# Patient Record
Sex: MALE | Race: WHITE | NOT HISPANIC OR LATINO | Employment: OTHER | URBAN - METROPOLITAN AREA
[De-identification: names, ages, dates, MRNs, and addresses within clinical notes are randomized per-mention and may not be internally consistent; named-entity substitution may affect disease eponyms.]

---

## 2017-02-07 ENCOUNTER — HOSPITAL ENCOUNTER (OUTPATIENT)
Dept: PET IMAGING | Facility: HOSPITAL | Age: 62
Discharge: HOME | End: 2017-02-07

## 2017-02-07 ENCOUNTER — OFFICE VISIT (OUTPATIENT)
Dept: ONCOLOGY | Facility: CLINIC | Age: 62
End: 2017-02-07

## 2017-02-07 ENCOUNTER — HOSPITAL ENCOUNTER (OUTPATIENT)
Dept: PET IMAGING | Facility: HOSPITAL | Age: 62
Discharge: HOME | End: 2017-02-07
Admitting: NURSE PRACTITIONER

## 2017-02-07 ENCOUNTER — LAB (OUTPATIENT)
Dept: LAB | Facility: HOSPITAL | Age: 62
End: 2017-02-07

## 2017-02-07 VITALS
HEIGHT: 66 IN | DIASTOLIC BLOOD PRESSURE: 66 MMHG | WEIGHT: 175 LBS | BODY MASS INDEX: 28.12 KG/M2 | SYSTOLIC BLOOD PRESSURE: 146 MMHG | RESPIRATION RATE: 18 BRPM | TEMPERATURE: 98.3 F | HEART RATE: 73 BPM

## 2017-02-07 DIAGNOSIS — C34.32 MALIGNANT NEOPLASM OF LOWER LOBE OF LEFT LUNG (HCC): ICD-10-CM

## 2017-02-07 DIAGNOSIS — C34.32 MALIGNANT NEOPLASM OF LOWER LOBE OF LEFT LUNG (HCC): Primary | ICD-10-CM

## 2017-02-07 DIAGNOSIS — D86.9 SARCOIDOSIS: ICD-10-CM

## 2017-02-07 LAB
ALBUMIN SERPL-MCNC: 4.2 G/DL (ref 3.2–4.8)
ALBUMIN/GLOB SERPL: 1.4 G/DL (ref 1.5–2.5)
ALP SERPL-CCNC: 73 U/L (ref 25–100)
ALT SERPL W P-5'-P-CCNC: 17 U/L (ref 7–40)
ANION GAP SERPL CALCULATED.3IONS-SCNC: 3 MMOL/L (ref 3–11)
AST SERPL-CCNC: 23 U/L (ref 0–33)
BASOPHILS # BLD AUTO: 0.04 10*3/MM3 (ref 0–0.2)
BASOPHILS NFR BLD AUTO: 0.7 % (ref 0–1)
BILIRUB SERPL-MCNC: 0.4 MG/DL (ref 0.3–1.2)
BUN BLD-MCNC: 11 MG/DL (ref 9–23)
BUN/CREAT SERPL: 12.2 (ref 7–25)
CALCIUM SPEC-SCNC: 9.4 MG/DL (ref 8.7–10.4)
CHLORIDE SERPL-SCNC: 103 MMOL/L (ref 99–109)
CO2 SERPL-SCNC: 32 MMOL/L (ref 20–31)
CREAT BLD-MCNC: 0.9 MG/DL (ref 0.6–1.3)
DEPRECATED RDW RBC AUTO: 45.5 FL (ref 37–54)
EOSINOPHIL # BLD AUTO: 0.25 10*3/MM3 (ref 0.1–0.3)
EOSINOPHIL NFR BLD AUTO: 4.4 % (ref 0–3)
ERYTHROCYTE [DISTWIDTH] IN BLOOD BY AUTOMATED COUNT: 14 % (ref 11.3–14.5)
GFR SERPL CREATININE-BSD FRML MDRD: 86 ML/MIN/1.73
GLOBULIN UR ELPH-MCNC: 2.9 GM/DL
GLUCOSE BLD-MCNC: 96 MG/DL (ref 70–100)
GLUCOSE BLDC GLUCOMTR-MCNC: 91 MG/DL (ref 70–130)
HCT VFR BLD AUTO: 44.6 % (ref 38.9–50.9)
HGB BLD-MCNC: 15.5 G/DL (ref 13.1–17.5)
IMM GRANULOCYTES # BLD: 0.01 10*3/MM3 (ref 0–0.03)
IMM GRANULOCYTES NFR BLD: 0.2 % (ref 0–0.6)
LYMPHOCYTES # BLD AUTO: 0.87 10*3/MM3 (ref 0.6–4.8)
LYMPHOCYTES NFR BLD AUTO: 15.3 % (ref 24–44)
MCH RBC QN AUTO: 31 PG (ref 27–31)
MCHC RBC AUTO-ENTMCNC: 34.8 G/DL (ref 32–36)
MCV RBC AUTO: 89.2 FL (ref 80–99)
MONOCYTES # BLD AUTO: 0.78 10*3/MM3 (ref 0–1)
MONOCYTES NFR BLD AUTO: 13.7 % (ref 0–12)
NEUTROPHILS # BLD AUTO: 3.74 10*3/MM3 (ref 1.5–8.3)
NEUTROPHILS NFR BLD AUTO: 65.7 % (ref 41–71)
PLATELET # BLD AUTO: 186 10*3/MM3 (ref 150–450)
PMV BLD AUTO: 10.5 FL (ref 6–12)
POTASSIUM BLD-SCNC: 4.2 MMOL/L (ref 3.5–5.5)
PROT SERPL-MCNC: 7.1 G/DL (ref 5.7–8.2)
RBC # BLD AUTO: 5 10*6/MM3 (ref 4.2–5.76)
SODIUM BLD-SCNC: 138 MMOL/L (ref 132–146)
WBC NRBC COR # BLD: 5.69 10*3/MM3 (ref 3.5–10.8)

## 2017-02-07 PROCEDURE — A9552 F18 FDG: HCPCS | Performed by: NURSE PRACTITIONER

## 2017-02-07 PROCEDURE — 36415 COLL VENOUS BLD VENIPUNCTURE: CPT

## 2017-02-07 PROCEDURE — 85025 COMPLETE CBC W/AUTO DIFF WBC: CPT | Performed by: NURSE PRACTITIONER

## 2017-02-07 PROCEDURE — 80053 COMPREHEN METABOLIC PANEL: CPT | Performed by: NURSE PRACTITIONER

## 2017-02-07 PROCEDURE — 78815 PET IMAGE W/CT SKULL-THIGH: CPT

## 2017-02-07 PROCEDURE — 99214 OFFICE O/P EST MOD 30 MIN: CPT | Performed by: INTERNAL MEDICINE

## 2017-02-07 PROCEDURE — 82962 GLUCOSE BLOOD TEST: CPT

## 2017-02-07 PROCEDURE — 0 FLUDEOXYGLUCOSE F18 SOLUTION: Performed by: NURSE PRACTITIONER

## 2017-02-07 RX ORDER — LEVOTHYROXINE SODIUM 0.07 MG/1
75 TABLET ORAL DAILY
COMMUNITY
Start: 2017-01-26

## 2017-02-07 RX ORDER — BUDESONIDE AND FORMOTEROL FUMARATE DIHYDRATE 160; 4.5 UG/1; UG/1
AEROSOL RESPIRATORY (INHALATION)
Refills: 3 | COMMUNITY
Start: 2017-02-03

## 2017-02-07 RX ADMIN — FLUDEOXYGLUCOSE F18 1 DOSE: 300 INJECTION INTRAVENOUS at 09:18

## 2017-02-07 NOTE — PROGRESS NOTES
DATE OF VISIT: 2/7/2017    REASON FOR VISIT: Stage Ia adenocarcinoma of the lung [id number].      HISTORY OF PRESENT ILLNESS: The patient is a very pleasant 61 y.o. male  with past medical history significant for lung cancer diagnosed 6/22/2016 . 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 PET scan done 6/22/2016 showed findings consistent with recurrent neoplasm in the left upper lobe, with metastatic adenopathy to the left hilum and AP window as well as a metastatic nodule in the left lower lobe. CT-guided biopsy of the left lower lobe nodule was performed that showed adenocarcinoma of the lung. The patient underwent CyberKnife therapy by Dr Pinzon completed 9/29/2016.  The patient is here today for scheduled follow up visit.     SUBJECTIVE: The patient has been doing fairly well. He continues to have complaints of wheezing, worse at night, with some chronic chest congestion. He has seen his primary care physician for this and has previously used steroid dose pack with improvement in symptoms. He uses a Pro-Air inhaler as needed with some improvement. He also notes persistent shortness of breath with activity. It has not worsened He denies hemoptysis. He has no new bone or joint discomfort. He continues to be active, and has gained some weight since his last visit.     PAST MEDICAL HISTORY/SOCIAL HISTORY/FAMILY HISTORY: Unchanged from my prior documentation done on 08/02/2016.     Review of Systems   Constitutional: Negative for activity change, appetite change, chills, fatigue, fever and unexpected weight change.   HENT: Negative for hearing loss, mouth sores, nosebleeds, sore throat and trouble swallowing.    Eyes: Negative for visual disturbance.   Respiratory: Positive for cough, shortness of breath and wheezing. Negative for chest tightness.    Cardiovascular: Negative for chest pain, palpitations and leg swelling.  "  Gastrointestinal: Negative for abdominal distention, abdominal pain, blood in stool, constipation, diarrhea, nausea, rectal pain and vomiting.   Endocrine: Negative for cold intolerance and heat intolerance.   Genitourinary: Negative for difficulty urinating, dysuria, frequency and urgency.   Musculoskeletal: Negative for arthralgias, back pain, gait problem, joint swelling and myalgias.   Skin: Negative for rash.   Neurological: Negative for dizziness, tremors, syncope, weakness, light-headedness, numbness and headaches.   Hematological: Negative for adenopathy. Does not bruise/bleed easily.   Psychiatric/Behavioral: Negative for confusion, sleep disturbance and suicidal ideas. The patient is not nervous/anxious.          Current Outpatient Prescriptions:   •  albuterol (PROVENTIL HFA;VENTOLIN HFA) 108 (90 BASE) MCG/ACT inhaler, Inhale 2 puffs every 4 (four) hours as needed for wheezing or shortness of air., Disp: , Rfl:   •  guaiFENesin (MUCINEX) 600 MG 12 hr tablet, Take 1 tablet by mouth 2 (Two) Times a Day., Disp: 60 tablet, Rfl: 5  •  HYDROcodone-acetaminophen (NORCO) 7.5-325 MG per tablet, Take 1 tablet by mouth every 6 (six) hours as needed for moderate pain (4-6)., Disp: , Rfl:   •  levothyroxine (SYNTHROID, LEVOTHROID) 50 MCG tablet, Take 50 mcg by mouth daily., Disp: , Rfl:   •  levothyroxine (SYNTHROID, LEVOTHROID) 75 MCG tablet, , Disp: , Rfl:   •  SYMBICORT 160-4.5 MCG/ACT inhaler, INL 2 PFS PO BID, Disp: , Rfl: 3  No current facility-administered medications for this visit.     PHYSICAL EXAMINATION:   Visit Vitals   • /66   • Pulse 73   • Temp 98.3 °F (36.8 °C)   • Resp 18   • Ht 66\" (167.6 cm)   • Wt 175 lb (79.4 kg)   • BMI 28.25 kg/m2      ECOG Performance Status: 1 - Symptomatic but completely ambulatory  General Appearance:  alert, cooperative, no apparent distress and appears stated age   Neurologic/Psychiatric: A&O x 3, gait steady, appropriate affect, strength 5/5 in all muscle groups "   HEENT:  Normocephalic, without obvious abnormality, mucous membranes moist   Neck: Supple, symmetrical, trachea midline, no adenopathy;  No thyromegaly, masses, or tenderness   Lungs:   Clear to auscultation bilaterally; respirations regular, even, and unlabored bilaterally   Heart:  Regular rate and rhythm, no murmurs appreciated   Abdomen:   Soft, non-tender, non-distended and no organomegaly   Lymph nodes: No cervical, supraclavicular, inguinal or axillary adenopathy noted   Extremities: Normal, atraumatic; no clubbing, cyanosis, or edema    Skin: No rashes, ulcers, or suspicious lesions noted     Lab on 02/07/2017   Component Date Value Ref Range Status   • Glucose 02/07/2017 96  70 - 100 mg/dL Final   • BUN 02/07/2017 11  9 - 23 mg/dL Final   • Creatinine 02/07/2017 0.90  0.60 - 1.30 mg/dL Final   • Sodium 02/07/2017 138  132 - 146 mmol/L Final   • Potassium 02/07/2017 4.2  3.5 - 5.5 mmol/L Final   • Chloride 02/07/2017 103  99 - 109 mmol/L Final   • CO2 02/07/2017 32.0* 20.0 - 31.0 mmol/L Final   • Calcium 02/07/2017 9.4  8.7 - 10.4 mg/dL Final   • Total Protein 02/07/2017 7.1  5.7 - 8.2 g/dL Final   • Albumin 02/07/2017 4.20  3.20 - 4.80 g/dL Final   • ALT (SGPT) 02/07/2017 17  7 - 40 U/L Final   • AST (SGOT) 02/07/2017 23  0 - 33 U/L Final   • Alkaline Phosphatase 02/07/2017 73  25 - 100 U/L Final   • Total Bilirubin 02/07/2017 0.4  0.3 - 1.2 mg/dL Final   • eGFR Non African Amer 02/07/2017 86  >60 mL/min/1.73 Final   • Globulin 02/07/2017 2.9  gm/dL Final   • A/G Ratio 02/07/2017 1.4* 1.5 - 2.5 g/dL Final   • BUN/Creatinine Ratio 02/07/2017 12.2  7.0 - 25.0 Final   • Anion Gap 02/07/2017 3.0  3.0 - 11.0 mmol/L Final   • WBC 02/07/2017 5.69  3.50 - 10.80 10*3/mm3 Final   • RBC 02/07/2017 5.00  4.20 - 5.76 10*6/mm3 Final   • Hemoglobin 02/07/2017 15.5  13.1 - 17.5 g/dL Final   • Hematocrit 02/07/2017 44.6  38.9 - 50.9 % Final   • MCV 02/07/2017 89.2  80.0 - 99.0 fL Final   • MCH 02/07/2017 31.0  27.0 - 31.0  pg Final   • MCHC 02/07/2017 34.8  32.0 - 36.0 g/dL Final   • RDW 02/07/2017 14.0  11.3 - 14.5 % Final   • RDW-SD 02/07/2017 45.5  37.0 - 54.0 fl Final   • MPV 02/07/2017 10.5  6.0 - 12.0 fL Final   • Platelets 02/07/2017 186  150 - 450 10*3/mm3 Final   • Neutrophil % 02/07/2017 65.7  41.0 - 71.0 % Final   • Lymphocyte % 02/07/2017 15.3* 24.0 - 44.0 % Final   • Monocyte % 02/07/2017 13.7* 0.0 - 12.0 % Final   • Eosinophil % 02/07/2017 4.4* 0.0 - 3.0 % Final   • Basophil % 02/07/2017 0.7  0.0 - 1.0 % Final   • Immature Grans % 02/07/2017 0.2  0.0 - 0.6 % Final   • Neutrophils, Absolute 02/07/2017 3.74  1.50 - 8.30 10*3/mm3 Final   • Lymphocytes, Absolute 02/07/2017 0.87  0.60 - 4.80 10*3/mm3 Final   • Monocytes, Absolute 02/07/2017 0.78  0.00 - 1.00 10*3/mm3 Final   • Eosinophils, Absolute 02/07/2017 0.25  0.10 - 0.30 10*3/mm3 Final   • Basophils, Absolute 02/07/2017 0.04  0.00 - 0.20 10*3/mm3 Final   • Immature Grans, Absolute 02/07/2017 0.01  0.00 - 0.03 10*3/mm3 Final   Hospital Outpatient Visit on 02/07/2017   Component Date Value Ref Range Status   • Glucose 02/07/2017 91  70 - 130 mg/dL Final        PET Scan from 11/082016 shows 1. Hypermetabolic mass in the left apex consistent with primary lung  neoplasm.  2. Enlarged and hypermetabolic lymph nodes in the aorticopulmonary  window and left hilum consistent with metastatic disease.  3. Hypermetabolic soft tissue nodule in the left lower lobe concerning  for metastatic disease.  4. Multiple non-FDG avid soft tissue nodules in the right lung.    ASSESSMENT: The patient is a very pleasant 61 y.o. male with metastatic adenocarcinoma of the lung    PROBLEM LIST:  1. Non-small cell lung cancer originally diagnosed in 2009 status post right upper lobectomy done by Dr. Real in 2009.  2. Left upper lobe lung nodules found on surveillance CT in 2012, status post left upper lobe wedge resection with benign pathology.  3. New left lower lobe lung nodule found on chest  x-ray done 6/2016 after presentation with cough, shortness of air, and wheezing unresponsive to oral antibiotics.   4. CAT scan of the chest done 6/16/2016 showing interval enlargement of left lung nodules. PET CT showing hypermetabolic activity in the left lower lobe nodule, no pet avid right lower lobe nodule, with mild activity in left mediastinal AP window and left hilar lymphadenopathy.   5. CT guided biopsy of left lower lobe nodule positive for adenocarcinoma of the lung. [id number], stage Ia.  6. Status post CyberKnife therapy completed 9/29/2016 per Dr. Lesia Pinzon  7. History of sarcoidosis  8. Cough    PLAN:  1. I reviewed the results of the recent PET scan with the patient.  I went over the pictures with the patient and his wife.  There is some activity noted on PET scan, however this is consistent with previous activity seen without evidence of progressive disease. I reviewed the films myself.  2. The patient will come back to see us in 4 months with repeat PET scan prior to return.  3. We will continue to monitor the patient's labs with CBC and CMP.   4.  The patient can continue Mucinex 600 mg take by mouth twice per day as needed for congestion and cough.   5. We will refer him to a pulmonologist in [place] for evaluation and management of his COPD and sarcoidosis.       Scribed for Padmaja Denny MD by MUKESH Nance. 2/7/2017  11:17 AM  Padmaja Denny MD  2/7/2017   I have reviewed the notes, assessments, and/or procedures performed by MUKESH Nance, I concur with her/his documentation of [facility]

## 2017-06-06 ENCOUNTER — HOSPITAL ENCOUNTER (OUTPATIENT)
Dept: PET IMAGING | Facility: HOSPITAL | Age: 62
Discharge: HOME | End: 2017-06-06
Admitting: NURSE PRACTITIONER

## 2017-06-06 ENCOUNTER — HOSPITAL ENCOUNTER (OUTPATIENT)
Dept: PET IMAGING | Facility: HOSPITAL | Age: 62
Discharge: HOME | End: 2017-06-06

## 2017-06-06 ENCOUNTER — OFFICE VISIT (OUTPATIENT)
Dept: ONCOLOGY | Facility: CLINIC | Age: 62
End: 2017-06-06

## 2017-06-06 VITALS
HEART RATE: 59 BPM | WEIGHT: 172 LBS | RESPIRATION RATE: 16 BRPM | SYSTOLIC BLOOD PRESSURE: 162 MMHG | HEIGHT: 66 IN | TEMPERATURE: 97.6 F | DIASTOLIC BLOOD PRESSURE: 68 MMHG | BODY MASS INDEX: 27.64 KG/M2

## 2017-06-06 DIAGNOSIS — C34.32 MALIGNANT NEOPLASM OF LOWER LOBE OF LEFT LUNG (HCC): ICD-10-CM

## 2017-06-06 DIAGNOSIS — C34.32 MALIGNANT NEOPLASM OF LOWER LOBE OF LEFT LUNG (HCC): Primary | ICD-10-CM

## 2017-06-06 LAB — GLUCOSE BLDC GLUCOMTR-MCNC: 97 MG/DL (ref 70–130)

## 2017-06-06 PROCEDURE — 82962 GLUCOSE BLOOD TEST: CPT

## 2017-06-06 PROCEDURE — A9552 F18 FDG: HCPCS | Performed by: NURSE PRACTITIONER

## 2017-06-06 PROCEDURE — 99214 OFFICE O/P EST MOD 30 MIN: CPT | Performed by: NURSE PRACTITIONER

## 2017-06-06 PROCEDURE — 0 FLUDEOXYGLUCOSE F18 SOLUTION: Performed by: NURSE PRACTITIONER

## 2017-06-06 PROCEDURE — 78815 PET IMAGE W/CT SKULL-THIGH: CPT

## 2017-06-06 RX ADMIN — FLUDEOXYGLUCOSE F18 1 DOSE: 300 INJECTION INTRAVENOUS at 09:23

## 2017-06-06 NOTE — PROGRESS NOTES
DATE OF VISIT: 6/6/2017    REASON FOR VISIT: Stage Ia adenocarcinoma of the lung [id number].      HISTORY OF PRESENT ILLNESS: The patient is a very pleasant 61 y.o. male  with past medical history significant for lung cancer diagnosed 6/22/2016 . 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 PET scan done 6/22/2016 showed findings consistent with recurrent neoplasm in the left upper lobe, with metastatic adenopathy to the left hilum and AP window as well as a metastatic nodule in the left lower lobe. CT-guided biopsy of the left lower lobe nodule was performed that showed adenocarcinoma of the lung. The patient underwent CyberKnife therapy by Dr Pinzon completed 9/29/2016.  The patient is here today for scheduled follow up visit.     SUBJECTIVE: The patient has been doing fairly well. His breathing has improved some since starting Symbicort for COPD. He is being seen by pulmonary in [place]. He has no progressive cough or shortness of breath. He denies hemoptysis. He is eating and drinking well and has had no unexplained weight loss. He has chronic arthritis pain, but no new bone or joint discomfort. Overall he feels well and is trying to remain active.     PAST MEDICAL HISTORY/SOCIAL HISTORY/FAMILY HISTORY: Unchanged from my prior documentation done on 08/02/2016.     Review of Systems   Constitutional: Negative for activity change, appetite change, chills, fatigue, fever and unexpected weight change.   HENT: Negative for hearing loss, mouth sores, nosebleeds, sore throat and trouble swallowing.    Eyes: Negative for visual disturbance.   Respiratory: Positive for cough, shortness of breath and wheezing. Negative for chest tightness.    Cardiovascular: Negative for chest pain, palpitations and leg swelling.   Gastrointestinal: Negative for abdominal distention, abdominal pain, blood in stool, constipation, diarrhea, nausea, rectal  pain and vomiting.   Endocrine: Negative for cold intolerance and heat intolerance.   Genitourinary: Negative for difficulty urinating, dysuria, frequency and urgency.   Musculoskeletal: Negative for arthralgias, back pain, gait problem, joint swelling and myalgias.   Skin: Negative for rash.   Neurological: Negative for dizziness, tremors, syncope, weakness, light-headedness, numbness and headaches.   Hematological: Negative for adenopathy. Does not bruise/bleed easily.   Psychiatric/Behavioral: Negative for confusion, sleep disturbance and suicidal ideas. The patient is not nervous/anxious.          Current Outpatient Prescriptions:   •  albuterol (PROVENTIL HFA;VENTOLIN HFA) 108 (90 BASE) MCG/ACT inhaler, Inhale 2 puffs every 4 (four) hours as needed for wheezing or shortness of air., Disp: , Rfl:   •  guaiFENesin (MUCINEX) 600 MG 12 hr tablet, Take 1 tablet by mouth 2 (Two) Times a Day., Disp: 60 tablet, Rfl: 5  •  HYDROcodone-acetaminophen (NORCO) 7.5-325 MG per tablet, Take 1 tablet by mouth every 6 (six) hours as needed for moderate pain (4-6)., Disp: , Rfl:   •  levothyroxine (SYNTHROID, LEVOTHROID) 50 MCG tablet, Take 50 mcg by mouth daily., Disp: , Rfl:   •  levothyroxine (SYNTHROID, LEVOTHROID) 75 MCG tablet, , Disp: , Rfl:   •  SYMBICORT 160-4.5 MCG/ACT inhaler, INL 2 PFS PO BID, Disp: , Rfl: 3  No current facility-administered medications for this visit.     PHYSICAL EXAMINATION:   There were no vitals taken for this visit.   ECOG Performance Status: 1 - Symptomatic but completely ambulatory  General Appearance:  alert, cooperative, no apparent distress and appears stated age   Neurologic/Psychiatric: A&O x 3, gait steady, appropriate affect, strength 5/5 in all muscle groups   HEENT:  Normocephalic, without obvious abnormality, mucous membranes moist   Neck: Supple, symmetrical, trachea midline, no adenopathy;  No thyromegaly, masses, or tenderness   Lungs:   Clear to auscultation bilaterally;  respirations regular, even, and unlabored bilaterally   Heart:  Regular rate and rhythm, no murmurs appreciated   Abdomen:   Soft, non-tender, non-distended and no organomegaly   Lymph nodes: No cervical, supraclavicular, inguinal or axillary adenopathy noted   Extremities: Normal, atraumatic; no clubbing, cyanosis, or edema    Skin: No rashes, ulcers, or suspicious lesions noted     Hospital Outpatient Visit on 06/06/2017   Component Date Value Ref Range Status   • Glucose 06/06/2017 97  70 - 130 mg/dL Final        PET Scan from 11/082016 shows 1. Hypermetabolic mass in the left apex consistent with primary lung  neoplasm.  2. Enlarged and hypermetabolic lymph nodes in the aorticopulmonary  window and left hilum consistent with metastatic disease.  3. Hypermetabolic soft tissue nodule in the left lower lobe concerning  for metastatic disease.  4. Multiple non-FDG avid soft tissue nodules in the right lung.    ASSESSMENT: The patient is a very pleasant 61 y.o. male with metastatic adenocarcinoma of the lung    PROBLEM LIST:  1. Non-small cell lung cancer originally diagnosed in 2009 status post right upper lobectomy done by Dr. Real in 2009.  2. Left upper lobe lung nodules found on surveillance CT in 2012, status post left upper lobe wedge resection with benign pathology.  3. New left lower lobe lung nodule found on chest x-ray done 6/2016 after presentation with cough, shortness of air, and wheezing unresponsive to oral antibiotics.   4. CAT scan of the chest done 6/16/2016 showing interval enlargement of left lung nodules. PET CT showing hypermetabolic activity in the left lower lobe nodule, no pet avid right lower lobe nodule, with mild activity in left mediastinal AP window and left hilar lymphadenopathy.   5. CT guided biopsy of left lower lobe nodule positive for adenocarcinoma of the lung. [id number], stage Ia.  6. Status post CyberKnife therapy completed 9/29/2016 per Dr. Lesia Pinzon  7. History of  sarcoidosis  8. Cough    PLAN:  1. I reviewed the results of the recent PET scan with the patient and his wife.  I reviewed the images myself, and with Dr. Denny. There continue to be two areas of hypermetabolic change without the left upper lobe nodule and posterior right midlung nodule. There has been slight increased activity in the areas of previously noted activity, but no new areas of disease progression. The patient is asymptomatic. We will order follow up PET upon return to evaluate for significant change.      2. The patient will come back to see us in 6 months with repeat PET scan prior to return.  3. We will continue to monitor the patient's labs with CBC and CMP.   4.  The patient can continue Mucinex 600 mg take by mouth twice per day as needed for congestion and cough as well as Symbicort inhaler twice daily.   5. The patient will continue follow up with pulmonologist in [place] for management of COPD and sarcoidosis.        Snehal Stallworth, APRN  6/6/2017

## 2017-12-05 ENCOUNTER — OFFICE VISIT (OUTPATIENT)
Dept: ONCOLOGY | Facility: CLINIC | Age: 62
End: 2017-12-05

## 2017-12-05 ENCOUNTER — HOSPITAL ENCOUNTER (OUTPATIENT)
Dept: PET IMAGING | Facility: HOSPITAL | Age: 62
Discharge: HOME | End: 2017-12-05
Admitting: NURSE PRACTITIONER

## 2017-12-05 ENCOUNTER — LAB (OUTPATIENT)
Dept: LAB | Facility: HOSPITAL | Age: 62
End: 2017-12-05

## 2017-12-05 ENCOUNTER — HOSPITAL ENCOUNTER (OUTPATIENT)
Dept: PET IMAGING | Facility: HOSPITAL | Age: 62
Discharge: HOME | End: 2017-12-05

## 2017-12-05 VITALS
HEIGHT: 66 IN | WEIGHT: 177.3 LBS | OXYGEN SATURATION: 97 % | DIASTOLIC BLOOD PRESSURE: 71 MMHG | TEMPERATURE: 97.3 F | BODY MASS INDEX: 28.49 KG/M2 | HEART RATE: 70 BPM | RESPIRATION RATE: 16 BRPM | SYSTOLIC BLOOD PRESSURE: 170 MMHG

## 2017-12-05 DIAGNOSIS — C34.32 MALIGNANT NEOPLASM OF LOWER LOBE OF LEFT LUNG (HCC): Primary | ICD-10-CM

## 2017-12-05 DIAGNOSIS — C34.32 MALIGNANT NEOPLASM OF LOWER LOBE OF LEFT LUNG (HCC): ICD-10-CM

## 2017-12-05 LAB
ALBUMIN SERPL-MCNC: 4.3 G/DL (ref 3.2–4.8)
ALBUMIN/GLOB SERPL: 1.5 G/DL (ref 1.5–2.5)
ALP SERPL-CCNC: 86 U/L (ref 25–100)
ALT SERPL W P-5'-P-CCNC: 31 U/L (ref 7–40)
ANION GAP SERPL CALCULATED.3IONS-SCNC: 7 MMOL/L (ref 3–11)
AST SERPL-CCNC: 36 U/L (ref 0–33)
BILIRUB SERPL-MCNC: 0.6 MG/DL (ref 0.3–1.2)
BUN BLD-MCNC: 10 MG/DL (ref 9–23)
BUN/CREAT SERPL: 10 (ref 7–25)
CALCIUM SPEC-SCNC: 9.2 MG/DL (ref 8.7–10.4)
CHLORIDE SERPL-SCNC: 105 MMOL/L (ref 99–109)
CO2 SERPL-SCNC: 30 MMOL/L (ref 20–31)
CREAT BLD-MCNC: 1 MG/DL (ref 0.6–1.3)
ERYTHROCYTE [DISTWIDTH] IN BLOOD BY AUTOMATED COUNT: 13.4 % (ref 11.3–14.5)
GFR SERPL CREATININE-BSD FRML MDRD: 75 ML/MIN/1.73
GLOBULIN UR ELPH-MCNC: 2.9 GM/DL
GLUCOSE BLD-MCNC: 86 MG/DL (ref 70–100)
GLUCOSE BLDC GLUCOMTR-MCNC: 91 MG/DL (ref 70–130)
HCT VFR BLD AUTO: 45.8 % (ref 38.9–50.9)
HGB BLD-MCNC: 15 G/DL (ref 13.1–17.5)
LYMPHOCYTES # BLD AUTO: 1.2 10*3/MM3 (ref 0.6–4.8)
LYMPHOCYTES NFR BLD AUTO: 22.5 % (ref 24–44)
MCH RBC QN AUTO: 30.4 PG (ref 27–31)
MCHC RBC AUTO-ENTMCNC: 32.7 G/DL (ref 32–36)
MCV RBC AUTO: 92.7 FL (ref 80–99)
MONOCYTES # BLD AUTO: 0.3 10*3/MM3 (ref 0–1)
MONOCYTES NFR BLD AUTO: 6.1 % (ref 0–12)
NEUTROPHILS # BLD AUTO: 3.8 10*3/MM3 (ref 1.5–8.3)
NEUTROPHILS NFR BLD AUTO: 71.4 % (ref 41–71)
PLATELET # BLD AUTO: 192 10*3/MM3 (ref 150–450)
PMV BLD AUTO: 7.5 FL (ref 6–12)
POTASSIUM BLD-SCNC: 3.9 MMOL/L (ref 3.5–5.5)
PROT SERPL-MCNC: 7.2 G/DL (ref 5.7–8.2)
RBC # BLD AUTO: 4.94 10*6/MM3 (ref 4.2–5.76)
SODIUM BLD-SCNC: 142 MMOL/L (ref 132–146)
WBC NRBC COR # BLD: 5.3 10*3/MM3 (ref 3.5–10.8)

## 2017-12-05 PROCEDURE — 0 FLUDEOXYGLUCOSE F18 SOLUTION: Performed by: NURSE PRACTITIONER

## 2017-12-05 PROCEDURE — 82962 GLUCOSE BLOOD TEST: CPT

## 2017-12-05 PROCEDURE — 99214 OFFICE O/P EST MOD 30 MIN: CPT | Performed by: NURSE PRACTITIONER

## 2017-12-05 PROCEDURE — 36415 COLL VENOUS BLD VENIPUNCTURE: CPT

## 2017-12-05 PROCEDURE — 80053 COMPREHEN METABOLIC PANEL: CPT

## 2017-12-05 PROCEDURE — 78816 PET IMAGE W/CT FULL BODY: CPT

## 2017-12-05 PROCEDURE — 85025 COMPLETE CBC W/AUTO DIFF WBC: CPT

## 2017-12-05 PROCEDURE — A9552 F18 FDG: HCPCS | Performed by: NURSE PRACTITIONER

## 2017-12-05 RX ADMIN — FLUDEOXYGLUCOSE F18 1 DOSE: 300 INJECTION INTRAVENOUS at 08:49

## 2017-12-05 NOTE — PROGRESS NOTES
DATE OF VISIT: 12/5/2017    REASON FOR VISIT: Stage Ia adenocarcinoma of the lung [id number].      HISTORY OF PRESENT ILLNESS: The patient is a very pleasant 62 y.o. male  with past medical history significant for lung cancer diagnosed 6/22/2016 . 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 PET scan done 6/22/2016 showed findings consistent with recurrent neoplasm in the left upper lobe, with metastatic adenopathy to the left hilum and AP window as well as a metastatic nodule in the left lower lobe. CT-guided biopsy of the left lower lobe nodule was performed that showed adenocarcinoma of the lung. The patient underwent CyberKnife therapy by Dr Pinzon completed 9/29/2016.  The patient is here today for scheduled follow up visit.     SUBJECTIVE: The patient has been doing fairly well. He has had no changes in health history of recent illnesses. He continues to have some mild shortness of breath and cough, unchanged from previous visits. He has no new pain or discomfort. He denies headaches or blurry vision. His weight has been stable.     PAST MEDICAL HISTORY/SOCIAL HISTORY/FAMILY HISTORY: Unchanged from my prior documentation done on 08/02/2016.     Review of Systems   Constitutional: Negative for activity change, appetite change, chills, fatigue, fever and unexpected weight change.   HENT: Negative for hearing loss, mouth sores, nosebleeds, sore throat and trouble swallowing.    Eyes: Negative for visual disturbance.   Respiratory: Positive for cough, shortness of breath and wheezing. Negative for chest tightness.    Cardiovascular: Negative for chest pain, palpitations and leg swelling.   Gastrointestinal: Negative for abdominal distention, abdominal pain, blood in stool, constipation, diarrhea, nausea, rectal pain and vomiting.   Endocrine: Negative for cold intolerance and heat intolerance.   Genitourinary: Negative for difficulty  urinating, dysuria, frequency and urgency.   Musculoskeletal: Positive for arthralgias. Negative for back pain, gait problem, joint swelling and myalgias.   Skin: Negative for rash.   Neurological: Negative for dizziness, tremors, syncope, weakness, light-headedness, numbness and headaches.   Hematological: Negative for adenopathy. Does not bruise/bleed easily.   Psychiatric/Behavioral: Negative for confusion, sleep disturbance and suicidal ideas. The patient is not nervous/anxious.          Current Outpatient Prescriptions:   •  albuterol (PROVENTIL HFA;VENTOLIN HFA) 108 (90 BASE) MCG/ACT inhaler, Inhale 2 puffs every 4 (four) hours as needed for wheezing or shortness of air., Disp: , Rfl:   •  guaiFENesin (MUCINEX) 600 MG 12 hr tablet, Take 1 tablet by mouth 2 (Two) Times a Day., Disp: 60 tablet, Rfl: 5  •  HYDROcodone-acetaminophen (NORCO) 7.5-325 MG per tablet, Take 1 tablet by mouth every 6 (six) hours as needed for moderate pain (4-6)., Disp: , Rfl:   •  levothyroxine (SYNTHROID, LEVOTHROID) 50 MCG tablet, Take 50 mcg by mouth daily., Disp: , Rfl:   •  levothyroxine (SYNTHROID, LEVOTHROID) 75 MCG tablet, , Disp: , Rfl:   •  SYMBICORT 160-4.5 MCG/ACT inhaler, INL 2 PFS PO BID, Disp: , Rfl: 3    PHYSICAL EXAMINATION:   There were no vitals taken for this visit.   ECOG Performance Status: 1 - Symptomatic but completely ambulatory  General Appearance:  alert, cooperative, no apparent distress and appears stated age   Neurologic/Psychiatric: A&O x 3, gait steady, appropriate affect, strength 5/5 in all muscle groups   HEENT:  Normocephalic, without obvious abnormality, mucous membranes moist   Neck: Supple, symmetrical, trachea midline, no adenopathy;  No thyromegaly, masses, or tenderness   Lungs:   Clear to auscultation bilaterally; respirations regular, even, and unlabored bilaterally   Heart:  Regular rate and rhythm, no murmurs appreciated   Abdomen:   Soft, non-tender, non-distended and no organomegaly    Lymph nodes: No cervical, supraclavicular, inguinal or axillary adenopathy noted   Extremities: Normal, atraumatic; no clubbing, cyanosis, or edema    Skin: No rashes, ulcers, or suspicious lesions noted     No visits with results within 2 Week(s) from this visit.  Latest known visit with results is:    Hospital Outpatient Visit on 06/06/2017   Component Date Value Ref Range Status   • Glucose 06/06/2017 97  70 - 130 mg/dL Final        PET Scan from 11/082016 shows 1. Hypermetabolic mass in the left apex consistent with primary lung  neoplasm.  2. Enlarged and hypermetabolic lymph nodes in the aorticopulmonary  window and left hilum consistent with metastatic disease.  3. Hypermetabolic soft tissue nodule in the left lower lobe concerning  for metastatic disease.  4. Multiple non-FDG avid soft tissue nodules in the right lung.    ASSESSMENT: The patient is a very pleasant 61 y.o. male with metastatic adenocarcinoma of the lung    PROBLEM LIST:  1. Non-small cell lung cancer originally diagnosed in 2009 status post right upper lobectomy done by Dr. Real in 2009.  2. Left upper lobe lung nodules found on surveillance CT in 2012, status post left upper lobe wedge resection with benign pathology.  3. New left lower lobe lung nodule found on chest x-ray done 6/2016 after presentation with cough, shortness of air, and wheezing unresponsive to oral antibiotics.   4. CAT scan of the chest done 6/16/2016 showing interval enlargement of left lung nodules. PET CT showing hypermetabolic activity in the left lower lobe nodule, no pet avid right lower lobe nodule, with mild activity in left mediastinal AP window and left hilar lymphadenopathy.   5. CT guided biopsy of left lower lobe nodule positive for adenocarcinoma of the lung. [id number], stage Ia.  6. Status post CyberKnife therapy completed 9/29/2016 per Dr. Lesia Pinzon  7. History of sarcoidosis  8. Cough    PLAN:  1. I reviewed the results of the recent PET scan with the  patient and his wife.  I reviewed the images myself, and with Dr. Denny. There continue to be two areas of hypermetabolic change without the left upper lobe nodule and posterior right midlung nodule, but they are stable from previous scans. The patient remains asymptomatic.    2. The patient will come back to see us in 6 months with repeat PET scan prior to return.  3. We will continue to monitor the patient's labs with CBC and CMP.   4.  The patient can continue Mucinex 600 mg take by mouth twice per day as needed for congestion and cough as well as Symbicort inhaler twice daily.   5. The patient will continue follow up with pulmonologist in [place] for management of COPD and sarcoidosis.      Snehal Stallworth, APRN  12/5/2017

## 2018-06-05 ENCOUNTER — OFFICE VISIT (OUTPATIENT)
Dept: ONCOLOGY | Facility: CLINIC | Age: 63
End: 2018-06-05

## 2018-06-05 ENCOUNTER — HOSPITAL ENCOUNTER (OUTPATIENT)
Dept: PET IMAGING | Facility: HOSPITAL | Age: 63
Discharge: HOME | End: 2018-06-05
Attending: INTERNAL MEDICINE

## 2018-06-05 ENCOUNTER — HOSPITAL ENCOUNTER (OUTPATIENT)
Dept: PET IMAGING | Facility: HOSPITAL | Age: 63
Discharge: HOME | End: 2018-06-05
Attending: INTERNAL MEDICINE | Admitting: INTERNAL MEDICINE

## 2018-06-05 ENCOUNTER — APPOINTMENT (OUTPATIENT)
Dept: LAB | Facility: HOSPITAL | Age: 63
End: 2018-06-05

## 2018-06-05 VITALS
TEMPERATURE: 98 F | HEIGHT: 66 IN | WEIGHT: 176 LBS | BODY MASS INDEX: 28.28 KG/M2 | DIASTOLIC BLOOD PRESSURE: 75 MMHG | SYSTOLIC BLOOD PRESSURE: 166 MMHG | HEART RATE: 61 BPM | RESPIRATION RATE: 18 BRPM

## 2018-06-05 DIAGNOSIS — C34.32 MALIGNANT NEOPLASM OF LOWER LOBE OF LEFT LUNG (HCC): ICD-10-CM

## 2018-06-05 DIAGNOSIS — C34.32 MALIGNANT NEOPLASM OF LOWER LOBE OF LEFT LUNG (HCC): Primary | ICD-10-CM

## 2018-06-05 LAB — GLUCOSE BLDC GLUCOMTR-MCNC: 82 MG/DL (ref 70–130)

## 2018-06-05 PROCEDURE — 78816 PET IMAGE W/CT FULL BODY: CPT

## 2018-06-05 PROCEDURE — 82962 GLUCOSE BLOOD TEST: CPT

## 2018-06-05 PROCEDURE — 0 FLUDEOXYGLUCOSE F18 SOLUTION: Performed by: INTERNAL MEDICINE

## 2018-06-05 PROCEDURE — 99214 OFFICE O/P EST MOD 30 MIN: CPT | Performed by: INTERNAL MEDICINE

## 2018-06-05 PROCEDURE — A9552 F18 FDG: HCPCS | Performed by: INTERNAL MEDICINE

## 2018-06-05 RX ORDER — OMEPRAZOLE 40 MG/1
40 CAPSULE, DELAYED RELEASE ORAL DAILY
COMMUNITY
Start: 2018-05-11

## 2018-06-05 RX ORDER — FLUTICASONE PROPIONATE 50 MCG
2 SPRAY, SUSPENSION (ML) NASAL DAILY
Status: ON HOLD | COMMUNITY
Start: 2018-05-14 | End: 2019-11-06

## 2018-06-05 RX ADMIN — FLUDEOXYGLUCOSE F18 1 DOSE: 300 INJECTION INTRAVENOUS at 08:37

## 2018-06-05 NOTE — PROGRESS NOTES
DATE OF VISIT: 6/5/2018    REASON FOR VISIT: Stage Ia adenocarcinoma of the lung [id number].      HISTORY OF PRESENT ILLNESS: The patient is a very pleasant 62 y.o. male  with past medical history significant for lung cancer diagnosed 6/22/2016 . 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 PET scan done 6/22/2016 showed findings consistent with recurrent neoplasm in the left upper lobe, with metastatic adenopathy to the left hilum and AP window as well as a metastatic nodule in the left lower lobe. CT-guided biopsy of the left lower lobe nodule was performed that showed adenocarcinoma of the lung. The patient underwent CyberKnife therapy by Dr Pinzon completed 9/29/2016.  The patient is here today for scheduled follow up visit.     SUBJECTIVE: The patient has been doing fairly well. He continues to have complaints of wheezing, worse at night, with some chronic chest congestion. He has seen his primary care physician for this and has previously used steroid dose pack with improvement in symptoms. He uses a Pro-Air inhaler as needed with some improvement. He also notes persistent shortness of breath with activity. It has not worsened He denies hemoptysis. He has no new bone or joint discomfort. He continues to be active, and has gained some weight since his last visit.     PAST MEDICAL HISTORY/SOCIAL HISTORY/FAMILY HISTORY: Unchanged from my prior documentation done on 08/02/2016.     Review of Systems   Constitutional: Negative for activity change, appetite change, chills, fatigue, fever and unexpected weight change.   HENT: Negative for hearing loss, mouth sores, nosebleeds, sore throat and trouble swallowing.    Eyes: Negative for visual disturbance.   Respiratory: Positive for cough, shortness of breath and wheezing. Negative for chest tightness.    Cardiovascular: Negative for chest pain, palpitations and leg swelling.  "  Gastrointestinal: Negative for abdominal distention, abdominal pain, blood in stool, constipation, diarrhea, nausea, rectal pain and vomiting.   Endocrine: Negative for cold intolerance and heat intolerance.   Genitourinary: Negative for difficulty urinating, dysuria, frequency and urgency.   Musculoskeletal: Negative for arthralgias, back pain, gait problem, joint swelling and myalgias.   Skin: Negative for rash.   Neurological: Negative for dizziness, tremors, syncope, weakness, light-headedness, numbness and headaches.   Hematological: Negative for adenopathy. Does not bruise/bleed easily.   Psychiatric/Behavioral: Negative for confusion, sleep disturbance and suicidal ideas. The patient is not nervous/anxious.          Current Outpatient Prescriptions:   •  albuterol (PROVENTIL HFA;VENTOLIN HFA) 108 (90 BASE) MCG/ACT inhaler, Inhale 2 puffs every 4 (four) hours as needed for wheezing or shortness of air., Disp: , Rfl:   •  fluticasone (FLONASE) 50 MCG/ACT nasal spray, , Disp: , Rfl:   •  guaiFENesin (MUCINEX) 600 MG 12 hr tablet, Take 1 tablet by mouth 2 (Two) Times a Day., Disp: 60 tablet, Rfl: 5  •  HYDROcodone-acetaminophen (NORCO) 7.5-325 MG per tablet, Take 1 tablet by mouth every 6 (six) hours as needed for moderate pain (4-6)., Disp: , Rfl:   •  levothyroxine (SYNTHROID, LEVOTHROID) 50 MCG tablet, Take 50 mcg by mouth daily., Disp: , Rfl:   •  levothyroxine (SYNTHROID, LEVOTHROID) 75 MCG tablet, , Disp: , Rfl:   •  omeprazole (priLOSEC) 40 MG capsule, Take 40 mg by mouth Daily., Disp: , Rfl:   •  SYMBICORT 160-4.5 MCG/ACT inhaler, INL 2 PFS PO BID, Disp: , Rfl: 3  No current facility-administered medications for this visit.     PHYSICAL EXAMINATION:   /75   Pulse 61   Temp 98 °F (36.7 °C)   Resp 18   Ht 167.6 cm (66\")   Wt 79.8 kg (176 lb)   BMI 28.41 kg/m²    ECOG Performance Status: 1 - Symptomatic but completely ambulatory  General Appearance:  alert, cooperative, no apparent distress and " appears stated age   Neurologic/Psychiatric: A&O x 3, gait steady, appropriate affect, strength 5/5 in all muscle groups   HEENT:  Normocephalic, without obvious abnormality, mucous membranes moist   Neck: Supple, symmetrical, trachea midline, no adenopathy;  No thyromegaly, masses, or tenderness   Lungs:   Clear to auscultation bilaterally; respirations regular, even, and unlabored bilaterally   Heart:  Regular rate and rhythm, no murmurs appreciated   Abdomen:   Soft, non-tender, non-distended and no organomegaly   Lymph nodes: No cervical, supraclavicular, inguinal or axillary adenopathy noted   Extremities: Normal, atraumatic; no clubbing, cyanosis, or edema    Skin: No rashes, ulcers, or suspicious lesions noted     Hospital Outpatient Visit on 06/05/2018   Component Date Value Ref Range Status   • Glucose 06/05/2018 82  70 - 130 mg/dL Final        PET Scan from 11/082016 shows 1. Hypermetabolic mass in the left apex consistent with primary lung  neoplasm.  2. Enlarged and hypermetabolic lymph nodes in the aorticopulmonary  window and left hilum consistent with metastatic disease.  3. Hypermetabolic soft tissue nodule in the left lower lobe concerning  for metastatic disease.  4. Multiple non-FDG avid soft tissue nodules in the right lung.    ASSESSMENT: The patient is a very pleasant 61 y.o. male with metastatic adenocarcinoma of the lung    PROBLEM LIST:  1. Non-small cell lung cancer originally diagnosed in 2009 status post right upper lobectomy done by Dr. Real in 2009.  2. Left upper lobe lung nodules found on surveillance CT in 2012, status post left upper lobe wedge resection with benign pathology.  3. New left lower lobe lung nodule found on chest x-ray done 6/2016 after presentation with cough, shortness of air, and wheezing unresponsive to oral antibiotics.   4. CAT scan of the chest done 6/16/2016 showing interval enlargement of left lung nodules. PET CT showing hypermetabolic activity in the left  lower lobe nodule, no pet avid right lower lobe nodule, with mild activity in left mediastinal AP window and left hilar lymphadenopathy.   5. CT guided biopsy of left lower lobe nodule positive for adenocarcinoma of the lung. [id number], stage Ia.  6. Status post CyberKnife therapy completed 9/29/2016 per Dr. Lesia Pinzon  7. Sarcoidosis    PLAN:  1. I reviewed the results of the PET scan with the patient and his wife.  I reviewed the films myself.  It has not been officially read however to me looks essentially stable.  I would follow-up on the radiologist's report.  2. The patient will come back to see us in 6 months with repeat PET scan prior to return.  3. We will continue to monitor the patient's labs with CBC and CMP.   4.  The patient can continue Mucinex 600 mg take by mouth twice per day as needed for congestion and cough.   5.  The patient will continue his inhalers for shortness of breath.  He is being managed by pulmonary in [place] for his COPD and sarcoidosis.     Padmaja Denny MD  6/5/2018   11:25 AM

## 2018-09-10 ENCOUNTER — APPOINTMENT (OUTPATIENT)
Dept: PET IMAGING | Facility: HOSPITAL | Age: 63
End: 2018-09-10
Attending: INTERNAL MEDICINE

## 2018-12-10 ENCOUNTER — HOSPITAL ENCOUNTER (OUTPATIENT)
Dept: PET IMAGING | Facility: HOSPITAL | Age: 63
Discharge: HOME | End: 2018-12-10
Attending: INTERNAL MEDICINE

## 2018-12-10 ENCOUNTER — HOSPITAL ENCOUNTER (OUTPATIENT)
Dept: PET IMAGING | Facility: HOSPITAL | Age: 63
Discharge: HOME | End: 2018-12-10
Attending: INTERNAL MEDICINE | Admitting: INTERNAL MEDICINE

## 2018-12-10 ENCOUNTER — OFFICE VISIT (OUTPATIENT)
Dept: ONCOLOGY | Facility: CLINIC | Age: 63
End: 2018-12-10

## 2018-12-10 VITALS
BODY MASS INDEX: 28.77 KG/M2 | TEMPERATURE: 97.8 F | SYSTOLIC BLOOD PRESSURE: 195 MMHG | WEIGHT: 179 LBS | HEIGHT: 66 IN | HEART RATE: 67 BPM | RESPIRATION RATE: 14 BRPM | DIASTOLIC BLOOD PRESSURE: 83 MMHG

## 2018-12-10 DIAGNOSIS — C34.32 MALIGNANT NEOPLASM OF LOWER LOBE OF LEFT LUNG (HCC): Primary | ICD-10-CM

## 2018-12-10 DIAGNOSIS — C34.11 MALIGNANT NEOPLASM OF UPPER LOBE OF RIGHT LUNG (HCC): ICD-10-CM

## 2018-12-10 DIAGNOSIS — C34.32 MALIGNANT NEOPLASM OF LOWER LOBE OF LEFT LUNG (HCC): ICD-10-CM

## 2018-12-10 LAB — GLUCOSE BLDC GLUCOMTR-MCNC: 107 MG/DL (ref 70–130)

## 2018-12-10 PROCEDURE — 99214 OFFICE O/P EST MOD 30 MIN: CPT | Performed by: NURSE PRACTITIONER

## 2018-12-10 PROCEDURE — 82962 GLUCOSE BLOOD TEST: CPT

## 2018-12-10 PROCEDURE — 78816 PET IMAGE W/CT FULL BODY: CPT

## 2018-12-10 PROCEDURE — A9552 F18 FDG: HCPCS | Performed by: INTERNAL MEDICINE

## 2018-12-10 PROCEDURE — 0 FLUDEOXYGLUCOSE F18 SOLUTION: Performed by: INTERNAL MEDICINE

## 2018-12-10 RX ADMIN — FLUDEOXYGLUCOSE F18 1 DOSE: 300 INJECTION INTRAVENOUS at 08:51

## 2018-12-10 NOTE — PROGRESS NOTES
DATE OF VISIT: 12/10/2018    REASON FOR VISIT: Stage Ia adenocarcinoma of the lung [id number].      HISTORY OF PRESENT ILLNESS: The patient is a very pleasant 63 y.o. male  with past medical history significant for lung cancer diagnosed 6/22/2016 . 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 PET scan done 6/22/2016 showed findings consistent with recurrent neoplasm in the left upper lobe, with metastatic adenopathy to the left hilum and AP window as well as a metastatic nodule in the left lower lobe. CT-guided biopsy of the left lower lobe nodule was performed that showed adenocarcinoma of the lung. The patient underwent CyberKnife therapy by Dr Pinzon completed 9/29/2016.  The patient is here today for scheduled follow up visit.     SUBJECTIVE: The patient is here today with his wife. He has has been feeling well with no significant changes in health history since his last visit. His breathing is stable. He has some exertional shortness of air, but this has not been progressive. He denies hemoptysis. He has had no weight loss or change in appetite. He denies new bone or joint pain. He has had no headaches or visual changes. He is staying active.     PAST MEDICAL HISTORY/SOCIAL HISTORY/FAMILY HISTORY: Unchanged from my prior documentation done on 08/02/2016.     Review of Systems   Constitutional: Negative for activity change, appetite change, chills, fatigue, fever and unexpected weight change.   HENT: Negative for hearing loss, mouth sores, nosebleeds, sore throat and trouble swallowing.    Eyes: Negative for visual disturbance.   Respiratory: Positive for shortness of breath and wheezing. Negative for cough and chest tightness.         With activity   Cardiovascular: Negative for chest pain, palpitations and leg swelling.   Gastrointestinal: Negative for abdominal distention, abdominal pain, blood in stool, constipation, diarrhea,  "nausea, rectal pain and vomiting.   Endocrine: Negative for cold intolerance and heat intolerance.   Genitourinary: Negative for difficulty urinating, dysuria, frequency and urgency.   Musculoskeletal: Negative for arthralgias, back pain, gait problem, joint swelling and myalgias.   Skin: Negative for rash.   Neurological: Negative for dizziness, tremors, syncope, weakness, light-headedness, numbness and headaches.   Hematological: Negative for adenopathy. Does not bruise/bleed easily.   Psychiatric/Behavioral: Negative for confusion, sleep disturbance and suicidal ideas. The patient is not nervous/anxious.          Current Outpatient Medications:   •  albuterol (PROVENTIL HFA;VENTOLIN HFA) 108 (90 BASE) MCG/ACT inhaler, Inhale 2 puffs every 4 (four) hours as needed for wheezing or shortness of air., Disp: , Rfl:   •  fluticasone (FLONASE) 50 MCG/ACT nasal spray, , Disp: , Rfl:   •  guaiFENesin (MUCINEX) 600 MG 12 hr tablet, Take 1 tablet by mouth 2 (Two) Times a Day., Disp: 60 tablet, Rfl: 5  •  HYDROcodone-acetaminophen (NORCO) 7.5-325 MG per tablet, Take 1 tablet by mouth every 6 (six) hours as needed for moderate pain (4-6)., Disp: , Rfl:   •  levothyroxine (SYNTHROID, LEVOTHROID) 50 MCG tablet, Take 50 mcg by mouth daily., Disp: , Rfl:   •  levothyroxine (SYNTHROID, LEVOTHROID) 75 MCG tablet, , Disp: , Rfl:   •  omeprazole (priLOSEC) 40 MG capsule, Take 40 mg by mouth Daily., Disp: , Rfl:   •  SYMBICORT 160-4.5 MCG/ACT inhaler, INL 2 PFS PO BID, Disp: , Rfl: 3  No current facility-administered medications for this visit.     PHYSICAL EXAMINATION:   BP (!) 195/83   Pulse 67   Temp 97.8 °F (36.6 °C) (Temporal)   Resp 14   Ht 167.6 cm (66\")   Wt 81.2 kg (179 lb)   BMI 28.89 kg/m²    ECOG Performance Status: 1 - Symptomatic but completely ambulatory  General Appearance:  alert, cooperative, no apparent distress and appears stated age   Neurologic/Psychiatric: A&O x 3, gait steady, appropriate affect, strength " 5/5 in all muscle groups   HEENT:  Normocephalic, without obvious abnormality, mucous membranes moist   Neck: Supple, symmetrical, trachea midline, no adenopathy;  No thyromegaly, masses, or tenderness   Lungs:   Clear to auscultation bilaterally; respirations regular, even, and unlabored bilaterally   Heart:  Regular rate and rhythm, no murmurs appreciated   Abdomen:   Soft, non-tender, non-distended and no organomegaly   Lymph nodes: No cervical, supraclavicular, inguinal or axillary adenopathy noted   Extremities: Normal, atraumatic; no clubbing, cyanosis, or edema    Skin: No rashes, ulcers, or suspicious lesions noted     Hospital Outpatient Visit on 12/10/2018   Component Date Value Ref Range Status   • Glucose 12/10/2018 107  70 - 130 mg/dL Final        PET Scan from 11/082016 shows 1. Hypermetabolic mass in the left apex consistent with primary lung  neoplasm.  2. Enlarged and hypermetabolic lymph nodes in the aorticopulmonary  window and left hilum consistent with metastatic disease.  3. Hypermetabolic soft tissue nodule in the left lower lobe concerning  for metastatic disease.  4. Multiple non-FDG avid soft tissue nodules in the right lung.    ASSESSMENT: The patient is a very pleasant 61 y.o. male with metastatic adenocarcinoma of the lung    PROBLEM LIST:  1. Non-small cell lung cancer originally diagnosed in 2009 status post right upper lobectomy done by Dr. Real in 2009.  2. Left upper lobe lung nodules found on surveillance CT in 2012, status post left upper lobe wedge resection with benign pathology.  3. New left lower lobe lung nodule found on chest x-ray done 6/2016 after presentation with cough, shortness of air, and wheezing unresponsive to oral antibiotics.   4. CAT scan of the chest done 6/16/2016 showing interval enlargement of left lung nodules. PET CT showing hypermetabolic activity in the left lower lobe nodule, no pet avid right lower lobe nodule, with mild activity in left mediastinal  AP window and left hilar lymphadenopathy.   5. CT guided biopsy of left lower lobe nodule positive for adenocarcinoma of the lung. [id number], stage Ia.  6. Status post CyberKnife therapy completed 9/29/2016 per Dr. Lesia Pinzon  7. Sarcoidosis  8. Hypothyroidism    PLAN:  1. I reviewed the results of the PET scan with the patient and his wife.  I reviewed the films myself. I told the patient he has stable findings with no evidence of new or progressive disease.   2. The patient will come back to see us in 6 months with repeat PET scan prior to return.   3. We will continue to monitor the patient's labs with CBC and CMP on return.   4.  The patient can continue Mucinex 600 mg take by mouth twice per day as needed for congestion and cough.   5.  He will continue follow up with pulmonary in [place] for management of sarcoidosis with use of albuterol and Symbicort for COPD and shortness of breath.     6. He will continue levothyroxine for hypothyroidism.     Snehal Stallworth, APRN  12/10/2018

## 2019-06-10 ENCOUNTER — HOSPITAL ENCOUNTER (OUTPATIENT)
Dept: PET IMAGING | Facility: HOSPITAL | Age: 64
Discharge: HOME | End: 2019-06-10

## 2019-06-10 ENCOUNTER — HOSPITAL ENCOUNTER (OUTPATIENT)
Dept: PET IMAGING | Facility: HOSPITAL | Age: 64
Discharge: HOME | End: 2019-06-10
Admitting: NURSE PRACTITIONER

## 2019-06-10 ENCOUNTER — OFFICE VISIT (OUTPATIENT)
Dept: ONCOLOGY | Facility: CLINIC | Age: 64
End: 2019-06-10

## 2019-06-10 VITALS
TEMPERATURE: 97.5 F | SYSTOLIC BLOOD PRESSURE: 140 MMHG | DIASTOLIC BLOOD PRESSURE: 75 MMHG | RESPIRATION RATE: 16 BRPM | BODY MASS INDEX: 27.8 KG/M2 | WEIGHT: 173 LBS | HEIGHT: 66 IN | HEART RATE: 65 BPM | OXYGEN SATURATION: 95 %

## 2019-06-10 DIAGNOSIS — C34.32 MALIGNANT NEOPLASM OF LOWER LOBE OF LEFT LUNG (HCC): ICD-10-CM

## 2019-06-10 DIAGNOSIS — C34.32 MALIGNANT NEOPLASM OF LOWER LOBE OF LEFT LUNG (HCC): Primary | ICD-10-CM

## 2019-06-10 DIAGNOSIS — C34.11 MALIGNANT NEOPLASM OF UPPER LOBE OF RIGHT LUNG (HCC): ICD-10-CM

## 2019-06-10 LAB — GLUCOSE BLDC GLUCOMTR-MCNC: 77 MG/DL (ref 70–130)

## 2019-06-10 PROCEDURE — 99214 OFFICE O/P EST MOD 30 MIN: CPT | Performed by: INTERNAL MEDICINE

## 2019-06-10 PROCEDURE — 78815 PET IMAGE W/CT SKULL-THIGH: CPT

## 2019-06-10 PROCEDURE — A9552 F18 FDG: HCPCS | Performed by: NURSE PRACTITIONER

## 2019-06-10 PROCEDURE — 82962 GLUCOSE BLOOD TEST: CPT

## 2019-06-10 PROCEDURE — 0 FLUDEOXYGLUCOSE F18 SOLUTION: Performed by: NURSE PRACTITIONER

## 2019-06-10 RX ORDER — LISINOPRIL 5 MG/1
5 TABLET ORAL DAILY
COMMUNITY
End: 2019-10-25

## 2019-06-10 RX ADMIN — FLUDEOXYGLUCOSE F18 1 DOSE: 300 INJECTION INTRAVENOUS at 13:37

## 2019-06-10 NOTE — PROGRESS NOTES
DATE OF VISIT: 6/10/2019    REASON FOR VISIT: Stage Ia adenocarcinoma of the lung [id number].      HISTORY OF PRESENT ILLNESS: The patient is a very pleasant 63 y.o. male  with past medical history significant for lung cancer diagnosed 6/22/2016 . 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 PET scan done 6/22/2016 showed findings consistent with recurrent neoplasm in the left upper lobe, with metastatic adenopathy to the left hilum and AP window as well as a metastatic nodule in the left lower lobe. CT-guided biopsy of the left lower lobe nodule was performed that showed adenocarcinoma of the lung. The patient underwent CyberKnife therapy by Dr Pinzon completed 9/29/2016.  The patient is here today for scheduled follow up visit.     SUBJECTIVE: The patient is here today with his wife.  All in all he has been doing fairly well.  His breathing is stable.  He is anxious about the scan results.    PAST MEDICAL HISTORY/SOCIAL HISTORY/FAMILY HISTORY: Unchanged from my prior documentation done on 08/02/2016.     Review of Systems   Constitutional: Negative for activity change, appetite change, chills, fatigue, fever and unexpected weight change.   HENT: Negative for hearing loss, mouth sores, nosebleeds, sore throat and trouble swallowing.    Eyes: Negative for visual disturbance.   Respiratory: Positive for shortness of breath and wheezing. Negative for cough and chest tightness.         With activity   Cardiovascular: Negative for chest pain, palpitations and leg swelling.   Gastrointestinal: Negative for abdominal distention, abdominal pain, blood in stool, constipation, diarrhea, nausea, rectal pain and vomiting.   Endocrine: Negative for cold intolerance and heat intolerance.   Genitourinary: Negative for difficulty urinating, dysuria, frequency and urgency.   Musculoskeletal: Negative for arthralgias, back pain, gait problem, joint  "swelling and myalgias.   Skin: Negative for rash.   Neurological: Negative for dizziness, tremors, syncope, weakness, light-headedness, numbness and headaches.   Hematological: Negative for adenopathy. Does not bruise/bleed easily.   Psychiatric/Behavioral: Negative for confusion, sleep disturbance and suicidal ideas. The patient is not nervous/anxious.          Current Outpatient Medications:   •  lisinopril (PRINIVIL,ZESTRIL) 5 MG tablet, Take 5 mg by mouth Daily., Disp: , Rfl:   •  albuterol (PROVENTIL HFA;VENTOLIN HFA) 108 (90 BASE) MCG/ACT inhaler, Inhale 2 puffs every 4 (four) hours as needed for wheezing or shortness of air., Disp: , Rfl:   •  fluticasone (FLONASE) 50 MCG/ACT nasal spray, , Disp: , Rfl:   •  guaiFENesin (MUCINEX) 600 MG 12 hr tablet, Take 1 tablet by mouth 2 (Two) Times a Day., Disp: 60 tablet, Rfl: 5  •  HYDROcodone-acetaminophen (NORCO) 7.5-325 MG per tablet, Take 1 tablet by mouth every 6 (six) hours as needed for moderate pain (4-6)., Disp: , Rfl:   •  levothyroxine (SYNTHROID, LEVOTHROID) 75 MCG tablet, , Disp: , Rfl:   •  omeprazole (priLOSEC) 40 MG capsule, Take 40 mg by mouth Daily., Disp: , Rfl:   •  SYMBICORT 160-4.5 MCG/ACT inhaler, INL 2 PFS PO BID, Disp: , Rfl: 3  No current facility-administered medications for this visit.     PHYSICAL EXAMINATION:   /75   Pulse 65   Temp 97.5 °F (36.4 °C) (Temporal)   Resp 16   Ht 167.6 cm (66\")   Wt 78.5 kg (173 lb)   SpO2 95%   BMI 27.92 kg/m²    ECOG Performance Status: 1 - Symptomatic but completely ambulatory  General Appearance:  alert, cooperative, no apparent distress and appears stated age   Neurologic/Psychiatric: A&O x 3, gait steady, appropriate affect, strength 5/5 in all muscle groups   HEENT:  Normocephalic, without obvious abnormality, mucous membranes moist   Neck: Supple, symmetrical, trachea midline, no adenopathy;  No thyromegaly, masses, or tenderness   Lungs:   Clear to auscultation bilaterally; respirations " regular, even, and unlabored bilaterally   Heart:  Regular rate and rhythm, no murmurs appreciated   Abdomen:   Soft, non-tender, non-distended and no organomegaly   Lymph nodes: No cervical, supraclavicular, inguinal or axillary adenopathy noted   Extremities: Normal, atraumatic; no clubbing, cyanosis, or edema    Skin: No rashes, ulcers, or suspicious lesions noted     Hospital Outpatient Visit on 06/10/2019   Component Date Value Ref Range Status   • Glucose 06/10/2019 77  70 - 130 mg/dL Final        Nm Pet Skull Base To Mid Thigh    Result Date: 6/10/2019  Narrative: EXAMINATION: NM PET SKULL BASE TO MID THIGH- 06/10/2019  INDICATION: restaging lung cancer, history of sarcoidosis; C34.32-Malignant neoplasm of lower lobe, left bronchus or lung; C34.11-Malignant neoplasm of upper lobe, right bronchus or lung  TECHNIQUE: With fasting blood glucose level of 77 mg/dL a total of 11.15 mCi of FDG was administered via the right antecubital vein. Following appropriate delay PET/CT imaging was obtained from the skull vertex to the mid thighs bilaterally and fused multiplanar images were reconstructed. The CT scan is an unenhanced study and used for reference to the PET scan only and should not be considered a diagnostic CT scan. PET/CT imaging was reviewed in the axial, coronal, and sagittal planes as well as within the cine modes.  COMPARISON: Subsequent exam for treatment with prior study available for comparison of 06/06/2017 and 12/10/2018.  FINDINGS: Normal variant activity identified within the oropharynx and muscles of phonation.  Normal variant activity identified in the region of the vocal cords. No hypermetabolic activity identified to suggest evidence of metastatic disease within the neck. Within the right supraclavicular region there is a lymph node identified with maximum SUV of 2.9 on today's examination and this area previously had maximum SUV of 2.0. The mass identified within the left upper lobe today has  maximum SUV of 9.1 and previously demonstrated maximum SUV of 8.8. There is adenopathy identified in the AP window today with maximum SUV of 4.1 and previous maximum SUV measured 3.6. The adenopathy identified within the left hilar region today has maximum SUV of 3.5 and previous maximum SUV of 3.0. There is also for example a nodule posteriorly within the right lower lobe with today's maximum issue of 3.7 and previous maximum SUV in this pulmonary nodule is 3.0. Overall there is slight interval worsening and progression of the activity within the pre-existing areas of disease in the interval. There are no new areas identified. The abdomen and pelvis reveals normal variant activity seen within the GI and  tract. No evidence of hypermetabolic activity to suggest evidence of metastatic disease.      Impression: Only mild progression of disease with interval increase seen in the activity in the existing lesions of disease within the chest. No new areas of disease identified.   D:  06/10/2019 E:  06/10/2019  This report was finalized on 6/10/2019 4:24 PM by Dr. Henrietta Menjivar MD.    (  ASSESSMENT: The patient is a very pleasant 61 y.o. male with metastatic adenocarcinoma of the lung    PROBLEM LIST:  1. Non-small cell lung cancer originally diagnosed in 2009 status post right upper lobectomy done by Dr. Real in 2009.  2. Left upper lobe lung nodules found on surveillance CT in 2012, status post left upper lobe wedge resection with benign pathology.  3. New left lower lobe lung nodule found on chest x-ray done 6/2016 after presentation with cough, shortness of air, and wheezing unresponsive to oral antibiotics.   4. CAT scan of the chest done 6/16/2016 showing interval enlargement of left lung nodules. PET CT showing hypermetabolic activity in the left lower lobe nodule, no pet avid right lower lobe nodule, with mild activity in left mediastinal AP window and left hilar lymphadenopathy.   5. CT guided biopsy of  left lower lobe nodule positive for adenocarcinoma of the lung. [id number], stage Ia.  6. Status post CyberKnife therapy completed 9/29/2016 per Dr. Lesia Pinzon  7. Sarcoidosis  8. Hypothyroidism    PLAN:  1. I reviewed the results of the PET scan with the patient and his wife.  I reviewed the films myself.  I went over the scans with the patient and his wife, we have compared the current films to previous study done December 2018.  I discussed the case with Dr. Lyons from radiology to coordinate patient care.  I told the patient he has stable findings with no evidence of new or progressive disease.  He continued to have significant hypermetabolic bilateral abnormalities which could be easily explained by his underlying interstitial lung disease as well as sarcoidosis.  I told the patient there is still possible that 1 of those lesions might have underlying malignancy were given the fact that SUV uptake is essentially the same with minimal increases we will continue watchful waiting at this point.  2. The patient will come back to see us in 6 months with repeat PET scan prior to return.   3. We will continue to monitor the patient's labs with CBC and CMP on return.   4.  The patient can continue Mucinex 600 mg take by mouth twice per day as needed for congestion and cough.   5.  He will continue follow up with pulmonary in [place] for management of sarcoidosis with use of albuterol and Symbicort for COPD and shortness of breath.     6. He will continue levothyroxine for hypothyroidism.     Padmaja Denny MD  6/10/2019

## 2019-10-18 ENCOUNTER — TELEPHONE (OUTPATIENT)
Dept: ONCOLOGY | Facility: CLINIC | Age: 64
End: 2019-10-18

## 2019-10-18 DIAGNOSIS — C34.11 MALIGNANT NEOPLASM OF UPPER LOBE OF RIGHT LUNG (HCC): ICD-10-CM

## 2019-10-18 DIAGNOSIS — C34.32 MALIGNANT NEOPLASM OF LOWER LOBE OF LEFT LUNG (HCC): Primary | ICD-10-CM

## 2019-10-18 NOTE — TELEPHONE ENCOUNTER
----- Message from Henrietta Salmon RN sent at 10/18/2019  2:09 PM EDT -----  Regarding: Coughing blood clots  Contact: [phone]  Patient's wife called triage line reporting that patient has been coughing up blood clots. Please advise. Thank you.

## 2019-10-18 NOTE — TELEPHONE ENCOUNTER
Patient's wife states that patient has been spitting up blood clots over the past 4 weeks, but has gotten worse over the last couple of days. He had scans done at [facility] that showed increased size of lung nodule. Discussed with MUKESH Nance, and will schedule PET scan to be done for patient next week and to see Dr Denny after.  Message sent to scheduling to call pt with appt info

## 2019-10-25 ENCOUNTER — OFFICE VISIT (OUTPATIENT)
Dept: ONCOLOGY | Facility: CLINIC | Age: 64
End: 2019-10-25

## 2019-10-25 ENCOUNTER — LAB (OUTPATIENT)
Dept: LAB | Facility: HOSPITAL | Age: 64
End: 2019-10-25

## 2019-10-25 ENCOUNTER — HOSPITAL ENCOUNTER (OUTPATIENT)
Dept: PET IMAGING | Facility: HOSPITAL | Age: 64
Discharge: HOME | End: 2019-10-25

## 2019-10-25 ENCOUNTER — HOSPITAL ENCOUNTER (OUTPATIENT)
Dept: PET IMAGING | Facility: HOSPITAL | Age: 64
Discharge: HOME | End: 2019-10-25
Admitting: FAMILY MEDICINE

## 2019-10-25 VITALS
RESPIRATION RATE: 16 BRPM | HEART RATE: 60 BPM | TEMPERATURE: 97.5 F | DIASTOLIC BLOOD PRESSURE: 76 MMHG | HEIGHT: 66 IN | BODY MASS INDEX: 26.84 KG/M2 | SYSTOLIC BLOOD PRESSURE: 185 MMHG | WEIGHT: 167 LBS | OXYGEN SATURATION: 97 %

## 2019-10-25 DIAGNOSIS — C34.11 MALIGNANT NEOPLASM OF UPPER LOBE OF RIGHT LUNG (HCC): ICD-10-CM

## 2019-10-25 DIAGNOSIS — C34.32 MALIGNANT NEOPLASM OF LOWER LOBE OF LEFT LUNG (HCC): Primary | ICD-10-CM

## 2019-10-25 DIAGNOSIS — C34.32 MALIGNANT NEOPLASM OF LOWER LOBE OF LEFT LUNG (HCC): ICD-10-CM

## 2019-10-25 LAB
ALBUMIN SERPL-MCNC: 4.2 G/DL (ref 3.5–5.2)
ALBUMIN/GLOB SERPL: 1.4 G/DL
ALP SERPL-CCNC: 77 U/L (ref 39–117)
ALT SERPL W P-5'-P-CCNC: 15 U/L (ref 1–41)
ANION GAP SERPL CALCULATED.3IONS-SCNC: 10 MMOL/L (ref 5–15)
AST SERPL-CCNC: 25 U/L (ref 1–40)
BASOPHILS # BLD AUTO: 0.04 10*3/MM3 (ref 0–0.2)
BASOPHILS NFR BLD AUTO: 0.7 % (ref 0–1.5)
BILIRUB SERPL-MCNC: 0.3 MG/DL (ref 0.2–1.2)
BUN BLD-MCNC: 7 MG/DL (ref 8–23)
BUN/CREAT SERPL: 8.1 (ref 7–25)
CALCIUM SPEC-SCNC: 9.1 MG/DL (ref 8.6–10.5)
CHLORIDE SERPL-SCNC: 101 MMOL/L (ref 98–107)
CO2 SERPL-SCNC: 30 MMOL/L (ref 22–29)
CREAT BLD-MCNC: 0.86 MG/DL (ref 0.76–1.27)
DEPRECATED RDW RBC AUTO: 43.8 FL (ref 37–54)
EOSINOPHIL # BLD AUTO: 0.13 10*3/MM3 (ref 0–0.4)
EOSINOPHIL NFR BLD AUTO: 2.1 % (ref 0.3–6.2)
ERYTHROCYTE [DISTWIDTH] IN BLOOD BY AUTOMATED COUNT: 13.1 % (ref 12.3–15.4)
GFR SERPL CREATININE-BSD FRML MDRD: 89 ML/MIN/1.73
GLOBULIN UR ELPH-MCNC: 3 GM/DL
GLUCOSE BLD-MCNC: 96 MG/DL (ref 65–99)
GLUCOSE BLDC GLUCOMTR-MCNC: 115 MG/DL (ref 70–130)
HCT VFR BLD AUTO: 42.3 % (ref 37.5–51)
HGB BLD-MCNC: 13.9 G/DL (ref 13–17.7)
IMM GRANULOCYTES # BLD AUTO: 0.02 10*3/MM3 (ref 0–0.05)
IMM GRANULOCYTES NFR BLD AUTO: 0.3 % (ref 0–0.5)
LYMPHOCYTES # BLD AUTO: 1.19 10*3/MM3 (ref 0.7–3.1)
LYMPHOCYTES NFR BLD AUTO: 19.5 % (ref 19.6–45.3)
MCH RBC QN AUTO: 30.2 PG (ref 26.6–33)
MCHC RBC AUTO-ENTMCNC: 32.9 G/DL (ref 31.5–35.7)
MCV RBC AUTO: 91.8 FL (ref 79–97)
MONOCYTES # BLD AUTO: 0.5 10*3/MM3 (ref 0.1–0.9)
MONOCYTES NFR BLD AUTO: 8.2 % (ref 5–12)
NEUTROPHILS # BLD AUTO: 4.21 10*3/MM3 (ref 1.7–7)
NEUTROPHILS NFR BLD AUTO: 69.2 % (ref 42.7–76)
NRBC BLD AUTO-RTO: 0 /100 WBC (ref 0–0.2)
PLATELET # BLD AUTO: 236 10*3/MM3 (ref 140–450)
PMV BLD AUTO: 10.7 FL (ref 6–12)
POTASSIUM BLD-SCNC: 4.7 MMOL/L (ref 3.5–5.2)
PROT SERPL-MCNC: 7.2 G/DL (ref 6–8.5)
RBC # BLD AUTO: 4.61 10*6/MM3 (ref 4.14–5.8)
SODIUM BLD-SCNC: 141 MMOL/L (ref 136–145)
WBC NRBC COR # BLD: 6.09 10*3/MM3 (ref 3.4–10.8)

## 2019-10-25 PROCEDURE — 85025 COMPLETE CBC W/AUTO DIFF WBC: CPT

## 2019-10-25 PROCEDURE — 0 FLUDEOXYGLUCOSE F18 SOLUTION: Performed by: NURSE PRACTITIONER

## 2019-10-25 PROCEDURE — A9552 F18 FDG: HCPCS | Performed by: NURSE PRACTITIONER

## 2019-10-25 PROCEDURE — 80053 COMPREHEN METABOLIC PANEL: CPT

## 2019-10-25 PROCEDURE — 78815 PET IMAGE W/CT SKULL-THIGH: CPT

## 2019-10-25 PROCEDURE — 36415 COLL VENOUS BLD VENIPUNCTURE: CPT

## 2019-10-25 PROCEDURE — 82962 GLUCOSE BLOOD TEST: CPT

## 2019-10-25 PROCEDURE — 99214 OFFICE O/P EST MOD 30 MIN: CPT | Performed by: INTERNAL MEDICINE

## 2019-10-25 RX ADMIN — FLUDEOXYGLUCOSE F18 1 DOSE: 300 INJECTION INTRAVENOUS at 13:27

## 2019-10-25 NOTE — PROGRESS NOTES
DATE OF VISIT: 10/25/2019    REASON FOR VISIT: Stage Ia adenocarcinoma of the lung [id number].      HISTORY OF PRESENT ILLNESS: The patient is a very pleasant 64 y.o. male  with past medical history significant for lung cancer diagnosed 6/22/2016 . 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 PET scan done 6/22/2016 showed findings consistent with recurrent neoplasm in the left upper lobe, with metastatic adenopathy to the left hilum and AP window as well as a metastatic nodule in the left lower lobe. CT-guided biopsy of the left lower lobe nodule was performed that showed adenocarcinoma of the lung. The patient underwent CyberKnife therapy by Dr Pinzon completed 9/29/2016.  The patient is here today for scheduled follow up visit.     SUBJECTIVE: The patient is here today with his wife.  He is complaining of coughing up blood 4 times a day small amount to each cough.  He saw his local pulmonologist who has left [facility] and he likes to establish care with a pulmonologist at [facility].  CT chest was essentially stable.    PAST MEDICAL HISTORY/SOCIAL HISTORY/FAMILY HISTORY: Unchanged from my prior documentation done on 08/02/2016.     Review of Systems   Constitutional: Negative for activity change, appetite change, chills, fatigue, fever and unexpected weight change.   HENT: Negative for hearing loss, mouth sores, nosebleeds, sore throat and trouble swallowing.    Eyes: Negative for visual disturbance.   Respiratory: Positive for shortness of breath and wheezing. Negative for cough and chest tightness.         With activity   Cardiovascular: Negative for chest pain, palpitations and leg swelling.   Gastrointestinal: Negative for abdominal distention, abdominal pain, blood in stool, constipation, diarrhea, nausea, rectal pain and vomiting.   Endocrine: Negative for cold intolerance and heat intolerance.   Genitourinary:  "Negative for difficulty urinating, dysuria, frequency and urgency.   Musculoskeletal: Negative for arthralgias, back pain, gait problem, joint swelling and myalgias.   Skin: Negative for rash.   Neurological: Negative for dizziness, tremors, syncope, weakness, light-headedness, numbness and headaches.   Hematological: Negative for adenopathy. Does not bruise/bleed easily.   Psychiatric/Behavioral: Negative for confusion, sleep disturbance and suicidal ideas. The patient is not nervous/anxious.          Current Outpatient Medications:   •  albuterol (PROVENTIL HFA;VENTOLIN HFA) 108 (90 BASE) MCG/ACT inhaler, Inhale 2 puffs every 4 (four) hours as needed for wheezing or shortness of air., Disp: , Rfl:   •  fluticasone (FLONASE) 50 MCG/ACT nasal spray, , Disp: , Rfl:   •  guaiFENesin (MUCINEX) 600 MG 12 hr tablet, Take 1 tablet by mouth 2 (Two) Times a Day., Disp: 60 tablet, Rfl: 5  •  HYDROcodone-acetaminophen (NORCO) 7.5-325 MG per tablet, Take 1 tablet by mouth every 6 (six) hours as needed for moderate pain (4-6)., Disp: , Rfl:   •  levothyroxine (SYNTHROID, LEVOTHROID) 75 MCG tablet, , Disp: , Rfl:   •  omeprazole (priLOSEC) 40 MG capsule, Take 40 mg by mouth Daily., Disp: , Rfl:   •  SYMBICORT 160-4.5 MCG/ACT inhaler, INL 2 PFS PO BID, Disp: , Rfl: 3    PHYSICAL EXAMINATION:   BP (!) 185/76   Pulse 60   Temp 97.5 °F (36.4 °C) (Temporal)   Resp 16   Ht 167.6 cm (66\")   Wt 75.8 kg (167 lb)   SpO2 97%   BMI 26.95 kg/m²    ECOG Performance Status: 1 - Symptomatic but completely ambulatory  General Appearance:  alert, cooperative, no apparent distress and appears stated age   Neurologic/Psychiatric: A&O x 3, gait steady, appropriate affect, strength 5/5 in all muscle groups   HEENT:  Normocephalic, without obvious abnormality, mucous membranes moist   Neck: Supple, symmetrical, trachea midline, no adenopathy;  No thyromegaly, masses, or tenderness   Lungs:   Clear to auscultation bilaterally; respirations " regular, even, and unlabored bilaterally   Heart:  Regular rate and rhythm, no murmurs appreciated   Abdomen:   Soft, non-tender, non-distended and no organomegaly   Lymph nodes: No cervical, supraclavicular, inguinal or axillary adenopathy noted   Extremities: Normal, atraumatic; no clubbing, cyanosis, or edema    Skin: No rashes, ulcers, or suspicious lesions noted     No visits with results within 2 Week(s) from this visit.   Latest known visit with results is:   Hospital Outpatient Visit on 06/10/2019   Component Date Value Ref Range Status   • Glucose 06/10/2019 77  70 - 130 mg/dL Final        No results found.(  ASSESSMENT: The patient is a very pleasant 61 y.o. male with metastatic adenocarcinoma of the lung    PROBLEM LIST:  1. Non-small cell lung cancer originally diagnosed in 2009 status post right upper lobectomy done by Dr. Real in 2009.  2. Left upper lobe lung nodules found on surveillance CT in 2012, status post left upper lobe wedge resection with benign pathology.  3. New left lower lobe lung nodule found on chest x-ray done 6/2016 after presentation with cough, shortness of air, and wheezing unresponsive to oral antibiotics.   4. CAT scan of the chest done 6/16/2016 showing interval enlargement of left lung nodules. PET CT showing hypermetabolic activity in the left lower lobe nodule, no pet avid right lower lobe nodule, with mild activity in left mediastinal AP window and left hilar lymphadenopathy.   5. CT guided biopsy of left lower lobe nodule positive for adenocarcinoma of the lung. [id number], stage Ia.  6. Status post CyberKnife therapy completed 9/29/2016 per Dr. Lesia Pinzon  7. Sarcoidosis  8. Hypothyroidism    PLAN:  1. I reviewed the results of the previous PET scan with the patient and his wife.  I reviewed the films myself.  I went over the scans with the patient and his wife. I told the patient he has stable findings with no evidence of new or progressive disease.  He continued to have  significant hypermetabolic bilateral abnormalities which could be easily explained by his underlying interstitial lung disease as well as sarcoidosis.  I told the patient there is still possible that 1 of those lesions might have underlying malignancy.  Given his new symptoms I will repeat his PET scan underscore the patient with the results.  2. The patient will come back to see us in 6 months with repeat PET scan prior to return.   3. We will continue to monitor the patient's labs with CBC and CMP on return.   4.  The patient can continue Mucinex 600 mg take by mouth twice per day as needed for congestion and cough.   5.  I will refer the patient to meet with Dr. You from pulmonary here to establish care with local oncologist.  If patient's symptoms persist and his scans are stable he may need to get bronchoscopy.  6. He will continue levothyroxine for hypothyroidism.     Padmaja Denny MD  10/25/2019

## 2019-10-28 ENCOUNTER — TELEPHONE (OUTPATIENT)
Dept: ONCOLOGY | Facility: CLINIC | Age: 64
End: 2019-10-28

## 2019-10-28 NOTE — TELEPHONE ENCOUNTER
Rhina informed of PET showing positive lymph node, but no explanation for his hemoptysis. He will discuss patient's case at tumor board tomorrow morning, and will call patient back with recommendation

## 2019-10-29 NOTE — TELEPHONE ENCOUNTER
Pt informed that per tumor board recommendation, we will likely get him set up with Dr You for a scope/biopsy. Dr cavazos will be speaking with Dr You to get this planned, and I will keep pt updated

## 2019-11-04 ENCOUNTER — OFFICE VISIT (OUTPATIENT)
Dept: PULMONOLOGY | Facility: CLINIC | Age: 64
End: 2019-11-04

## 2019-11-04 VITALS
HEART RATE: 56 BPM | WEIGHT: 169.38 LBS | TEMPERATURE: 97.6 F | BODY MASS INDEX: 27.22 KG/M2 | SYSTOLIC BLOOD PRESSURE: 162 MMHG | DIASTOLIC BLOOD PRESSURE: 104 MMHG | HEIGHT: 66 IN | RESPIRATION RATE: 16 BRPM | OXYGEN SATURATION: 93 %

## 2019-11-04 DIAGNOSIS — J60 CWP (COALWORKERS PNEUMOCONIOSIS) (HCC): ICD-10-CM

## 2019-11-04 DIAGNOSIS — J44.9 CHRONIC OBSTRUCTIVE PULMONARY DISEASE, UNSPECIFIED COPD TYPE (HCC): ICD-10-CM

## 2019-11-04 DIAGNOSIS — C34.90 PRIMARY MALIGNANT NEOPLASM OF LUNG METASTATIC TO OTHER SITE, UNSPECIFIED LATERALITY (HCC): ICD-10-CM

## 2019-11-04 DIAGNOSIS — R04.2 HEMOPTYSIS: Primary | ICD-10-CM

## 2019-11-04 PROCEDURE — 99214 OFFICE O/P EST MOD 30 MIN: CPT | Performed by: INTERNAL MEDICINE

## 2019-11-04 RX ORDER — TAMSULOSIN HYDROCHLORIDE 0.4 MG/1
1 CAPSULE ORAL DAILY
Qty: 30 CAPSULE | Refills: 6 | Status: SHIPPED | OUTPATIENT
Start: 2019-11-04 | End: 2020-07-07 | Stop reason: SDUPTHER

## 2019-11-04 RX ORDER — LEVOCETIRIZINE DIHYDROCHLORIDE 5 MG/1
5 TABLET, FILM COATED ORAL EVERY EVENING
COMMUNITY
Start: 2019-10-28

## 2019-11-04 NOTE — PROGRESS NOTES
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  Patient was diagnosed with RUL NSCLC and had a RUL Lobectomy in 2009, presumably early stage - did not require chemo / RT.  He then had a new nodule in 2012 and had a RUBIO wedge resection that showed CWP.  In 2016 patient had a new LLL nodule with CT guided biopsy + for NSCLC and he underwent SBRT.  At this same time he had EBUS of his mediastinal LN's showing anthracotic pigment and non-necrotizing granulomas.  Had been told he had sarcoid after original lung cancer surgery in 2009 (those path reports are at [facility]).  He was never treated with steroids, and never had any evaluation to look for extra-pulmonary sarcoidosis.    He comes in today for 1 month of hemoptysis.  Coughing up a spoonful at a time, several times a day.  No trouble breathing - able to go hiking several miles.  No fever, chills, nightsweats, weight loss, etc.  He just a PET done 10/25/19 that shows stable nodules and mediastinal lymph nodes (c/w scar and CWP) but a new extensive reticular and GG infiltrate in the RUBIO.  There was also an enlarging metabolically active right supraclavicular lymph node that was new from PET 6/10/19.      PMH:    Past Medical History:   Diagnosis Date   • Black lung (CMS/HCC)    • COPD (chronic obstructive pulmonary disease) (CMS/HCC)    • Hypothyroidism    • Kidney stone    • Lung cancer (CMS/HCC)    • Sarcoidosis      PSH:    Past Surgical History:   Procedure Laterality Date   • BRONCHOSCOPY N/A 8/26/2016    Procedure: CHASITY BRONCHOSCOPY WITH PLACEMENT OF FIDUCIALS, EBUS WITH FLOURO;  Surgeon: Tommy You MD;  Location: [facility] ENDOSCOPY;  Service:    • COLONOSCOPY  12/18/2007   • INGUINAL HERNIA REPAIR  Bilateral    • LUNG BIOPSY Left 2016   • LUNG LOBECTOMY Right 2009    RIGHT UPPER LOBECTOMY   • LYMPH NODE BIOPSY      NECK   • THORACOTOMY Left     LEFT THORACOTOMY WITH WEDGE RESECTION     FH:    Family History   Problem Relation Age of Onset   • Colon cancer Mother    • Other Father    • Lung cancer Brother      SH:    Social History     Socioeconomic History   • Marital status:      Spouse name: Not on file   • Number of children: 0   • Years of education: Not on file   • Highest education level: Not on file   Occupational History     Employer: DISABLED   Tobacco Use   • Smoking status: Former Smoker     Packs/day: 1.50     Years: 25.00     Pack years: 37.50     Types: Cigarettes     Last attempt to quit: 2006     Years since quittin.2   • Smokeless tobacco: Never Used   Substance and Sexual Activity   • Alcohol use: No   • Drug use: No   • Sexual activity: Yes     Partners: Female     Comment:     Social History Narrative        Previously worked in the [employer].    Smoked 1-1/2ppd for 25+ years, none since     No ETOh or illicit drugs     ALLERGIES:    No Known Allergies  MEDICATIONS:      Current Outpatient Medications:   •  albuterol (PROVENTIL HFA;VENTOLIN HFA) 108 (90 BASE) MCG/ACT inhaler, Inhale 2 puffs every 4 (four) hours as needed for wheezing or shortness of air., Disp: , Rfl:   •  fluticasone (FLONASE) 50 MCG/ACT nasal spray, 2 sprays into the nostril(s) as directed by provider Daily., Disp: , Rfl:   •  guaiFENesin (MUCINEX) 600 MG 12 hr tablet, Take 1 tablet by mouth 2 (Two) Times a Day., Disp: 60 tablet, Rfl: 5  •  HYDROcodone-acetaminophen (NORCO) 7.5-325 MG per tablet, Take 1 tablet by mouth every 6 (six) hours as needed for moderate pain (4-6)., Disp: , Rfl:   •  levocetirizine (XYZAL) 5 MG tablet, Take 5 mg by mouth Every Evening., Disp: , Rfl:   •  levothyroxine (SYNTHROID, LEVOTHROID) 75 MCG tablet, Take 75 mcg by mouth Daily., Disp: , Rfl:   •   omeprazole (priLOSEC) 40 MG capsule, Take 40 mg by mouth Daily., Disp: , Rfl:   •  SYMBICORT 160-4.5 MCG/ACT inhaler, INL 2 PFS PO BID, Disp: , Rfl: 3  ROS:  Per HPI, otherwise all systems reviewed and negative.    DIAGNOSTIC DATA (Reviewed and interpreted by me unless otherwise specified):    PET CT 10/25/19 - extensive reticular and GG infiltrate in RUBIO, other areas of CWP / scar stable from prior PET 6/10/19 with exception of right SC LN    Vitals:    11/04/19 0942   BP: (!) 162/104   Pulse: 56   Resp: 16   Temp: 97.6 °F (36.4 °C)   SpO2: 93%       Physical Exam   Constitutional: Oriented to person, place, and time. Appears well-developed and well-nourished.   Head: Normocephalic and atraumatic.   Nose: Nose normal.   Mouth/Throat: Oropharynx is clear and moist.   Eyes: Conjunctivae are normal.  Pupils normal.  Neck: No tracheal deviation present.   Cardiovascular: Normal rate, regular rhythm, normal heart sounds and intact distal pulses.  Exam reveals no gallop and no friction rub.  No thrill.  No JVD.  No edema.  No murmur heard.  Pulmonary/Chest: Effort normal and breath sounds normal.  No tenderness to palpation.  No clubbing.   Abdominal: Soft. Bowel sounds are normal. No distension. No tenderness. There is no guarding.   Musculoskeletal: Normal range of motion.  No tenderness.  Lymphadenopathy:  No cervical adenopathy.   Neurological:  No new focal neurological deficits observed   Skin: Skin is warm and dry. No rash noted.   Psychiatric: Normal mood and affect.  Behavior is normal. Judgment normal.    Assessment/Plan     1)  Abnormal Imaging / Hemoptysis - first of all I do no think he has sarcoidosis.  I think he has some granulomatous inflammation related to coal workers pneumoconiosis that does not need any further evaluation / treatment.  SANAZ is unfortunately PET positive, but he has also had 2 separate early stage lung cancers.  The hemoptysis is the most pressing issue.  Will schedule for bronch with  careful airway exam, transbronchial biopsies of RUBIO (r/o lymphangitic spread), cultures, and EBUS mediastinal / hilar LN's to r/o active malignancy.  If unable to localize and treat source of hemoptysis in proximal airways he may need bronchial artery embolization (preferred) vs resection (would be difficult given prior RUL lobectomy / RUBIO wedge / scar tissue).  If path from bronch is negative, he will need excisional biopsy of RSC LN.  Complicated case.  Discussed with Dr. Denny.    Bronch this week, call with results, will arrange follow up    RTC 3 months w/ full PFT    Arnold You MD  Pulmonology and Critical Care Medicine  11/04/19 11:34 AM  Electronically Signed    C.C.:  Padmaja Denny MD, Michael Schreiber MD

## 2019-11-04 NOTE — H&P (VIEW-ONLY)
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  Patient was diagnosed with RUL NSCLC and had a RUL Lobectomy in 2009, presumably early stage - did not require chemo / RT.  He then had a new nodule in 2012 and had a RUBIO wedge resection that showed CWP.  In 2016 patient had a new LLL nodule with CT guided biopsy + for NSCLC and he underwent SBRT.  At this same time he had EBUS of his mediastinal LN's showing anthracotic pigment and non-necrotizing granulomas.  Had been told he had sarcoid after original lung cancer surgery in 2009 (those path reports are at [facility]).  He was never treated with steroids, and never had any evaluation to look for extra-pulmonary sarcoidosis.    He comes in today for 1 month of hemoptysis.  Coughing up a spoonful at a time, several times a day.  No trouble breathing - able to go hiking several miles.  No fever, chills, nightsweats, weight loss, etc.  He just a PET done 10/25/19 that shows stable nodules and mediastinal lymph nodes (c/w scar and CWP) but a new extensive reticular and GG infiltrate in the RUBIO.  There was also an enlarging metabolically active right supraclavicular lymph node that was new from PET 6/10/19.      PMH:    Past Medical History:   Diagnosis Date   • Black lung (CMS/HCC)    • COPD (chronic obstructive pulmonary disease) (CMS/HCC)    • Hypothyroidism    • Kidney stone    • Lung cancer (CMS/HCC)    • Sarcoidosis      PSH:    Past Surgical History:   Procedure Laterality Date   • BRONCHOSCOPY N/A 8/26/2016    Procedure: CHASITY BRONCHOSCOPY WITH PLACEMENT OF FIDUCIALS, EBUS WITH FLOURO;  Surgeon: Tommy You MD;  Location: [facility] ENDOSCOPY;  Service:    • COLONOSCOPY  12/18/2007   • INGUINAL HERNIA REPAIR  Bilateral    • LUNG BIOPSY Left 2016   • LUNG LOBECTOMY Right 2009    RIGHT UPPER LOBECTOMY   • LYMPH NODE BIOPSY      NECK   • THORACOTOMY Left     LEFT THORACOTOMY WITH WEDGE RESECTION     FH:    Family History   Problem Relation Age of Onset   • Colon cancer Mother    • Other Father    • Lung cancer Brother      SH:    Social History     Socioeconomic History   • Marital status:      Spouse name: Not on file   • Number of children: 0   • Years of education: Not on file   • Highest education level: Not on file   Occupational History     Employer: DISABLED   Tobacco Use   • Smoking status: Former Smoker     Packs/day: 1.50     Years: 25.00     Pack years: 37.50     Types: Cigarettes     Last attempt to quit: 2006     Years since quittin.2   • Smokeless tobacco: Never Used   Substance and Sexual Activity   • Alcohol use: No   • Drug use: No   • Sexual activity: Yes     Partners: Female     Comment:     Social History Narrative        Previously worked in the [employer].    Smoked 1-1/2ppd for 25+ years, none since     No ETOh or illicit drugs     ALLERGIES:    No Known Allergies  MEDICATIONS:      Current Outpatient Medications:   •  albuterol (PROVENTIL HFA;VENTOLIN HFA) 108 (90 BASE) MCG/ACT inhaler, Inhale 2 puffs every 4 (four) hours as needed for wheezing or shortness of air., Disp: , Rfl:   •  fluticasone (FLONASE) 50 MCG/ACT nasal spray, 2 sprays into the nostril(s) as directed by provider Daily., Disp: , Rfl:   •  guaiFENesin (MUCINEX) 600 MG 12 hr tablet, Take 1 tablet by mouth 2 (Two) Times a Day., Disp: 60 tablet, Rfl: 5  •  HYDROcodone-acetaminophen (NORCO) 7.5-325 MG per tablet, Take 1 tablet by mouth every 6 (six) hours as needed for moderate pain (4-6)., Disp: , Rfl:   •  levocetirizine (XYZAL) 5 MG tablet, Take 5 mg by mouth Every Evening., Disp: , Rfl:   •  levothyroxine (SYNTHROID, LEVOTHROID) 75 MCG tablet, Take 75 mcg by mouth Daily., Disp: , Rfl:   •   omeprazole (priLOSEC) 40 MG capsule, Take 40 mg by mouth Daily., Disp: , Rfl:   •  SYMBICORT 160-4.5 MCG/ACT inhaler, INL 2 PFS PO BID, Disp: , Rfl: 3  ROS:  Per HPI, otherwise all systems reviewed and negative.    DIAGNOSTIC DATA (Reviewed and interpreted by me unless otherwise specified):    PET CT 10/25/19 - extensive reticular and GG infiltrate in RUBIO, other areas of CWP / scar stable from prior PET 6/10/19 with exception of right SC LN    Vitals:    11/04/19 0942   BP: (!) 162/104   Pulse: 56   Resp: 16   Temp: 97.6 °F (36.4 °C)   SpO2: 93%       Physical Exam   Constitutional: Oriented to person, place, and time. Appears well-developed and well-nourished.   Head: Normocephalic and atraumatic.   Nose: Nose normal.   Mouth/Throat: Oropharynx is clear and moist.   Eyes: Conjunctivae are normal.  Pupils normal.  Neck: No tracheal deviation present.   Cardiovascular: Normal rate, regular rhythm, normal heart sounds and intact distal pulses.  Exam reveals no gallop and no friction rub.  No thrill.  No JVD.  No edema.  No murmur heard.  Pulmonary/Chest: Effort normal and breath sounds normal.  No tenderness to palpation.  No clubbing.   Abdominal: Soft. Bowel sounds are normal. No distension. No tenderness. There is no guarding.   Musculoskeletal: Normal range of motion.  No tenderness.  Lymphadenopathy:  No cervical adenopathy.   Neurological:  No new focal neurological deficits observed   Skin: Skin is warm and dry. No rash noted.   Psychiatric: Normal mood and affect.  Behavior is normal. Judgment normal.    Assessment/Plan     1)  Abnormal Imaging / Hemoptysis - first of all I do no think he has sarcoidosis.  I think he has some granulomatous inflammation related to coal workers pneumoconiosis that does not need any further evaluation / treatment.  SANAZ is unfortunately PET positive, but he has also had 2 separate early stage lung cancers.  The hemoptysis is the most pressing issue.  Will schedule for bronch with  careful airway exam, transbronchial biopsies of RUBIO (r/o lymphangitic spread), cultures, and EBUS mediastinal / hilar LN's to r/o active malignancy.  If unable to localize and treat source of hemoptysis in proximal airways he may need bronchial artery embolization (preferred) vs resection (would be difficult given prior RUL lobectomy / RUBIO wedge / scar tissue).  If path from bronch is negative, he will need excisional biopsy of RSC LN.  Complicated case.  Discussed with Dr. Denny.    Bronch this week, call with results, will arrange follow up    RTC 3 months w/ full PFT    Arnold You MD  Pulmonology and Critical Care Medicine  11/04/19 11:34 AM  Electronically Signed    C.C.:  Padmaja Denny MD, Michael Schreiber MD

## 2019-11-05 ENCOUNTER — TRANSCRIBE ORDERS (OUTPATIENT)
Dept: PULMONOLOGY | Facility: CLINIC | Age: 64
End: 2019-11-05

## 2019-11-05 ENCOUNTER — APPOINTMENT (OUTPATIENT)
Dept: PREADMISSION TESTING | Facility: HOSPITAL | Age: 64
End: 2019-11-05

## 2019-11-05 VITALS — BODY MASS INDEX: 27.18 KG/M2 | WEIGHT: 169.09 LBS | HEIGHT: 66 IN

## 2019-11-05 LAB
ALBUMIN SERPL-MCNC: 4.1 G/DL (ref 3.5–5.2)
ALBUMIN/GLOB SERPL: 1.5 G/DL
ALP SERPL-CCNC: 93 U/L (ref 39–117)
ALT SERPL W P-5'-P-CCNC: 12 U/L (ref 1–41)
ANION GAP SERPL CALCULATED.3IONS-SCNC: 11 MMOL/L (ref 5–15)
APTT PPP: 29.8 SECONDS (ref 24–37)
AST SERPL-CCNC: 22 U/L (ref 1–40)
BILIRUB SERPL-MCNC: 0.3 MG/DL (ref 0.2–1.2)
BUN BLD-MCNC: 9 MG/DL (ref 8–23)
BUN/CREAT SERPL: 11.1 (ref 7–25)
CALCIUM SPEC-SCNC: 8.7 MG/DL (ref 8.6–10.5)
CHLORIDE SERPL-SCNC: 102 MMOL/L (ref 98–107)
CO2 SERPL-SCNC: 30 MMOL/L (ref 22–29)
CREAT BLD-MCNC: 0.81 MG/DL (ref 0.76–1.27)
DEPRECATED RDW RBC AUTO: 41.7 FL (ref 37–54)
ERYTHROCYTE [DISTWIDTH] IN BLOOD BY AUTOMATED COUNT: 12.7 % (ref 12.3–15.4)
GFR SERPL CREATININE-BSD FRML MDRD: 96 ML/MIN/1.73
GLOBULIN UR ELPH-MCNC: 2.8 GM/DL
GLUCOSE BLD-MCNC: 105 MG/DL (ref 65–99)
HCT VFR BLD AUTO: 41.9 % (ref 37.5–51)
HGB BLD-MCNC: 13.9 G/DL (ref 13–17.7)
INR PPP: 1.01 (ref 0.85–1.16)
MCH RBC QN AUTO: 29.8 PG (ref 26.6–33)
MCHC RBC AUTO-ENTMCNC: 33.2 G/DL (ref 31.5–35.7)
MCV RBC AUTO: 89.7 FL (ref 79–97)
PLATELET # BLD AUTO: 211 10*3/MM3 (ref 140–450)
PMV BLD AUTO: 10 FL (ref 6–12)
POTASSIUM BLD-SCNC: 3.7 MMOL/L (ref 3.5–5.2)
PROT SERPL-MCNC: 6.9 G/DL (ref 6–8.5)
PROTHROMBIN TIME: 12.8 SECONDS (ref 11.2–14.3)
RBC # BLD AUTO: 4.67 10*6/MM3 (ref 4.14–5.8)
SODIUM BLD-SCNC: 143 MMOL/L (ref 136–145)
WBC NRBC COR # BLD: 5.77 10*3/MM3 (ref 3.4–10.8)

## 2019-11-05 PROCEDURE — 85730 THROMBOPLASTIN TIME PARTIAL: CPT | Performed by: INTERNAL MEDICINE

## 2019-11-05 PROCEDURE — 36415 COLL VENOUS BLD VENIPUNCTURE: CPT

## 2019-11-05 PROCEDURE — 85027 COMPLETE CBC AUTOMATED: CPT | Performed by: INTERNAL MEDICINE

## 2019-11-05 PROCEDURE — 93010 ELECTROCARDIOGRAM REPORT: CPT | Performed by: INTERNAL MEDICINE

## 2019-11-05 PROCEDURE — 93005 ELECTROCARDIOGRAM TRACING: CPT

## 2019-11-05 PROCEDURE — 80053 COMPREHEN METABOLIC PANEL: CPT | Performed by: INTERNAL MEDICINE

## 2019-11-05 PROCEDURE — 85610 PROTHROMBIN TIME: CPT | Performed by: INTERNAL MEDICINE

## 2019-11-06 ENCOUNTER — ANESTHESIA EVENT (OUTPATIENT)
Dept: GASTROENTEROLOGY | Facility: HOSPITAL | Age: 64
End: 2019-11-06

## 2019-11-06 ENCOUNTER — APPOINTMENT (OUTPATIENT)
Dept: GENERAL RADIOLOGY | Facility: HOSPITAL | Age: 64
End: 2019-11-06

## 2019-11-06 ENCOUNTER — ANESTHESIA (OUTPATIENT)
Dept: GASTROENTEROLOGY | Facility: HOSPITAL | Age: 64
End: 2019-11-06

## 2019-11-06 ENCOUNTER — HOSPITAL ENCOUNTER (OUTPATIENT)
Facility: HOSPITAL | Age: 64
Discharge: HOME | End: 2019-11-07
Attending: INTERNAL MEDICINE | Admitting: INTERNAL MEDICINE

## 2019-11-06 DIAGNOSIS — R04.2 HEMOPTYSIS: ICD-10-CM

## 2019-11-06 PROBLEM — K21.9 GERD WITHOUT ESOPHAGITIS: Status: ACTIVE | Noted: 2019-11-06

## 2019-11-06 PROBLEM — E03.9 HYPOTHYROIDISM (ACQUIRED): Status: ACTIVE | Noted: 2019-11-06

## 2019-11-06 PROCEDURE — 76000 FLUOROSCOPY <1 HR PHYS/QHP: CPT

## 2019-11-06 PROCEDURE — 94640 AIRWAY INHALATION TREATMENT: CPT

## 2019-11-06 PROCEDURE — 31645 BRNCHSC W/THER ASPIR 1ST: CPT | Performed by: INTERNAL MEDICINE

## 2019-11-06 PROCEDURE — 31628 BRONCHOSCOPY/LUNG BX EACH: CPT | Performed by: INTERNAL MEDICINE

## 2019-11-06 PROCEDURE — 87205 SMEAR GRAM STAIN: CPT | Performed by: INTERNAL MEDICINE

## 2019-11-06 PROCEDURE — 87206 SMEAR FLUORESCENT/ACID STAI: CPT | Performed by: INTERNAL MEDICINE

## 2019-11-06 PROCEDURE — 31653 BRONCH EBUS SAMPLNG 3/> NODE: CPT | Performed by: INTERNAL MEDICINE

## 2019-11-06 PROCEDURE — 87102 FUNGUS ISOLATION CULTURE: CPT | Performed by: INTERNAL MEDICINE

## 2019-11-06 PROCEDURE — 94799 UNLISTED PULMONARY SVC/PX: CPT

## 2019-11-06 PROCEDURE — C1726 CATH, BAL DIL, NON-VASCULAR: HCPCS | Performed by: INTERNAL MEDICINE

## 2019-11-06 PROCEDURE — 88173 CYTOPATH EVAL FNA REPORT: CPT | Performed by: INTERNAL MEDICINE

## 2019-11-06 PROCEDURE — 25010000002 DEXAMETHASONE PER 1 MG: Performed by: NURSE ANESTHETIST, CERTIFIED REGISTERED

## 2019-11-06 PROCEDURE — 63710000001 BUDESONIDE-FORMOTEROL 160-4.5 MCG/ACT AEROSOL 6 G INHALER: Performed by: INTERNAL MEDICINE

## 2019-11-06 PROCEDURE — 87070 CULTURE OTHR SPECIMN AEROBIC: CPT | Performed by: INTERNAL MEDICINE

## 2019-11-06 PROCEDURE — G0378 HOSPITAL OBSERVATION PER HR: HCPCS

## 2019-11-06 PROCEDURE — 87116 MYCOBACTERIA CULTURE: CPT | Performed by: INTERNAL MEDICINE

## 2019-11-06 PROCEDURE — 99220 PR INITIAL OBSERVATION CARE/DAY 70 MINUTES: CPT | Performed by: INTERNAL MEDICINE

## 2019-11-06 PROCEDURE — 88112 CYTOPATH CELL ENHANCE TECH: CPT | Performed by: INTERNAL MEDICINE

## 2019-11-06 PROCEDURE — 71045 X-RAY EXAM CHEST 1 VIEW: CPT

## 2019-11-06 PROCEDURE — A9270 NON-COVERED ITEM OR SERVICE: HCPCS | Performed by: INTERNAL MEDICINE

## 2019-11-06 PROCEDURE — 25010000002 FENTANYL CITRATE (PF) 100 MCG/2ML SOLUTION: Performed by: NURSE ANESTHETIST, CERTIFIED REGISTERED

## 2019-11-06 PROCEDURE — 31624 DX BRONCHOSCOPE/LAVAGE: CPT | Performed by: INTERNAL MEDICINE

## 2019-11-06 PROCEDURE — 88305 TISSUE EXAM BY PATHOLOGIST: CPT | Performed by: INTERNAL MEDICINE

## 2019-11-06 PROCEDURE — 25010000002 PROPOFOL 10 MG/ML EMULSION: Performed by: NURSE ANESTHETIST, CERTIFIED REGISTERED

## 2019-11-06 RX ORDER — SODIUM CHLORIDE 0.9 % (FLUSH) 0.9 %
3-10 SYRINGE (ML) INJECTION AS NEEDED
Status: DISCONTINUED | OUTPATIENT
Start: 2019-11-06 | End: 2019-11-06 | Stop reason: HOSPADM

## 2019-11-06 RX ORDER — PANTOPRAZOLE SODIUM 40 MG/1
40 TABLET, DELAYED RELEASE ORAL EVERY MORNING
Status: DISCONTINUED | OUTPATIENT
Start: 2019-11-07 | End: 2019-11-07 | Stop reason: HOSPADM

## 2019-11-06 RX ORDER — PROMETHAZINE HYDROCHLORIDE 25 MG/1
25 TABLET ORAL ONCE AS NEEDED
Status: DISCONTINUED | OUTPATIENT
Start: 2019-11-06 | End: 2019-11-06 | Stop reason: HOSPADM

## 2019-11-06 RX ORDER — ACETAMINOPHEN 650 MG/1
650 SUPPOSITORY RECTAL ONCE AS NEEDED
Status: DISCONTINUED | OUTPATIENT
Start: 2019-11-06 | End: 2019-11-06 | Stop reason: HOSPADM

## 2019-11-06 RX ORDER — FAMOTIDINE 10 MG/ML
20 INJECTION, SOLUTION INTRAVENOUS ONCE
Status: COMPLETED | OUTPATIENT
Start: 2019-11-06 | End: 2019-11-06

## 2019-11-06 RX ORDER — IPRATROPIUM BROMIDE AND ALBUTEROL SULFATE 2.5; .5 MG/3ML; MG/3ML
3 SOLUTION RESPIRATORY (INHALATION)
Status: DISCONTINUED | OUTPATIENT
Start: 2019-11-06 | End: 2019-11-07 | Stop reason: HOSPADM

## 2019-11-06 RX ORDER — FLUTICASONE PROPIONATE 50 MCG
2 SPRAY, SUSPENSION (ML) NASAL DAILY
Status: DISCONTINUED | OUTPATIENT
Start: 2019-11-07 | End: 2019-11-07 | Stop reason: HOSPADM

## 2019-11-06 RX ORDER — ACETAMINOPHEN 650 MG/1
650 SUPPOSITORY RECTAL EVERY 4 HOURS PRN
Status: DISCONTINUED | OUTPATIENT
Start: 2019-11-06 | End: 2019-11-07 | Stop reason: HOSPADM

## 2019-11-06 RX ORDER — PROMETHAZINE HYDROCHLORIDE 25 MG/ML
6.25 INJECTION, SOLUTION INTRAMUSCULAR; INTRAVENOUS ONCE AS NEEDED
Status: DISCONTINUED | OUTPATIENT
Start: 2019-11-06 | End: 2019-11-06 | Stop reason: HOSPADM

## 2019-11-06 RX ORDER — DEXAMETHASONE SODIUM PHOSPHATE 4 MG/ML
INJECTION, SOLUTION INTRA-ARTICULAR; INTRALESIONAL; INTRAMUSCULAR; INTRAVENOUS; SOFT TISSUE AS NEEDED
Status: DISCONTINUED | OUTPATIENT
Start: 2019-11-06 | End: 2019-11-06 | Stop reason: SURG

## 2019-11-06 RX ORDER — CETIRIZINE HYDROCHLORIDE 10 MG/1
5 TABLET ORAL DAILY
Status: DISCONTINUED | OUTPATIENT
Start: 2019-11-07 | End: 2019-11-07 | Stop reason: HOSPADM

## 2019-11-06 RX ORDER — LIDOCAINE HYDROCHLORIDE 10 MG/ML
0.5 INJECTION, SOLUTION EPIDURAL; INFILTRATION; INTRACAUDAL; PERINEURAL ONCE AS NEEDED
Status: DISCONTINUED | OUTPATIENT
Start: 2019-11-06 | End: 2019-11-06 | Stop reason: HOSPADM

## 2019-11-06 RX ORDER — ONDANSETRON 2 MG/ML
4 INJECTION INTRAMUSCULAR; INTRAVENOUS EVERY 6 HOURS PRN
Status: DISCONTINUED | OUTPATIENT
Start: 2019-11-06 | End: 2019-11-07 | Stop reason: HOSPADM

## 2019-11-06 RX ORDER — SODIUM CHLORIDE 9 MG/ML
10 INJECTION, SOLUTION INTRAVENOUS CONTINUOUS
Status: DISCONTINUED | OUTPATIENT
Start: 2019-11-06 | End: 2019-11-07 | Stop reason: HOSPADM

## 2019-11-06 RX ORDER — SODIUM CHLORIDE 0.9 % (FLUSH) 0.9 %
10 SYRINGE (ML) INJECTION AS NEEDED
Status: DISCONTINUED | OUTPATIENT
Start: 2019-11-06 | End: 2019-11-07 | Stop reason: HOSPADM

## 2019-11-06 RX ORDER — CHOLECALCIFEROL (VITAMIN D3) 125 MCG
5 CAPSULE ORAL NIGHTLY PRN
Status: DISCONTINUED | OUTPATIENT
Start: 2019-11-06 | End: 2019-11-07 | Stop reason: HOSPADM

## 2019-11-06 RX ORDER — MEPERIDINE HYDROCHLORIDE 25 MG/ML
12.5 INJECTION INTRAMUSCULAR; INTRAVENOUS; SUBCUTANEOUS
Status: DISCONTINUED | OUTPATIENT
Start: 2019-11-06 | End: 2019-11-06 | Stop reason: HOSPADM

## 2019-11-06 RX ORDER — LEVOTHYROXINE SODIUM 0.07 MG/1
75 TABLET ORAL
Status: DISCONTINUED | OUTPATIENT
Start: 2019-11-07 | End: 2019-11-07 | Stop reason: HOSPADM

## 2019-11-06 RX ORDER — ACETAMINOPHEN 325 MG/1
650 TABLET ORAL EVERY 4 HOURS PRN
Status: DISCONTINUED | OUTPATIENT
Start: 2019-11-06 | End: 2019-11-07 | Stop reason: HOSPADM

## 2019-11-06 RX ORDER — FAMOTIDINE 20 MG/1
20 TABLET, FILM COATED ORAL ONCE
Status: DISCONTINUED | OUTPATIENT
Start: 2019-11-06 | End: 2019-11-06 | Stop reason: HOSPADM

## 2019-11-06 RX ORDER — SODIUM CHLORIDE, SODIUM LACTATE, POTASSIUM CHLORIDE, CALCIUM CHLORIDE 600; 310; 30; 20 MG/100ML; MG/100ML; MG/100ML; MG/100ML
9 INJECTION, SOLUTION INTRAVENOUS CONTINUOUS
Status: DISCONTINUED | OUTPATIENT
Start: 2019-11-06 | End: 2019-11-07 | Stop reason: HOSPADM

## 2019-11-06 RX ORDER — GUAIFENESIN 600 MG/1
600 TABLET, EXTENDED RELEASE ORAL 2 TIMES DAILY PRN
Status: DISCONTINUED | OUTPATIENT
Start: 2019-11-06 | End: 2019-11-07 | Stop reason: HOSPADM

## 2019-11-06 RX ORDER — ACETAMINOPHEN 160 MG/5ML
650 SOLUTION ORAL EVERY 4 HOURS PRN
Status: DISCONTINUED | OUTPATIENT
Start: 2019-11-06 | End: 2019-11-07 | Stop reason: HOSPADM

## 2019-11-06 RX ORDER — HYDROCODONE BITARTRATE AND ACETAMINOPHEN 7.5; 325 MG/1; MG/1
1 TABLET ORAL EVERY 6 HOURS PRN
Status: DISCONTINUED | OUTPATIENT
Start: 2019-11-06 | End: 2019-11-07 | Stop reason: HOSPADM

## 2019-11-06 RX ORDER — PROMETHAZINE HYDROCHLORIDE 25 MG/1
25 SUPPOSITORY RECTAL ONCE AS NEEDED
Status: DISCONTINUED | OUTPATIENT
Start: 2019-11-06 | End: 2019-11-06 | Stop reason: HOSPADM

## 2019-11-06 RX ORDER — SODIUM CHLORIDE 0.9 % (FLUSH) 0.9 %
3 SYRINGE (ML) INJECTION EVERY 12 HOURS SCHEDULED
Status: DISCONTINUED | OUTPATIENT
Start: 2019-11-06 | End: 2019-11-06 | Stop reason: HOSPADM

## 2019-11-06 RX ORDER — TAMSULOSIN HYDROCHLORIDE 0.4 MG/1
0.4 CAPSULE ORAL DAILY
Status: DISCONTINUED | OUTPATIENT
Start: 2019-11-07 | End: 2019-11-07 | Stop reason: HOSPADM

## 2019-11-06 RX ORDER — FENTANYL CITRATE 50 UG/ML
INJECTION, SOLUTION INTRAMUSCULAR; INTRAVENOUS AS NEEDED
Status: DISCONTINUED | OUTPATIENT
Start: 2019-11-06 | End: 2019-11-06 | Stop reason: SURG

## 2019-11-06 RX ORDER — IPRATROPIUM BROMIDE AND ALBUTEROL SULFATE 2.5; .5 MG/3ML; MG/3ML
3 SOLUTION RESPIRATORY (INHALATION) ONCE AS NEEDED
Status: DISCONTINUED | OUTPATIENT
Start: 2019-11-06 | End: 2019-11-06 | Stop reason: HOSPADM

## 2019-11-06 RX ORDER — SODIUM CHLORIDE 0.9 % (FLUSH) 0.9 %
10 SYRINGE (ML) INJECTION EVERY 12 HOURS SCHEDULED
Status: DISCONTINUED | OUTPATIENT
Start: 2019-11-06 | End: 2019-11-07 | Stop reason: HOSPADM

## 2019-11-06 RX ORDER — ONDANSETRON 2 MG/ML
4 INJECTION INTRAMUSCULAR; INTRAVENOUS ONCE AS NEEDED
Status: DISCONTINUED | OUTPATIENT
Start: 2019-11-06 | End: 2019-11-06 | Stop reason: HOSPADM

## 2019-11-06 RX ORDER — ACETAMINOPHEN 325 MG/1
650 TABLET ORAL ONCE AS NEEDED
Status: DISCONTINUED | OUTPATIENT
Start: 2019-11-06 | End: 2019-11-06 | Stop reason: HOSPADM

## 2019-11-06 RX ORDER — ROCURONIUM BROMIDE 10 MG/ML
INJECTION, SOLUTION INTRAVENOUS AS NEEDED
Status: DISCONTINUED | OUTPATIENT
Start: 2019-11-06 | End: 2019-11-06 | Stop reason: SURG

## 2019-11-06 RX ORDER — PROPOFOL 10 MG/ML
VIAL (ML) INTRAVENOUS AS NEEDED
Status: DISCONTINUED | OUTPATIENT
Start: 2019-11-06 | End: 2019-11-06 | Stop reason: SURG

## 2019-11-06 RX ORDER — BUDESONIDE AND FORMOTEROL FUMARATE DIHYDRATE 160; 4.5 UG/1; UG/1
2 AEROSOL RESPIRATORY (INHALATION)
Status: DISCONTINUED | OUTPATIENT
Start: 2019-11-06 | End: 2019-11-07 | Stop reason: HOSPADM

## 2019-11-06 RX ADMIN — SODIUM CHLORIDE, PRESERVATIVE FREE 10 ML: 5 INJECTION INTRAVENOUS at 21:38

## 2019-11-06 RX ADMIN — FENTANYL CITRATE 50 MCG: 50 INJECTION, SOLUTION INTRAMUSCULAR; INTRAVENOUS at 15:08

## 2019-11-06 RX ADMIN — DEXAMETHASONE SODIUM PHOSPHATE 8 MG: 4 INJECTION, SOLUTION INTRAMUSCULAR; INTRAVENOUS at 15:22

## 2019-11-06 RX ADMIN — FAMOTIDINE 20 MG: 10 INJECTION, SOLUTION INTRAVENOUS at 14:38

## 2019-11-06 RX ADMIN — BUDESONIDE AND FORMOTEROL FUMARATE DIHYDRATE 2 PUFF: 160; 4.5 AEROSOL RESPIRATORY (INHALATION) at 20:40

## 2019-11-06 RX ADMIN — SODIUM CHLORIDE 10 ML/HR: 9 INJECTION, SOLUTION INTRAVENOUS at 14:40

## 2019-11-06 RX ADMIN — IPRATROPIUM BROMIDE AND ALBUTEROL SULFATE 3 ML: 2.5; .5 SOLUTION RESPIRATORY (INHALATION) at 20:40

## 2019-11-06 RX ADMIN — ROCURONIUM BROMIDE 30 MG: 10 INJECTION INTRAVENOUS at 15:08

## 2019-11-06 RX ADMIN — SODIUM CHLORIDE, POTASSIUM CHLORIDE, SODIUM LACTATE AND CALCIUM CHLORIDE: 600; 310; 30; 20 INJECTION, SOLUTION INTRAVENOUS at 15:04

## 2019-11-06 RX ADMIN — PROPOFOL 200 MG: 10 INJECTION, EMULSION INTRAVENOUS at 15:08

## 2019-11-06 NOTE — ANESTHESIA POSTPROCEDURE EVALUATION
Patient: David Alexis    Procedure Summary     Date:  11/06/19 Room / Location:   [facility] /  [facility]    Anesthesia Start:  1504 Anesthesia Stop:      Procedure:  Bronchoscopy with endobronchial ultrasound and transbronchial biopsy with fluoroscopy  (N/A Bronchus) Diagnosis:      Surgeon:  Arnold You MD Provider:  Jagjit Saha MD    Anesthesia Type:  general ASA Status:  3          Anesthesia Type: general  Last vitals  BP   (!) 182/96 (11/06/19 1432)   Temp   97.4 °F (36.3 °C) (11/06/19 1432)   Pulse   76 (11/06/19 1432)   Resp   13 (11/06/19 1432)     SpO2   98 % (11/06/19 1432)     Post Anesthesia Care and Evaluation    Patient location during evaluation: PACU  Patient participation: complete - patient participated  Level of consciousness: awake and alert  Pain score: 0  Pain management: adequate  Airway patency: patent  Anesthetic complications: No anesthetic complications  PONV Status: none  Cardiovascular status: hemodynamically stable and acceptable  Respiratory status: nonlabored ventilation, acceptable and nasal cannula  Hydration status: acceptable

## 2019-11-06 NOTE — ANESTHESIA PREPROCEDURE EVALUATION
Anesthesia Evaluation                  Airway   Mallampati: I  TM distance: >3 FB  Neck ROM: full  No difficulty expected  Dental      Pulmonary    (+) lung cancer, COPD,   Cardiovascular     ECG reviewed        Neuro/Psych  GI/Hepatic/Renal/Endo    (+)  GERD,  renal disease,     Musculoskeletal     (+) back pain,   Abdominal    Substance History      OB/GYN          Other      history of cancer                    Anesthesia Plan    ASA 3     general     intravenous induction     Anesthetic plan, all risks, benefits, and alternatives have been provided, discussed and informed consent has been obtained with: patient.    Plan discussed with CRNA.

## 2019-11-06 NOTE — OP NOTE
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  Bronchoscope introduced through OETT.  Airways from distal trachea to segmental level were examined.  There was copious amounts of clotted blood mixed with mucous filling the LMSB and RUBIO which was therapeutically suctioned.  This was clearly coming from the RUBIO and I highly suspect this localizes to the apical-posterior segments.  RUL stump appeared intact.  No other anatomic abnormalities noted to segmental level.  Next the EBUS scope was inserted and LN stations 4L, 7, 10L, and 11 L were biopsied several times with the 19 gauge needle.  Finally the regular bronchoscope was inserted and multiple transbronchial biopsies were taken in the RUBIO apical posterior, anterior, and superior lingular segment with fluoro guidance.  BAL was then done in the apical posterior segment with 60 cc in and 20 cc out.   There were no immediate complications.  No active bleeding at conclusion of  procedure.    Impression and Recommendations:    Hemoptysis, localized to RUBIO, specifically apical-posterior, and possibly anterior segments    1) follow up routine chest xray to r/o post procedural pneumothorax  2) consult Dr. Gutierrez for Bronchial Artery Embolization (I already spoke with him, plan on tomorrow, npo after midnight) --> admit for observation  3) pulm consult to follow if patient not discharged as expected tomorrow  4) low threshold for antibiotics guided by culture results from bronchoscopy  5) if bronch pathology negative for malignancy arrange C LN excisional biopsy when recovered from hemoptysis    Arnold You MD  Pulmonology and Critical Care Medicine  11/06/19 4:48 PM  Electronically Signed

## 2019-11-06 NOTE — ANESTHESIA PROCEDURE NOTES
Airway  Urgency: elective    Date/Time: 11/6/2019 3:08 PM  End Time:11/6/2019 3:11 PM  Airway not difficult    General Information and Staff    Patient location during procedure: OR  CRNA: Maulik Westfall CRNA    Indications and Patient Condition  Indications for airway management: airway protection    Preoxygenated: yes  MILS not maintained throughout  Mask difficulty assessment: 1 - vent by mask    Final Airway Details  Final airway type: endotracheal airway      Successful airway: ETT  Cuffed: yes   Successful intubation technique: direct laryngoscopy  Facilitating devices/methods: Bougie (eschmann)  Endotracheal tube insertion site: oral  Blade: Monet  Blade size: 3  ETT size (mm): 9.0  Cormack-Lehane Classification: grade I - full view of glottis  Placement verified by: chest auscultation and capnometry   Measured from: lips  ETT/EBT  to lips (cm): 22  Number of attempts at approach: 1    Additional Comments  Negative epigastric sounds, Breath sound equal bilaterally with symmetric chest rise and fall

## 2019-11-07 VITALS
OXYGEN SATURATION: 94 % | HEIGHT: 66 IN | BODY MASS INDEX: 26.39 KG/M2 | DIASTOLIC BLOOD PRESSURE: 84 MMHG | RESPIRATION RATE: 18 BRPM | TEMPERATURE: 98.4 F | WEIGHT: 164.2 LBS | SYSTOLIC BLOOD PRESSURE: 159 MMHG | HEART RATE: 77 BPM

## 2019-11-07 LAB
DEPRECATED RDW RBC AUTO: 42.2 FL (ref 37–54)
ERYTHROCYTE [DISTWIDTH] IN BLOOD BY AUTOMATED COUNT: 12.8 % (ref 12.3–15.4)
HCT VFR BLD AUTO: 42.4 % (ref 37.5–51)
HGB BLD-MCNC: 13.9 G/DL (ref 13–17.7)
MCH RBC QN AUTO: 29.5 PG (ref 26.6–33)
MCHC RBC AUTO-ENTMCNC: 32.8 G/DL (ref 31.5–35.7)
MCV RBC AUTO: 90 FL (ref 79–97)
PLATELET # BLD AUTO: 219 10*3/MM3 (ref 140–450)
PMV BLD AUTO: 9.8 FL (ref 6–12)
RBC # BLD AUTO: 4.71 10*6/MM3 (ref 4.14–5.8)
WBC NRBC COR # BLD: 8.82 10*3/MM3 (ref 3.4–10.8)

## 2019-11-07 PROCEDURE — C1894 INTRO/SHEATH, NON-LASER: HCPCS | Performed by: RADIOLOGY

## 2019-11-07 PROCEDURE — C1769 GUIDE WIRE: HCPCS | Performed by: RADIOLOGY

## 2019-11-07 PROCEDURE — 63710000001 TAMSULOSIN 0.4 MG CAPSULE: Performed by: INTERNAL MEDICINE

## 2019-11-07 PROCEDURE — C1887 CATHETER, GUIDING: HCPCS | Performed by: RADIOLOGY

## 2019-11-07 PROCEDURE — G0378 HOSPITAL OBSERVATION PER HR: HCPCS

## 2019-11-07 PROCEDURE — 36216 PLACE CATHETER IN ARTERY: CPT | Performed by: RADIOLOGY

## 2019-11-07 PROCEDURE — 25010000002 FENTANYL CITRATE (PF) 100 MCG/2ML SOLUTION: Performed by: RADIOLOGY

## 2019-11-07 PROCEDURE — 37244 VASC EMBOLIZE/OCCLUDE BLEED: CPT | Performed by: RADIOLOGY

## 2019-11-07 PROCEDURE — A9270 NON-COVERED ITEM OR SERVICE: HCPCS | Performed by: INTERNAL MEDICINE

## 2019-11-07 PROCEDURE — 0 IODIXANOL PER 1 ML: Performed by: RADIOLOGY

## 2019-11-07 PROCEDURE — 36215 PLACE CATHETER IN ARTERY: CPT | Performed by: RADIOLOGY

## 2019-11-07 PROCEDURE — 99232 SBSQ HOSP IP/OBS MODERATE 35: CPT | Performed by: INTERNAL MEDICINE

## 2019-11-07 PROCEDURE — 75743 ARTERY X-RAYS LUNGS: CPT | Performed by: RADIOLOGY

## 2019-11-07 PROCEDURE — C1760 CLOSURE DEV, VASC: HCPCS | Performed by: RADIOLOGY

## 2019-11-07 PROCEDURE — 99217 PR OBSERVATION CARE DISCHARGE MANAGEMENT: CPT | Performed by: FAMILY MEDICINE

## 2019-11-07 PROCEDURE — 63710000001 CETIRIZINE 10 MG TABLET: Performed by: INTERNAL MEDICINE

## 2019-11-07 PROCEDURE — 85027 COMPLETE CBC AUTOMATED: CPT | Performed by: FAMILY MEDICINE

## 2019-11-07 PROCEDURE — 94799 UNLISTED PULMONARY SVC/PX: CPT

## 2019-11-07 PROCEDURE — 25010000002 MIDAZOLAM PER 1 MG: Performed by: RADIOLOGY

## 2019-11-07 PROCEDURE — 75726 ARTERY X-RAYS ABDOMEN: CPT | Performed by: RADIOLOGY

## 2019-11-07 PROCEDURE — 63710000001 PANTOPRAZOLE 40 MG TABLET DELAYED-RELEASE: Performed by: INTERNAL MEDICINE

## 2019-11-07 PROCEDURE — 63710000001 FLUTICASONE 50 MCG/ACT SUSPENSION 16 G BOTTLE: Performed by: INTERNAL MEDICINE

## 2019-11-07 DEVICE — 360 SOFT DETACHABLE COIL
Type: IMPLANTABLE DEVICE | Status: FUNCTIONAL
Brand: TARGET XL

## 2019-11-07 DEVICE — PARTICL EMB CONTRL PVA 250/355MH BX/5: Type: IMPLANTABLE DEVICE | Status: FUNCTIONAL

## 2019-11-07 RX ORDER — FLUTICASONE PROPIONATE 50 MCG
2 SPRAY, SUSPENSION (ML) NASAL DAILY
Qty: 9.9 ML | Refills: 0
Start: 2019-11-08 | End: 2019-11-12

## 2019-11-07 RX ORDER — MIDAZOLAM HYDROCHLORIDE 1 MG/ML
INJECTION INTRAMUSCULAR; INTRAVENOUS AS NEEDED
Status: DISCONTINUED | OUTPATIENT
Start: 2019-11-07 | End: 2019-11-07 | Stop reason: HOSPADM

## 2019-11-07 RX ORDER — LIDOCAINE HYDROCHLORIDE 10 MG/ML
INJECTION, SOLUTION EPIDURAL; INFILTRATION; INTRACAUDAL; PERINEURAL AS NEEDED
Status: DISCONTINUED | OUTPATIENT
Start: 2019-11-07 | End: 2019-11-07 | Stop reason: HOSPADM

## 2019-11-07 RX ORDER — FENTANYL CITRATE 50 UG/ML
INJECTION, SOLUTION INTRAMUSCULAR; INTRAVENOUS AS NEEDED
Status: DISCONTINUED | OUTPATIENT
Start: 2019-11-07 | End: 2019-11-07 | Stop reason: HOSPADM

## 2019-11-07 RX ORDER — SODIUM CHLORIDE 9 MG/ML
75 INJECTION, SOLUTION INTRAVENOUS CONTINUOUS
Status: ACTIVE | OUTPATIENT
Start: 2019-11-07 | End: 2019-11-07

## 2019-11-07 RX ORDER — IODIXANOL 320 MG/ML
INJECTION, SOLUTION INTRAVASCULAR AS NEEDED
Status: DISCONTINUED | OUTPATIENT
Start: 2019-11-07 | End: 2019-11-07 | Stop reason: HOSPADM

## 2019-11-07 RX ORDER — SODIUM CHLORIDE 0.9 % (FLUSH) 0.9 %
10 SYRINGE (ML) INJECTION AS NEEDED
Status: DISCONTINUED | OUTPATIENT
Start: 2019-11-07 | End: 2019-11-07 | Stop reason: HOSPADM

## 2019-11-07 RX ORDER — SODIUM CHLORIDE 0.9 % (FLUSH) 0.9 %
3 SYRINGE (ML) INJECTION EVERY 12 HOURS SCHEDULED
Status: DISCONTINUED | OUTPATIENT
Start: 2019-11-07 | End: 2019-11-07 | Stop reason: HOSPADM

## 2019-11-07 RX ADMIN — CETIRIZINE HYDROCHLORIDE 5 MG: 10 TABLET, FILM COATED ORAL at 08:55

## 2019-11-07 RX ADMIN — BUDESONIDE AND FORMOTEROL FUMARATE DIHYDRATE 2 PUFF: 160; 4.5 AEROSOL RESPIRATORY (INHALATION) at 07:59

## 2019-11-07 RX ADMIN — FLUTICASONE PROPIONATE 2 SPRAY: 50 SPRAY, METERED NASAL at 08:57

## 2019-11-07 RX ADMIN — PANTOPRAZOLE SODIUM 40 MG: 40 TABLET, DELAYED RELEASE ORAL at 08:54

## 2019-11-07 RX ADMIN — TAMSULOSIN HYDROCHLORIDE 0.4 MG: 0.4 CAPSULE ORAL at 08:54

## 2019-11-07 RX ADMIN — IPRATROPIUM BROMIDE AND ALBUTEROL SULFATE 3 ML: 2.5; .5 SOLUTION RESPIRATORY (INHALATION) at 07:59

## 2019-11-07 NOTE — PLAN OF CARE
Problem: Patient Care Overview  Goal: Plan of Care Review  Outcome: Ongoing (interventions implemented as appropriate)   11/07/19 5833   Coping/Psychosocial   Plan of Care Reviewed With patient   Plan of Care Review   Progress no change   OTHER   Outcome Summary Pt's VSS on room air. Once comfortable, slept through the night with no issues. Will continue to monitor.

## 2019-11-07 NOTE — BRIEF OP NOTE
"COIL EMBOLIZATION  Progress Note    David Justice  11/7/2019    Pre-op Diagnosis:   1.  Recurrent hemoptysis, left upper lobe  2.  History of lung cancer       Post-Op Diagnosis Codes:  1.  Recurrent hemoptysis, left upper lobe  2.  History of lung cancer    Procedure/CPT® Codes:      Procedure(s):  Bronchial artery embolization    Surgeon(s):  Thomas Gutierrez MD    Anesthesia: * No anesthesia type entered *    Staff:   Scrub Person: Jannie Anderson  Documenter: Alisa Mcneil  Invasive Nurse: Candice Dye RN    Estimated Blood Loss: minimal    Urine Voided: * No values recorded between 11/7/2019 11:09 AM and 11/7/2019 11:57 AM *    Specimens:                None          Drains:      Findings: No active contrast extravasation is identified; however, lateral bronchial artery angiograms do demonstrate hypertrophied bronchial arteries with ectasia, consistent with history of hemoptysis.  The patient has had recent bronchoscopy, confirming a left upper lobe \"culprit\" for hemoptysis.  The left upper bronchial artery was embolized with PVA particles (250-350 µm), along with placement of a 2 mm platinum coil, resulting in occlusion of the left upper lobe bronchial artery.    Complications: None apparent    Recommendations: The patient will need to recover for 2-3 hours following his angiogram, but would be okay to discharge home later today from an interventional radiology standpoint.      Thomas Gutierrez MD     Date: 11/7/2019  Time: 12:00 PM      "

## 2019-11-07 NOTE — PROGRESS NOTES
Discharge Planning Assessment  [facility]     Patient Name: David Alexis  MRN: [id number]  Today's Date: 11/7/2019    Admit Date: 11/6/2019    Discharge Needs Assessment     Row Name 11/07/19 0943       Living Environment    Lives With  spouse    Current Living Arrangements  home/apartment/condo    Primary Care Provided by  self    Able to Return to Prior Arrangements  yes       Transition Planning    Patient/Family Anticipates Transition to  home    Transportation Anticipated  family or friend will provide       Discharge Needs Assessment    Readmission Within the Last 30 Days  no previous admission in last 30 days    Concerns to be Addressed  denies needs/concerns at this time    Equipment Currently Used at Home  none    Anticipated Changes Related to Illness  none    Equipment Needed After Discharge  none    Current Discharge Risk  chronically ill        Discharge Plan     Row Name 11/07/19 0944       Plan    Plan  home    Patient/Family in Agreement with Plan  yes    Plan Comments  I met with Mr. Alexis at the bedside. He and his wife live in [place]. He is independent with activities of daily living. He does not use any DME at home and reports that he and his wife are very active. He plans on returning home after his procedure today. His wife will transport him by car. He does not anticipate having any discharge needs.    Final Discharge Disposition Code  01 - home or self-care        Destination      No service coordination in this encounter.      Durable Medical Equipment      No service coordination in this encounter.      Dialysis/Infusion      No service coordination in this encounter.      Home Medical Care      No service coordination in this encounter.      Therapy      No service coordination in this encounter.      Community Resources      No service coordination in this encounter.          Demographic Summary     Row Name 11/07/19 0942       General Information    General Information Comments   I confirmed that Michael Schreiber is Mr. Alexis's PCP. Medicare is his primary insurer and he does have RX coverage.        Functional Status     Row Name 11/07/19 0943       Functional Status    Usual Activity Tolerance  good       Functional Status, IADL    Medications  independent    Meal Preparation  independent    Housekeeping  independent    Laundry  independent    Shopping  independent       Employment/    Employment Status  disabled retired         Psychosocial    No documentation.       Abuse/Neglect    No documentation.       Legal    No documentation.       Substance Abuse    No documentation.       Patient Forms    No documentation.           Amador Magallanes RN

## 2019-11-07 NOTE — DISCHARGE SUMMARY
[facility] Medicine Services  DISCHARGE SUMMARY    Patient Name: David Alexis  : 1955  MRN: [id number]    Date of Admission: 2019  Date of Discharge:  19  Primary Care Physician: Michael Schreiber MD    Consults     Date and Time Order Name Status Description    2019 0030 Inpatient Pulmonology Consult            Hospital Course     Presenting Problem:   Localized enlarged lymph nodes [R59.0]  Hemoptysis [R04.2]  Hemoptysis [R04.2]    Active Hospital Problems    Diagnosis  POA   • **Hemoptysis [R04.2]  Yes   • GERD without esophagitis [K21.9]  Yes   • Hypothyroidism (acquired) [E03.9]  Yes   • COPD (chronic obstructive pulmonary disease), Moderate [J44.9]  Yes   • CWP (coalworkers pneumoconiosis) (CMS/[facility]) [J60]  Yes   • Presumed Sarcoidosis, Non-Caseating Granuloma on mediastinoscopy [D86.9]  Yes   • Malignant neoplasm of lower lobe of left lung, AdenoCA by CT FNA 2016 [C34.32]  Yes      Resolved Hospital Problems   No resolved problems to display.          Hospital Course:  David Alexis is a 64 y.o. male with a past medical history significant for COPD, Coal Workers pneumoconiosis, complicated lung cancer history, hypothyroidism, and GERD who presented s/p bronchoscopy per Dr. Arnold You for evaluation of hemoptysis. Has been going on intermittently for the past month. Worse at night. characterized as bright red with some clear sputum at times. Usually no more than table spoon at at a time. There is associated intermittent, productive cough. No dyspnea, chest pain, syncope, fever, unintentional weight loss, or loss of appetite. He did just undergo PET scan 10/25/19 which demonstrates stable changes consistent with CWP but also noted new extensive reticular and GG infiltrate in the RUBIO and enlarging metabolically active right supraclavicular lymph node that was new from PET 6/10/19. Patient will undergo evaluated per Dr. Gutierrez and possible bronchial artery  embolization.  Patient was admitted to the medical floor.  H&H reviewed and stable.  Patient underwent bronchial artery embolization by  Given this a.m.  Patient tolerated procedure well.  No complications.  Patient will need to recover 2 -3 hours following the angiogram but okay for discharge home later today from an IR standpoint.  Patient need to follow-up with pulmonology in 1 week.  Patient will be discharged home in improved and stable condition.      Discharge Follow Up Recommendations for labs/diagnostics:  Follow-up with PCP in 3-5 days  Follow-up with Dr. Arnold You in 1 week    Day of Discharge     HPI:   Patient seen and examined.  Nursing notes reviewed.  No acute events overnight.  Patient states that he had a very minimal episode of hemoptysis this morning.  Patient denies chest pain.  He states that shortness of breath is at his baseline.    Review of Systems  Gen- No fevers, chills  CV- No chest pain, palpitations  Resp- No cough, +dyspnea (at baseline)  GI- No N/V/D, abd pain      Vital Signs:   Temp:  [97.4 °F (36.3 °C)-98.6 °F (37 °C)] 98.4 °F (36.9 °C)  Heart Rate:  [] 80  Resp:  [13-20] 18  BP: (113-182)/(70-96) 173/73     Physical Exam:  Constitutional: Awake, alert, NAD  Eyes: PERRLA, sclerae anicteric, no conjunctival injection  HENT: NCAT, mucous membranes moist  Neck: Supple, no thyromegaly, no lymphadenopathy, trachea midline  Respiratory: Clear to auscultation bilaterally, nonlabored respirations   Cardiovascular: RRR, no murmurs, rubs, or gallops, palpable pedal pulses bilaterally  Gastrointestinal: Positive bowel sounds, soft, nontender, nondistended  Musculoskeletal: No bilateral ankle edema, no clubbing or cyanosis to extremities  Psychiatric: Appropriate affect, cooperative  Neurologic: Oriented x 3, strength symmetric in all extremities, Cranial Nerves grossly intact to confrontation, speech clear  Skin: No rashes    Pertinent  and/or Most Recent Results     Results  from last 7 days   Lab Units 11/07/19  0820 11/05/19  1308   WBC 10*3/mm3 8.82 5.77   HEMOGLOBIN g/dL 13.9 13.9   HEMATOCRIT % 42.4 41.9   PLATELETS 10*3/mm3 219 211   SODIUM mmol/L  --  143   POTASSIUM mmol/L  --  3.7   CHLORIDE mmol/L  --  102   CO2 mmol/L  --  30.0*   BUN mg/dL  --  9   CREATININE mg/dL  --  0.81   GLUCOSE mg/dL  --  105*   CALCIUM mg/dL  --  8.7     Results from last 7 days   Lab Units 11/05/19  1308   BILIRUBIN mg/dL 0.3   ALK PHOS U/L 93   ALT (SGPT) U/L 12   AST (SGOT) U/L 22   PROTIME Seconds 12.8   INR  1.01   APTT seconds 29.8           Invalid input(s): TG, LDLCALC, LDLREALC        Brief Urine Lab Results     None          Microbiology Results Abnormal     Procedure Component Value - Date/Time    Respiratory Culture - Lavage, Lung, Left Upper Lobe [[id number]] Collected:  11/06/19 1608    Lab Status:  Preliminary result Specimen:  Lavage from Lung, Left Upper Lobe Updated:  11/07/19 1253     Respiratory Culture No growth     Gram Stain Few (2+) WBCs per low power field      No organisms seen          Imaging Results (All)     Procedure Component Value Units Date/Time    XR Chest 1 View [[id number]] Collected:  11/07/19 0912     Updated:  11/07/19 1121    Narrative:       EXAMINATION: XR CHEST 1 VW-      INDICATION: Postop bronch; R04.2-Hemoptysis.      COMPARISON: Chest radiograph 08/26/2016.     FINDINGS: Single portable chest radiograph was submitted for review. The  heart is not enlarged. Left lower lobe postsurgical change is  identified. Hazy left upper lobe airspace changes with postsurgical  suture identified. No postprocedural pneumothorax identified.           Impression:       Left upper lobe postsurgical changes in airspace disease  without evidence of postprocedural pneumothorax.     D:  11/07/2019  E:  11/07/2019             FL C Arm During Surgery [[id number]] Updated:  11/06/19 1642                          Order Current Status    AFB Culture - Lavage, Lung, Left Upper Lobe In  process    Fungus Culture - Lavage, Lung, Left Upper Lobe In process    Non-gynecologic Cytology In process    Tissue Pathology Exam In process    Respiratory Culture - Lavage, Lung, Left Upper Lobe Preliminary result        Discharge Details        Discharge Medications      Continue These Medications      Instructions Start Date   albuterol sulfate  (90 Base) MCG/ACT inhaler  Commonly known as:  PROVENTIL HFA;VENTOLIN HFA;PROAIR HFA   2 puffs, Inhalation, Every 4 Hours PRN      fluticasone 50 MCG/ACT nasal spray  Commonly known as:  FLONASE   2 sprays, Nasal, Daily   Start Date:  11/8/2019     guaiFENesin 600 MG 12 hr tablet  Commonly known as:  MUCINEX   600 mg, Oral, 2 Times Daily      HYDROcodone-acetaminophen 7.5-325 MG per tablet  Commonly known as:  NORCO   1 tablet, Oral, Every 6 Hours PRN      levocetirizine 5 MG tablet  Commonly known as:  XYZAL   5 mg, Oral, Every Evening      levothyroxine 75 MCG tablet  Commonly known as:  SYNTHROID, LEVOTHROID   75 mcg, Oral, Daily      omeprazole 40 MG capsule  Commonly known as:  priLOSEC   40 mg, Oral, Daily      SYMBICORT 160-4.5 MCG/ACT inhaler  Generic drug:  budesonide-formoterol   INL 2 PUFFS PO BID      tamsulosin 0.4 MG capsule 24 hr capsule  Commonly known as:  FLOMAX   1 capsule, Oral, Daily             No Known Allergies      Discharge Disposition:  Home or Self Care    Diet:  Hospital:  Diet Order   Procedures   • Diet Regular; Cardiac     Discharge:   Diet Instructions     Diet: Regular      Discharge Diet:  Regular          Discharge Activity:   Activity Instructions     Activity as Tolerated              CODE STATUS:    Code Status and Medical Interventions:   Ordered at: 11/06/19 1938     Level Of Support Discussed With:    Patient     Code Status:    CPR     Medical Interventions (Level of Support Prior to Arrest):    Full         Future Appointments   Date Time Provider Department Center   12/9/2019 11:30 AM Snehal Stallworth APRN [facility]   2/17/2020  1:30 PM [facility] LAB 1 [facility] None   2/17/2020  2:00 PM Arnold You MD [facility] None   4/24/2020  1:00 PM Snehal Stallworth APRN [facility]       Additional Instructions for the Follow-ups that You Need to Schedule     Discharge Follow-up with PCP   As directed       Currently Documented PCP:    Michael Schreiber MD    PCP Phone Number:    [phone]     Follow Up Details:  3-5 days         Discharge Follow-up with Specified Provider: Dr. You, Pulmonology; 1 Week   As directed      To:  Dr. You, Pulmonology    Follow Up:  1 Week               Time Spent on Discharge:  45 minutes, 25 minutes spent with patient and counseling, remaining time spent coordinating care.    Electronically signed by Gabrielle Otero DO, 11/07/19, 1:15 PM.

## 2019-11-07 NOTE — PROGRESS NOTES
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  Patient was diagnosed with RUL NSCLC and had a RUL Lobectomy in 2009, presumably early stage - did not require chemo / RT.  He then had a new nodule in 2012 and had a RUBIO wedge resection that showed CWP.  In 2016 patient had a new LLL nodule with CT guided biopsy + for NSCLC and he underwent SBRT.  At this same time he had EBUS of his mediastinal LN's showing anthracotic pigment and non-necrotizing granulomas.  Had been told he had sarcoid after original lung cancer surgery in 2009 (those path reports are at [facility]).  He was never treated with steroids, and never had any evaluation to look for extra-pulmonary sarcoidosis.     He comes in today for 1 month of hemoptysis.  Coughing up a spoonful at a time, several times a day.  No trouble breathing - able to go hiking several miles.  No fever, chills, nightsweats, weight loss, etc.  He just a PET done 10/25/19 that shows stable nodules and mediastinal lymph nodes (c/w scar and CWP) but a new extensive reticular and GG infiltrate in the RUBIO.  There was also an enlarging metabolically active right supraclavicular lymph node that was new from PET 6/10/19.      (End of copied text).     Patient underwent bronchoscopy by Dr. Arnold You on 11/6/2019 with endobronchial ultrasound-guided biopsies  of stations 4L, 7, 10 L and 11 L.  He also had multiple transbronchial biopsies in the left upper lobe.  Patient had significant evidence of blood clots and was admitted for additional observation and plan for embolization of the left upper lobe bronchial artery.    Interval History:   Patient had one episode of bilious scant hemoptysis this morning which he reports is pretty much baseline.  No fevers or chills.  No nausea or vomiting.  No dyspnea.  Plan for angiography and embolization today by Dr. Gutierrez.    History taken from: Patient    PMH/FH/Social History were reviewed and updated appropriately in the electronic medical record.     Review of Systems:    Review of 14 systems was completed with positives and pertinent negatives noted in the subjective section.  All other systems reviewed and are negative.         Objective     Vital Signs  Temp:  [97.4 °F (36.3 °C)-98.6 °F (37 °C)] 98.4 °F (36.9 °C)  Heart Rate:  [] 77  Resp:  [13-20] 18  BP: (113-182)/(70-96) 136/74  11/06 0701 - 11/07 0700  In: 1280 [P.O.:480; I.V.:800]  Out: 250 [Urine:250]  Body mass index is 26.5 kg/m².     Physical Exam:     Constitutional:   Alert, cooperative, in no acute distress   Head:   Normocephalic, without obvious abnormality, atraumatic   Eyes:           Lids and lashes normal, conjunctivae and sclerae normal.  No icterus, no pallor, corneas clear, PER   ENMT:  Ears appear intact with no abnormalities noted     No oral lesions, no thrush, oral mucosa moist   Neck:  No adenopathy, supple, trachea midline, no thyromegaly, no JVD   Lungs/Resp:    Normal effort, symmetric chest rise, no crepitus, minimal scattered rales bilaterally, no chest wall tenderness                Heart/CV:   Regular rhythm and normal rate, normal S1 and S2, no            murmur   Abdomen/GI:    Normal bowel sounds, no masses, no organomegaly, soft        non-tender, non-distended   :    Deferred   Extremities/MSK:  No clubbing or cyanosis.  No edema.   Normal tone.    No deformities.    Pulses:  Pulses palpable and equal bilaterally   Skin:  No bleeding, bruising or rash   Heme/Lymph:  No cervical or supraclavicular adenopathy.   Neurologic:    Psychiatric:    Moves all extremities with no obvious focal motor deficit.  Cranial nerves 2 - 12 grossly intact  Oriented x 3, normal affect.      Results Review:     I reviewed the patient's new clinical results.   Results from last 7 days   Lab Units 11/05/19  1308   SODIUM mmol/L 143   POTASSIUM mmol/L 3.7   CHLORIDE mmol/L 102   CO2 mmol/L 30.0*   BUN mg/dL 9   CREATININE mg/dL 0.81   CALCIUM mg/dL 8.7   BILIRUBIN mg/dL 0.3   ALK PHOS U/L 93   ALT (SGPT) U/L 12   AST (SGOT) U/L 22   GLUCOSE mg/dL 105*     Results from last 7 days   Lab Units 11/07/19  0820 11/05/19  1308   WBC 10*3/mm3 8.82 5.77   HEMOGLOBIN g/dL 13.9 13.9   HEMATOCRIT % 42.4 41.9   PLATELETS 10*3/mm3 219 211               I reviewed the patient's new imaging including images and reports.    Medication Review:     budesonide-formoterol 2 puff Inhalation BID - RT   cetirizine 5 mg Oral Daily   fluticasone 2 spray Nasal Daily   ipratropium-albuterol 3 mL Nebulization 4x Daily - RT   levothyroxine 75 mcg Oral Q AM   pantoprazole 40 mg Oral QAM   sodium chloride 10 mL Intravenous Q12H   sodium chloride 3 mL Intravenous Q12H   tamsulosin 0.4 mg Oral Daily       lactated ringers 9 mL/hr Last Rate: Stopped (11/06/19 1912)   sodium chloride 10 mL/hr Last Rate: Stopped (11/06/19 1912)   sodium chloride 75 mL/hr Last Rate: 75 mL/hr (11/07/19 1231)       Assessment/Plan       Hemoptysis    Malignant neoplasm of lower lobe of left lung, AdenoCA by CT FNA 2016    COPD (chronic obstructive pulmonary disease), Moderate    Presumed Sarcoidosis, Non-Caseating Granuloma on mediastinoscopy    CWP (coalworkers pneumoconiosis) (CMS/HCC)    GERD without esophagitis    Hypothyroidism (acquired)    64 y.o. with history of lung cancer and prior treatment with surgery and  radiation as documented in the HPI.  Patient underwent bronchoscopy yesterday and had evidence of significant bleeding with blood clots in the left mainstem bronchus and left upper lobe.  He was admitted for further observation post bronchoscopy and plan for embolization of the bronchial artery today.    Plan:  1.  Continue monitoring for hemoptysis.  2.  Plan for angiography/embolization today.  3.  Hopefully can be discharged home later on today if stable postprocedure.  4.  We will need to follow-up with Dr. Arnold You.  5.  Follow-up pathology/cytology/cultures.  6.  May eventually need lymph node biopsy.      Kaleb Tolliver MD  11/07/19  1:30 PM      *. Please note that portions of this note were completed with a voice recognition program. Efforts were made to edit the dictations, but occasionally words are mistranscribed.

## 2019-11-08 ENCOUNTER — TELEPHONE (OUTPATIENT)
Dept: PULMONOLOGY | Facility: CLINIC | Age: 64
End: 2019-11-08

## 2019-11-08 LAB
BACTERIA SPEC RESP CULT: NORMAL
CYTO UR: NORMAL
GRAM STN SPEC: NORMAL
GRAM STN SPEC: NORMAL
LAB AP CASE REPORT: NORMAL
LAB AP CLINICAL INFORMATION: NORMAL
PATH REPORT.FINAL DX SPEC: NORMAL
PATH REPORT.GROSS SPEC: NORMAL

## 2019-11-11 ENCOUNTER — OFFICE VISIT (OUTPATIENT)
Dept: PULMONOLOGY | Facility: CLINIC | Age: 64
End: 2019-11-11

## 2019-11-11 VITALS
TEMPERATURE: 98.9 F | HEIGHT: 66 IN | SYSTOLIC BLOOD PRESSURE: 142 MMHG | HEART RATE: 85 BPM | WEIGHT: 167.4 LBS | DIASTOLIC BLOOD PRESSURE: 82 MMHG | BODY MASS INDEX: 26.9 KG/M2 | OXYGEN SATURATION: 95 %

## 2019-11-11 DIAGNOSIS — C34.92 NON-SMALL CELL CANCER OF LEFT LUNG (HCC): Primary | ICD-10-CM

## 2019-11-11 LAB
LAB AP CASE REPORT: NORMAL
PATH REPORT.FINAL DX SPEC: NORMAL

## 2019-11-11 PROCEDURE — 99213 OFFICE O/P EST LOW 20 MIN: CPT | Performed by: INTERNAL MEDICINE

## 2019-11-11 NOTE — PROGRESS NOTES
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  Patient was diagnosed with RUL NSCLC and had a RUL Lobectomy in 2009, presumably early stage - did not require chemo / RT.  He then had a new nodule in 2012 and had a RUBIO wedge resection that showed CWP.  In 2016 patient had a new LLL nodule with CT guided biopsy + for NSCLC and he underwent SBRT.  At this same time he had EBUS of his mediastinal LN's showing anthracotic pigment and non-necrotizing granulomas.  Had been told he had sarcoid after original lung cancer surgery in 2009 (those path reports are at [facility]).  He was never treated with steroids, and never had any evaluation to look for extra-pulmonary sarcoidosis.    He comes in today for 1 month of hemoptysis.  Coughing up a spoonful at a time, several times a day.  No trouble breathing - able to go hiking several miles.  No fever, chills, nightsweats, weight loss, etc.  He just a PET done 10/25/19 that shows stable nodules and mediastinal lymph nodes (c/w scar and CWP) but a new extensive reticular and GG infiltrate in the RUBIO.  There was also an enlarging metabolically active right supraclavicular lymph node that was new from PET 6/10/19.     INTERVAL HISTORY:    Patient returns today for follow up.  Had bronch w/ TBBx and EBUS on 11/6.  Tissue path in RUBIO c/w coal workers pneumoconiosis,  However 4L LN was + for NSCLC favor adeno, and the BAL in RUBIO was suspicious (\"a rare malignant cell is seen in background of bronchial cells and macrophages\").  He was found to have blood clots coming from the RUBIO during bronch with no endobronchial lesion.  He underwent Bronchial Artery Embolization of the RUBIO with Dr. Gutierrez on 11/7 and was discharged that day.  Still " coughing up some blood.  May have slowed down some but still definitely present.  Otherwise no new complaints.     PMH:    Past Medical History:   Diagnosis Date   • Back pain    • Black lung (CMS/HCC)    • COPD (chronic obstructive pulmonary disease) (CMS/HCC)    • GERD (gastroesophageal reflux disease)    • Hearing loss     WEARS AIDS   • Hypothyroidism    • Kidney stone    • Lung cancer (CMS/HCC)    • Sarcoidosis    • Wears glasses     READING     PSH:    Past Surgical History:   Procedure Laterality Date   • BRONCHOSCOPY N/A 8/26/2016    Procedure: CHASITY BRONCHOSCOPY WITH PLACEMENT OF FIDUCIALS, EBUS WITH FLOURO;  Surgeon: Tommy You MD;  Location:  [facility];  Service:    • BRONCHOSCOPY  11/06/2019   • BRONCHOSCOPY N/A 11/6/2019    Procedure: Bronchoscopy with endobronchial ultrasound and transbronchial biopsy with fluoroscopy ;  Surgeon: Arnold You MD;  Location:  [facility];  Service: Pulmonary   • COLONOSCOPY  12/18/2007    DID COLOGAURD 2018   • INGUINAL HERNIA REPAIR Bilateral    • INTERVENTIONAL RADIOLOGY PROCEDURE N/A 11/7/2019    Procedure: COIL EMBOLIZATION;  Surgeon: Thomas Gutierrez MD;  Location:  [facility];  Service: Interventional Radiology   • LUNG BIOPSY Left 2016   • LUNG LOBECTOMY Right 2009    RIGHT UPPER LOBECTOMY   • LYMPH NODE BIOPSY      NECK   • THORACOTOMY Left     LEFT THORACOTOMY WITH WEDGE RESECTION     FH:    Family History   Problem Relation Age of Onset   • Colon cancer Mother    • Other Father    • Lung cancer Brother      SH:    Social History     Socioeconomic History   • Marital status:      Spouse name: Not on file   • Number of children: 0   • Years of education: Not on file   • Highest education level: Not on file   Occupational History     Employer: DISABLED   Tobacco Use   • Smoking status: Former Smoker     Packs/day: 1.50     Years: 25.00     Pack years: 37.50     Types: Cigarettes     Last attempt to  quit: 2006     Years since quittin.3   • Smokeless tobacco: Never Used   Substance and Sexual Activity   • Alcohol use: No   • Drug use: No   • Sexual activity: Yes     Partners: Female     Comment:     Social History Narrative        Previously worked in the [employer].    Smoked 1-1/2ppd for 25+ years, none since     No ETOh or illicit drugs     ALLERGIES:    No Known Allergies  MEDICATIONS:      Current Outpatient Medications:   •  albuterol (PROVENTIL HFA;VENTOLIN HFA) 108 (90 BASE) MCG/ACT inhaler, Inhale 2 puffs every 4 (four) hours as needed for wheezing or shortness of air., Disp: , Rfl:   •  fluticasone (FLONASE) 50 MCG/ACT nasal spray, 2 sprays into the nostril(s) as directed by provider Daily., Disp: 9.9 mL, Rfl: 0  •  guaiFENesin (MUCINEX) 600 MG 12 hr tablet, Take 1 tablet by mouth 2 (Two) Times a Day., Disp: 60 tablet, Rfl: 5  •  HYDROcodone-acetaminophen (NORCO) 7.5-325 MG per tablet, Take 1 tablet by mouth every 6 (six) hours as needed for moderate pain (4-6)., Disp: , Rfl:   •  levocetirizine (XYZAL) 5 MG tablet, Take 5 mg by mouth Every Evening., Disp: , Rfl:   •  levothyroxine (SYNTHROID, LEVOTHROID) 75 MCG tablet, Take 75 mcg by mouth Daily., Disp: , Rfl:   •  omeprazole (priLOSEC) 40 MG capsule, Take 40 mg by mouth Daily., Disp: , Rfl:   •  SYMBICORT 160-4.5 MCG/ACT inhaler, INL 2 PUFFS PO BID, Disp: , Rfl: 3  •  tamsulosin (FLOMAX) 0.4 MG capsule 24 hr capsule, Take 1 capsule by mouth Daily., Disp: 30 capsule, Rfl: 6  ROS:  Per HPI, otherwise all systems reviewed and negative.    DIAGNOSTIC DATA (Reviewed and interpreted by me unless otherwise specified):    PET CT 10/25/19 - extensive reticular and GG infiltrate in RUBIO, other areas of CWP / scar stable from prior PET 6/10/19 with exception of right SC LN    Vitals:    19 1352   BP: 142/82   Pulse: 85   Temp: 98.9 °F (37.2 °C)   SpO2: 95%       Physical Exam   Constitutional: Oriented to person, place,  and time. Appears well-developed and well-nourished.   Head: Normocephalic and atraumatic.   Nose: Nose normal.   Mouth/Throat: Oropharynx is clear and moist.   Eyes: Conjunctivae are normal.  Pupils normal.  Neck: No tracheal deviation present.   Cardiovascular: Normal rate, regular rhythm, normal heart sounds and intact distal pulses.  Exam reveals no gallop and no friction rub.  No thrill.  No JVD.  No edema.  No murmur heard.  Pulmonary/Chest: Effort normal and breath sounds normal.  No tenderness to palpation.  No clubbing.   Abdominal: Soft. Bowel sounds are normal. No distension. No tenderness. There is no guarding.   Musculoskeletal: Normal range of motion.  No tenderness.  Lymphadenopathy:  No cervical adenopathy.   Neurological:  No new focal neurological deficits observed   Skin: Skin is warm and dry. No rash noted.   Psychiatric: Normal mood and affect.  Behavior is normal. Judgment normal.    Assessment/Plan     1)  Abnormal Imaging / Hemoptysis - first of all I do no think he has sarcoidosis.  I think he has some granulomatous inflammation related to coal workers pneumoconiosis that does not need any further evaluation / treatment.  CWP is unfortunately PET positive, but he has also had 2 separate early stage lung cancers.  Bronch on 11/6 showed blood clots emanating from the RUBIO.  Underwent bronchial artery embolization to that area.  Pathology from bronch shows malignancy in 4L lymph node - NSCLC - favor adeno, and suspicious BAL RUBIO.      Will refer back to Dr. Denny for definitive management of his NSCLC.  This would be at least 3A, possible 3B disease if right SC LN is involved.  I do not think he is a surgical candidate given prior lobectomy and wedge resection, along with other PET+ lesions that could possibly represent malignancy vs CWP.  I do think he could benefit from at least palliative radiation to RUBIO for hemoptysis if this continues despite SHANT.  Only other option would be to try  pulmonary artery embolization in the RUBIO.  I would reserve surgery for emergent life threatening hemoptysis given his active malignancy.    RTC 1 month to follow up on hemoptysis    Arnold You MD  Pulmonology and Critical Care Medicine  11/11/19 2:49 PM  Electronically Signed    C.C.:  Padmaja Denny MD, Michael Schreiber MD

## 2019-11-12 ENCOUNTER — MDT ASSESSMENT (OUTPATIENT)
Dept: ONCOLOGY | Facility: CLINIC | Age: 64
End: 2019-11-12

## 2019-11-12 ENCOUNTER — OFFICE VISIT (OUTPATIENT)
Dept: RADIATION ONCOLOGY | Facility: HOSPITAL | Age: 64
End: 2019-11-12

## 2019-11-12 ENCOUNTER — HOSPITAL ENCOUNTER (OUTPATIENT)
Dept: RADIATION ONCOLOGY | Facility: HOSPITAL | Age: 64
Setting detail: RADIATION/ONCOLOGY SERIES
Discharge: HOME | End: 2019-11-12

## 2019-11-12 ENCOUNTER — OFFICE VISIT (OUTPATIENT)
Dept: ONCOLOGY | Facility: CLINIC | Age: 64
End: 2019-11-12

## 2019-11-12 VITALS
SYSTOLIC BLOOD PRESSURE: 144 MMHG | WEIGHT: 166.3 LBS | TEMPERATURE: 97.8 F | RESPIRATION RATE: 18 BRPM | HEART RATE: 73 BPM | DIASTOLIC BLOOD PRESSURE: 72 MMHG | OXYGEN SATURATION: 92 % | BODY MASS INDEX: 26.84 KG/M2

## 2019-11-12 VITALS
DIASTOLIC BLOOD PRESSURE: 74 MMHG | SYSTOLIC BLOOD PRESSURE: 142 MMHG | BODY MASS INDEX: 26.68 KG/M2 | HEIGHT: 66 IN | RESPIRATION RATE: 16 BRPM | TEMPERATURE: 97.8 F | WEIGHT: 166 LBS | OXYGEN SATURATION: 98 % | HEART RATE: 83 BPM

## 2019-11-12 DIAGNOSIS — C34.32 MALIGNANT NEOPLASM OF LOWER LOBE OF LEFT LUNG (HCC): Primary | ICD-10-CM

## 2019-11-12 DIAGNOSIS — C34.11 MALIGNANT NEOPLASM OF UPPER LOBE OF RIGHT LUNG (HCC): ICD-10-CM

## 2019-11-12 DIAGNOSIS — C34.12 MALIGNANT NEOPLASM OF UPPER LOBE OF LEFT LUNG (HCC): Primary | ICD-10-CM

## 2019-11-12 PROCEDURE — 99214 OFFICE O/P EST MOD 30 MIN: CPT | Performed by: INTERNAL MEDICINE

## 2019-11-12 PROCEDURE — 77280 THER RAD SIMULAJ FIELD SMPL: CPT | Performed by: RADIOLOGY

## 2019-11-12 NOTE — PROGRESS NOTES
DATE OF VISIT: 11/12/2019    REASON FOR VISIT: Recurrent adenocarcinoma of the lung       HISTORY OF PRESENT ILLNESS: The patient is a very pleasant 64 y.o. male  with past medical history significant for lung cancer diagnosed 6/22/2016 . 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 PET scan done 6/22/2016 showed findings consistent with recurrent neoplasm in the left upper lobe, with metastatic adenopathy to the left hilum and AP window as well as a metastatic nodule in the left lower lobe. CT-guided biopsy of the left lower lobe nodule was performed that showed adenocarcinoma of the lung. The patient underwent CyberKnife therapy by Dr Pinzon completed 9/29/2016.  Repeat scan showed gradual increase in size of left upper lobe lung infiltrates.  Patient had whole-body PET scan that revealed increased metabolic activity in the left lung infiltrate as well as a new right supraclavicular lymph nodes.  Patient had bronchoscopy with biopsy done by Dr. You on November 6, 2019 that revealed adenocarcinoma and level 4 left mediastinal lymph node.  The patient is here today for scheduled follow up visit.     SUBJECTIVE: The patient is here today by himself.  His hemoptysis is just got better today.  He coughed up blood 5 times yesterday 2 days been only 2 times.  He is anxious about the next step.    PAST MEDICAL HISTORY/SOCIAL HISTORY/FAMILY HISTORY: Unchanged from my prior documentation done on 08/02/2016.     Review of Systems   Constitutional: Negative for activity change, appetite change, chills, fatigue, fever and unexpected weight change.   HENT: Negative for hearing loss, mouth sores, nosebleeds, sore throat and trouble swallowing.    Eyes: Negative for visual disturbance.   Respiratory: Positive for shortness of breath and wheezing. Negative for cough and chest tightness.         With activity   Cardiovascular: Negative for  "chest pain, palpitations and leg swelling.   Gastrointestinal: Negative for abdominal distention, abdominal pain, blood in stool, constipation, diarrhea, nausea, rectal pain and vomiting.   Endocrine: Negative for cold intolerance and heat intolerance.   Genitourinary: Negative for difficulty urinating, dysuria, frequency and urgency.   Musculoskeletal: Negative for arthralgias, back pain, gait problem, joint swelling and myalgias.   Skin: Negative for rash.   Neurological: Negative for dizziness, tremors, syncope, weakness, light-headedness, numbness and headaches.   Hematological: Negative for adenopathy. Does not bruise/bleed easily.   Psychiatric/Behavioral: Negative for confusion, sleep disturbance and suicidal ideas. The patient is not nervous/anxious.          Current Outpatient Medications:   •  albuterol (PROVENTIL HFA;VENTOLIN HFA) 108 (90 BASE) MCG/ACT inhaler, Inhale 2 puffs every 4 (four) hours as needed for wheezing or shortness of air., Disp: , Rfl:   •  guaiFENesin (MUCINEX) 600 MG 12 hr tablet, Take 1 tablet by mouth 2 (Two) Times a Day., Disp: 60 tablet, Rfl: 5  •  HYDROcodone-acetaminophen (NORCO) 7.5-325 MG per tablet, Take 1 tablet by mouth every 6 (six) hours as needed for moderate pain (4-6)., Disp: , Rfl:   •  levocetirizine (XYZAL) 5 MG tablet, Take 5 mg by mouth Every Evening., Disp: , Rfl:   •  levothyroxine (SYNTHROID, LEVOTHROID) 75 MCG tablet, Take 75 mcg by mouth Daily., Disp: , Rfl:   •  omeprazole (priLOSEC) 40 MG capsule, Take 40 mg by mouth Daily., Disp: , Rfl:   •  SYMBICORT 160-4.5 MCG/ACT inhaler, INL 2 PUFFS PO BID, Disp: , Rfl: 3  •  tamsulosin (FLOMAX) 0.4 MG capsule 24 hr capsule, Take 1 capsule by mouth Daily., Disp: 30 capsule, Rfl: 6    PHYSICAL EXAMINATION:   /74   Pulse 83   Temp 97.8 °F (36.6 °C) (Temporal)   Resp 16   Ht 167.6 cm (66\")   Wt 75.3 kg (166 lb)   SpO2 98%   BMI 26.79 kg/m²    ECOG Performance Status: 1 - Symptomatic but completely " ambulatory  General Appearance:  alert, cooperative, no apparent distress and appears stated age   Neurologic/Psychiatric: A&O x 3, gait steady, appropriate affect, strength 5/5 in all muscle groups   HEENT:  Normocephalic, without obvious abnormality, mucous membranes moist   Neck: Supple, symmetrical, trachea midline, no adenopathy;  No thyromegaly, masses, or tenderness   Lungs:   Clear to auscultation bilaterally; respirations regular, even, and unlabored bilaterally   Heart:  Regular rate and rhythm, no murmurs appreciated   Abdomen:   Soft, non-tender, non-distended and no organomegaly   Lymph nodes: No cervical, supraclavicular, inguinal or axillary adenopathy noted   Extremities: Normal, atraumatic; no clubbing, cyanosis, or edema    Skin: No rashes, ulcers, or suspicious lesions noted     Admission on 11/06/2019, Discharged on 11/07/2019   Component Date Value Ref Range Status   • Case Report 11/06/2019    Final                    Value:Non-gynecologic Cytology                          Case: [id number]                                  Authorizing Provider:  Arnold You           Collected:           11/06/2019 03:22 PM                                 [facility] [place]                                                               Ordering Location:     [facility]   Received:            11/07/2019 06:54 AM                                 [facility]                                                                  Pathologist:           Cecily Guido MD                                                        Specimens:   1) - Lymph Node, Station 7 TBNA in cyto                                                             2) - Lymph Node, 4L TBNA in cyto                                                                    3) - Lymph Node, 10L TBNA in cyto                                                                   4) - Lymph Node, 11L TBNA in cyto                                                                                              5) - Lung, Left Upper Lobe, RUBIO BAL for cyto                                              • Final Diagnosis 11/06/2019    Final                    Value:This result contains rich text formatting which cannot be displayed here.   • Case Report 11/06/2019    Final                    Value:Surgical Pathology Report                         Case: [id number]                                  Authorizing Provider:  Avelino Barrett           Collected:           11/06/2019 03:58 PM                                 MD [place]                                                               Ordering Location:     [facility]   Received:            11/07/2019 06:53 AM                                 [facility]                                                                  Pathologist:           Filiberto Silveira MD                                                           Specimen:    Lung, Left Upper Lobe, RUBIO transbronchial bx                                              • Clinical Information 11/06/2019    Final                    Value:This result contains rich text formatting which cannot be displayed here.   • Final Diagnosis 11/06/2019    Final                    Value:This result contains rich text formatting which cannot be displayed here.   • Gross Description 11/06/2019    Final                    Value:This result contains rich text formatting which cannot be displayed here.   • Microscopic Description 11/06/2019    Final                    Value:This result contains rich text formatting which cannot be displayed here.   • Fungus Culture 11/06/2019 No fungus isolated at less than 1 week   Preliminary   • AFB Culture 11/06/2019 No AFB isolated at less than 1 week   Preliminary   • AFB Stain 11/06/2019 No acid fast bacilli seen on concentrated smear   Preliminary   • Respiratory Culture 11/06/2019 Scant growth (1+) Normal Respiratory Marisel   Final   •  Gram Stain 11/06/2019 Few (2+) WBCs per low power field   Final   • Gram Stain 11/06/2019 No organisms seen   Final   • WBC 11/07/2019 8.82  3.40 - 10.80 10*3/mm3 Final   • RBC 11/07/2019 4.71  4.14 - 5.80 10*6/mm3 Final   • Hemoglobin 11/07/2019 13.9  13.0 - 17.7 g/dL Final   • Hematocrit 11/07/2019 42.4  37.5 - 51.0 % Final   • MCV 11/07/2019 90.0  79.0 - 97.0 fL Final   • MCH 11/07/2019 29.5  26.6 - 33.0 pg Final   • MCHC 11/07/2019 32.8  31.5 - 35.7 g/dL Final   • RDW 11/07/2019 12.8  12.3 - 15.4 % Final   • RDW-SD 11/07/2019 42.2  37.0 - 54.0 fl Final   • MPV 11/07/2019 9.8  6.0 - 12.0 fL Final   • Platelets 11/07/2019 219  140 - 450 10*3/mm3 Final   Appointment on 11/05/2019   Component Date Value Ref Range Status   • PTT 11/05/2019 29.8  24.0 - 37.0 seconds Final   • WBC 11/05/2019 5.77  3.40 - 10.80 10*3/mm3 Final   • RBC 11/05/2019 4.67  4.14 - 5.80 10*6/mm3 Final   • Hemoglobin 11/05/2019 13.9  13.0 - 17.7 g/dL Final   • Hematocrit 11/05/2019 41.9  37.5 - 51.0 % Final   • MCV 11/05/2019 89.7  79.0 - 97.0 fL Final   • MCH 11/05/2019 29.8  26.6 - 33.0 pg Final   • MCHC 11/05/2019 33.2  31.5 - 35.7 g/dL Final   • RDW 11/05/2019 12.7  12.3 - 15.4 % Final   • RDW-SD 11/05/2019 41.7  37.0 - 54.0 fl Final   • MPV 11/05/2019 10.0  6.0 - 12.0 fL Final   • Platelets 11/05/2019 211  140 - 450 10*3/mm3 Final   • Glucose 11/05/2019 105* 65 - 99 mg/dL Final   • BUN 11/05/2019 9  8 - 23 mg/dL Final   • Creatinine 11/05/2019 0.81  0.76 - 1.27 mg/dL Final   • Sodium 11/05/2019 143  136 - 145 mmol/L Final   • Potassium 11/05/2019 3.7  3.5 - 5.2 mmol/L Final   • Chloride 11/05/2019 102  98 - 107 mmol/L Final   • CO2 11/05/2019 30.0* 22.0 - 29.0 mmol/L Final   • Calcium 11/05/2019 8.7  8.6 - 10.5 mg/dL Final   • Total Protein 11/05/2019 6.9  6.0 - 8.5 g/dL Final   • Albumin 11/05/2019 4.10  3.50 - 5.20 g/dL Final   • ALT (SGPT) 11/05/2019 12  1 - 41 U/L Final   • AST (SGOT) 11/05/2019 22  1 - 40 U/L Final   • Alkaline  Phosphatase 11/05/2019 93  39 - 117 U/L Final   • Total Bilirubin 11/05/2019 0.3  0.2 - 1.2 mg/dL Final   • eGFR Non African Amer 11/05/2019 96  >60 mL/min/1.73 Final   • Globulin 11/05/2019 2.8  gm/dL Final   • A/G Ratio 11/05/2019 1.5  g/dL Final   • BUN/Creatinine Ratio 11/05/2019 11.1  7.0 - 25.0 Final   • Anion Gap 11/05/2019 11.0  5.0 - 15.0 mmol/L Final   • Protime 11/05/2019 12.8  11.2 - 14.3 Seconds Final   • INR 11/05/2019 1.01  0.85 - 1.16 Final        Xr Chest 1 View    Result Date: 11/8/2019  Narrative: EXAMINATION: XR CHEST 1 VW-  INDICATION: Postop bronch; R04.2-Hemoptysis.  COMPARISON: Chest radiograph 08/26/2016.  FINDINGS: Single portable chest radiograph was submitted for review. The heart is not enlarged. Left lower lobe postsurgical change is identified. Hazy left upper lobe airspace changes with postsurgical suture identified. No postprocedural pneumothorax identified.         Impression: Left upper lobe postsurgical changes in airspace disease without evidence of postprocedural pneumothorax.  D:  11/07/2019 E:  11/07/2019  This report was finalized on 11/8/2019 9:06 AM by Dr. Gael Doyle MD.      Fl C Arm During Surgery    Result Date: 11/9/2019  Narrative: EXAMINATION: FLUOROSCOPY C-ARM DURING SURGERY-  INDICATION: R04.2-Hemoptysis; bronchoscopy, shortness of breath.  COMPARISON: None.  FINDINGS: 1.24 minutes of fluoroscopy used for bronchoscopy. Please see the procedural report for full details.      Impression: Fluoroscopy performed for bronchoscopy. Please see the procedural report for full details.  D:  11/07/2019 E:  11/08/2019  This report was finalized on 11/9/2019 12:21 PM by Dr. Henrietta Menjivar MD.      Nm Pet Skull Base To Mid Thigh    Result Date: 10/26/2019  Narrative: EXAMINATION: NM PET/CT SCAN, SKULL BASE TO MID THIGHS - 10/25/2019  INDICATION: C34.32-Malignant neoplasm of lower lobe, left bronchus or lung; C34.11-Malignant neoplasm of upper lobe, right bronchus or  lung.  TECHNIQUE: With fasting blood glucose level of 115 mg/dl, a total of 12.5 mCi of FDG was administered via right antecubital vein. Following appropriate delay, PET and CT images were obtained from the level of the skull vertex to the mid thighs and fused multiplanar images reconstructed. The CT scan is an unenhanced low-dose study for reference to the PET scan only and does not constitute a standard diagnostic CT scan.  COMPARISON: 06/10/2019 whole-body PET/CT scan.  FINDINGS: Previous exam report from 06/10/2019 indicates mild progression of disease with interval increase in activity in the patient's multiple chest lesions. Adenocarcinoma of the lung.  Today's study appears to show increased activity in the right and left lower neck, and stable or only slightly increased hypermetabolic activity in the mediastinum and left lung.  Multiplanar images show no significant asymmetry of uptake in the brain. No hypermetabolic node or mass is seen in the neck down to the right subclavicular region, where there is an enlarging, increasingly hypermetabolic node deep to the right clavicle, today measuring maximum SUV of 5.9, previously only 2.9. There appear to be a couple of borderline hypermetabolic new left neck nodes at this same level maximal SUV 2.0, previously with no visible nodes here and maximal SUV of 1.2 in this area. The patient's presumably treated left neck mass is approximately stable with maximum SUV of 8.8 today and 9.1 previously.  AP window lymph nodes have maximum SUV of 4.2 today and previously 4.1. Small left hilar nodes measure 3.6 today and previously 3.5. Posterior right lower lobe mass has maximal SUV of 3.4, previously 3.7.  Below the diaphragm, no hypermetabolic liver or adrenal lesions are identified. There is expected GI and  tract activity. No hypermetabolic node or mass is seen in the abdomen or pelvis. No pathologic marrow space uptake is seen.  Review of CT scan shows diffuse dense  groundglass and reticular disease in the left upper lobe surrounding the patient's left upper lung nodule, presumably post irradiation change. A pneumonia, if present, would appear similar, however. Peripheral and nodular nonhypermetabolic disease elsewhere in the lungs appear stable.      Impression: Increasing activity in the right and left lower neck/subclavicular nodes. Essentially stable activity in the patient's left lung mass and mediastinal nodes compared to 06/10/2019. No clearly new or progressive disease elsewhere.   DICTATED:   10/25/2019 EDITED/ls :   10/25/2019  This report was finalized on 10/26/2019 5:16 PM by DR. Curtis Lyons MD.    (  ASSESSMENT: The patient is a very pleasant 61 y.o. male with metastatic adenocarcinoma of the lung    PROBLEM LIST:  1. Non-small cell lung cancer originally diagnosed in 2009 status post right upper lobectomy done by Dr. Real in 2009.  2. Left upper lobe lung nodules found on surveillance CT in 2012, status post left upper lobe wedge resection with benign pathology.  3.  Left lung cancer, adenocarcinoma:  A.  Presented as a new left lower lobe lung nodule found on chest x-ray done 6/2016.   B. CT scan of the chest done 6/16/2016 showing interval enlargement of left lung nodules. PET CT showing hypermetabolic activity in the left lower lobe nodule.   C. CT guided biopsy of left lower lobe nodule positive for adenocarcinoma of the lung. [id number], stage Ia.  C. Status post CyberKnife therapy completed 9/29/2016 per Dr. Lesia CHÁVEZ.  Repeated PET scan done on October 2019 revealed increase in size as well as hypermetabolic activity left upper lobe lung nodule with a new hypermetabolic active right supraclavicular lymph node and essentially other stable findings.  E.  Status post bronchoscopy with biopsy done by Dr. You November 6, 2019 revealed adenocarcinoma from level 4 left and suspicious cytology from left upper lobe.  4.  Hemoptysis:  A.  Status post venous  embolization done by Dr. Camp November 7, 2019  4.  Black lung  5. Sarcoidosis  6. Hypothyroidism    PLAN:  1.  I discussed the case at tumor board this morning the consensus to proceed with palliative radiation and left upper lobe mass trying to stop hemoptysis.  This should be followed by systemic treatment.  The patient is not a candidate for surgical resection.  2.  I will send molecular testing today through a liquid biopsy as well as a request PDL 1 testing on tissue.  3. We will continue to monitor the patient's labs with CBC and CMP on return.   4.  The patient can continue Mucinex 600 mg take by mouth twice per day as needed for congestion and cough.   5.  I discussed the case with Dr. Kaiser from radiation oncology who kindly agreed to see the patient today.  6. He will continue levothyroxine for hypothyroidism.   7.  Systemic treatment options include targeted treatment if patient has a  mutation versus immunotherapy alone versus immunotherapy plus chemotherapy.  Patient will not be a candidate for research given the fact that he had 2 previous cancers.  Padmaja Denny MD  11/12/2019

## 2019-11-12 NOTE — PROGRESS NOTES
CONSULTATION NOTE      :                                                          1955  DATE OF CONSULTATION:                       2019   REQUESTING PHYSICIAN:                   Padmaja Denny MD  REASON FOR CONSULTATION:            Cancer Staging  Malignant neoplasm of lower lobe of left lung, AdenoCA by CT FNA 2016  Staging form: Lung, AJCC V7  - Clinical stage from 2016: Stage IA (T1a, N0, M0) - Signed by Padmaja Denny MD on 2016  - Pathologic: Stage IA (T1b, N0, cM0) - Unsigned    Thank you for requesting my services in evaluation of this pleasant individual.  I am seeing them in outpatient consultation regarding a diagnosis of recurrent lung cancer with symptoms of hemoptysis.     BRIEF HISTORY:  The patient is a very pleasant 64 y.o. male  with past medical history significant for tobacco abuse, COPD, reportedly sarcoidosis, and 2 separate primary lung cancers.  He has been treated intermittently over the past 10 years including a right upper lobe lobectomy for a early stage non-small cell lung cancer that did not wire any further adjuvant treatment.  He underwent a left upper lobe wedge resection in  that was consistent with coal workers pneumoconiosis.  He has also been treated to a left lower lobe hypermetabolic nodule consistent with a non-small cell lung cancer using [employer] radiosurgery in .  He recently presented with a one-month history of progressive hemoptysis.  This has progressed both in frequency and quantity.  He underwent a bronchoscopy by Dr. You and the bleeding was identified to have been coming from the left upper lobe of his lung.  Dr. You was unable to navigate through the bleeding to actually identify the source or perform any site directed biopsies other than noting that all of the bleeding was coming from the left upper lobe.  He has a new PET scan that unfortunately shows multiple mediastinal lymph nodes, contralateral right supraclavicular  adenopathy, right sided new lung nodule, and an enlarging mass in the left upper lobe of the lung adjacent to the previous surgical scar.  Dr. You was unable to do any endoscopic procedures to mitigate the bleeding, and Mr. Alexis was subsequently sent to have a embolization procedure which was performed by Dr. Gutierrez on .  Unfortunately, Mr. Alexis states that his hemoptysis has continued to increase and he continues to have hemoptysis on an ongoing basis now, at times producing more than 1 cup of blood per day.  He denies any other complaints.  Dr. Denny is currently in the process of evaluating him for targeted therapy, and he asked me to see Mr. Alexis today for consideration of palliative radiation therapy.    No Known Allergies    Social History     Socioeconomic History   • Marital status:      Spouse name: Not on file   • Number of children: 0   • Years of education: Not on file   • Highest education level: Not on file   Occupational History     Employer: [employer]   Tobacco Use   • Smoking status: Former Smoker     Packs/day: 1.50     Years: 25.00     Pack years: 37.50     Types: Cigarettes     Last attempt to quit: 2006     Years since quittin.3   • Smokeless tobacco: Never Used   Substance and Sexual Activity   • Alcohol use: No   • Drug use: No   • Sexual activity: Yes     Partners: Female     Comment:     Social History Narrative        Previously worked in the [employer].    Smoked 1-1/2ppd for 25+ years, none since     No ETOh or illicit drugs       Past Medical History:   Diagnosis Date   • Back pain    • Black lung (CMS/HCC)    • COPD (chronic obstructive pulmonary disease) (CMS/HCC)    • GERD (gastroesophageal reflux disease)    • Hearing loss     WEARS AIDS   • Hypothyroidism    • Kidney stone    • Lung cancer (CMS/HCC)    • Sarcoidosis    • Wears glasses     READING       family history includes Colon cancer in his mother; Lung cancer in  his brother; Other in his father.     Past Surgical History:   Procedure Laterality Date   • BRONCHOSCOPY N/A 8/26/2016    Procedure: CHASITY BRONCHOSCOPY WITH PLACEMENT OF FIDUCIALS, EBUS WITH FLOURO;  Surgeon: Tommy You MD;  Location:  [facility];  Service:    • BRONCHOSCOPY  11/06/2019   • BRONCHOSCOPY N/A 11/6/2019    Procedure: Bronchoscopy with endobronchial ultrasound and transbronchial biopsy with fluoroscopy ;  Surgeon: Arnold You MD;  Location:  [facility];  Service: Pulmonary   • COLONOSCOPY  12/18/2007    DID COLOGAURD 2018   • INGUINAL HERNIA REPAIR Bilateral    • INTERVENTIONAL RADIOLOGY PROCEDURE N/A 11/7/2019    Procedure: COIL EMBOLIZATION;  Surgeon: Thomas Gutierrez MD;  Location:  [facility];  Service: Interventional Radiology   • LUNG BIOPSY Left 2016   • LUNG LOBECTOMY Right 2009    RIGHT UPPER LOBECTOMY   • LYMPH NODE BIOPSY      NECK   • THORACOTOMY Left     LEFT THORACOTOMY WITH WEDGE RESECTION        Review of Systems   HENT:   Positive for tinnitus.    Respiratory: Positive for cough (occ-reports 2 small spots of blood since 0430 this a.m.) and shortness of breath (with exertion).    All other systems reviewed and are negative.          Objective   VITAL SIGNS:   Vitals:    11/12/19 1410   BP: 144/72   Pulse: 73   Resp: 18   Temp: 97.8 °F (36.6 °C)   TempSrc: Temporal   SpO2: 92%   Weight: 75.4 kg (166 lb 4.8 oz)   PainSc: 0-No pain        Karnofsky score: 80        Physical Exam   Constitutional: He is oriented to person, place, and time. He appears well-developed and well-nourished. No distress.   HENT:   Head: Normocephalic and atraumatic.   Mouth/Throat: Oropharynx is clear and moist.   Eyes: Conjunctivae and EOM are normal. Pupils are equal, round, and reactive to light.   Neck: Normal range of motion. Neck supple.   Cardiovascular: Normal rate and regular rhythm. Exam reveals no friction rub.   No murmur heard.  Pulmonary/Chest:  Effort normal and breath sounds normal. He has no wheezes.   Coarse breath sounds left upper and mid lung fields   Abdominal: Soft. Bowel sounds are normal. He exhibits no distension and no mass. There is no tenderness.   Musculoskeletal: Normal range of motion. He exhibits no edema.   Lymphadenopathy:     He has no cervical adenopathy.   Neurological: He is alert and oriented to person, place, and time.   Skin: Skin is warm and dry.   Psychiatric: He has a normal mood and affect. His behavior is normal. Judgment and thought content normal.   Nursing note and vitals reviewed.    IMAGING  I have personally reviewed the relevant imaging studies, as follows:  Xr Chest 1 View    Result Date: 11/8/2019  Left upper lobe postsurgical changes in airspace disease without evidence of postprocedural pneumothorax.  D:  11/07/2019 E:  11/07/2019  This report was finalized on 11/8/2019 9:06 AM by Dr. Gael Doyle MD.      Fl C Arm During Surgery    Result Date: 11/9/2019  Fluoroscopy performed for bronchoscopy. Please see the procedural report for full details.  D:  11/07/2019 E:  11/08/2019  This report was finalized on 11/9/2019 12:21 PM by Dr. Henrietta Menjivar MD.      Nm Pet Skull Base To Mid Thigh    Result Date: 10/26/2019  Increasing activity in the right and left lower neck/subclavicular nodes. Essentially stable activity in the patient's left lung mass and mediastinal nodes compared to 06/10/2019. No clearly new or progressive disease elsewhere.   DICTATED:   10/25/2019 EDITED/ls :   10/25/2019  This report was finalized on 10/26/2019 5:16 PM by DR. Curtis Lyons MD.      PATHOLOGY  LEFT UPPER LOBE BIOPSY 11/6/2019:  Bronchial and peribronchial tissue with histiocytic infiltrate with anthracotic pigment and polarizable crystaline material noted focally.          The following portions of the patient's history were reviewed and updated as appropriate: allergies, current medications, past family history, past medical  history, past social history, past surgical history and problem list.    Assessment  Mr. Alexis is a 64 year old gentleman with recurrent lung cancer, who unfortunately shows more widespread disease now, but more importantly, he has what appears to be a recurrence in the left upper lobe causing hemoptysis and ongoing bleeding.  We discussed his case at the multidisciplinary tumor board this morning.  Although the original wedge resection from the left upper lobe was consistent with a coal workers pneumoconiosis, his PET scan shows new hypermetabolism and mass formation, and it was believed by the consensus of the entire multidisciplinary conference, that this new area of represents malignant transformation rather than persistent black lung.  He has already underwent a embolization in an effort to reduce the bleeding, yet he continues to have ongoing hemoptysis.  I discussed the risks and benefits of palliative radiation therapy to the left upper lobe of lung, and he was agreeable and willing to undergo treatment and has signed informed consent.  We completed a radiation set up an simulation session today and I intend to treat his left upper lobe of the lung to a dose of 30 Gray in 10 fractions using 3-dimensional techniques.  I am hopeful that I will be able to reduce his symptoms of hemoptysis within the next 24 to 48 hours.  I plan to have him start tomorrow, Wednesday, November 13, 2019.  It is anticipated that his molecular tests will take possibly 1-2 weeks to determine systemic therapy, so he should be completed with radiation and ready to begin therapy by the time those results are available.    RECOMMENDATIONS:     Return in 1 day (11/13/2019) for To Begin Radiation Treatment.  David was seen today for lung cancer.    Diagnoses and all orders for this visit:    Malignant neoplasm of upper lobe of left lung (CMS/HCC)    Thank you for allowing me to participate in the care of this individual.    Sincerely,        Gabriel Kaiser MD

## 2019-11-13 ENCOUNTER — HOSPITAL ENCOUNTER (OUTPATIENT)
Dept: RADIATION ONCOLOGY | Facility: HOSPITAL | Age: 64
Discharge: HOME | End: 2019-11-13

## 2019-11-13 PROCEDURE — 77307 TELETHX ISODOSE PLAN CPLX: CPT | Performed by: RADIOLOGY

## 2019-11-13 PROCEDURE — 77334 RADIATION TREATMENT AID(S): CPT | Performed by: RADIOLOGY

## 2019-11-13 PROCEDURE — 77280 THER RAD SIMULAJ FIELD SMPL: CPT | Performed by: RADIOLOGY

## 2019-11-13 PROCEDURE — 77412 RADIATION TX DELIVERY LVL 3: CPT | Performed by: RADIOLOGY

## 2019-11-14 ENCOUNTER — HOSPITAL ENCOUNTER (OUTPATIENT)
Dept: RADIATION ONCOLOGY | Facility: HOSPITAL | Age: 64
Discharge: HOME | End: 2019-11-14

## 2019-11-14 PROCEDURE — 77336 RADIATION PHYSICS CONSULT: CPT | Performed by: RADIOLOGY

## 2019-11-14 PROCEDURE — 77412 RADIATION TX DELIVERY LVL 3: CPT | Performed by: RADIOLOGY

## 2019-11-15 ENCOUNTER — HOSPITAL ENCOUNTER (OUTPATIENT)
Dept: RADIATION ONCOLOGY | Facility: HOSPITAL | Age: 64
Discharge: HOME | End: 2019-11-15

## 2019-11-15 PROCEDURE — 77412 RADIATION TX DELIVERY LVL 3: CPT | Performed by: RADIOLOGY

## 2019-11-18 ENCOUNTER — HOSPITAL ENCOUNTER (OUTPATIENT)
Dept: RADIATION ONCOLOGY | Facility: HOSPITAL | Age: 64
Discharge: HOME | End: 2019-11-18

## 2019-11-18 PROCEDURE — 77412 RADIATION TX DELIVERY LVL 3: CPT | Performed by: RADIOLOGY

## 2019-11-19 ENCOUNTER — HOSPITAL ENCOUNTER (OUTPATIENT)
Dept: RADIATION ONCOLOGY | Facility: HOSPITAL | Age: 64
Discharge: HOME | End: 2019-11-19

## 2019-11-19 ENCOUNTER — DOCUMENTATION (OUTPATIENT)
Dept: NUTRITION | Facility: HOSPITAL | Age: 64
End: 2019-11-19

## 2019-11-19 VITALS — BODY MASS INDEX: 27.34 KG/M2 | WEIGHT: 169.4 LBS

## 2019-11-19 PROCEDURE — 77412 RADIATION TX DELIVERY LVL 3: CPT | Performed by: RADIOLOGY

## 2019-11-19 NOTE — PROGRESS NOTES
Oncology Nutrition Screening    Patient Name:  David Alexis  YOB: 1955  MRN: [id number]  Date:  11/19/19  Physician:  Dr. Kaiser    Type of Cancer Treatment:   Radiation:  Treating left upper lung to a dose of 30 Gray in 10 fractions using 3-dimensional techniques     Patient Active Problem List   Diagnosis   • Malignant neoplasm of lower lobe of left lung, AdenoCA by CT FNA 2016   • Malignant neoplasm of upper lobe of right lung, S/P RUL Lobectomy 2009   • Benign RUBIO Lung mass, S/P Wedge Biopsy 2012   • Former smoker   • COPD (chronic obstructive pulmonary disease), Moderate   • Presumed Sarcoidosis, Non-Caseating Granuloma on mediastinoscopy   • CWP (coalworkers pneumoconiosis) (CMS/HCC)   • Hemoptysis   • GERD without esophagitis   • Hypothyroidism (acquired)   • Malignant neoplasm of upper lobe of left lung (CMS/HCC)     Recurrent in the left upper lobe with more widespread disease  Current Outpatient Medications   Medication Sig Dispense Refill   • albuterol (PROVENTIL HFA;VENTOLIN HFA) 108 (90 BASE) MCG/ACT inhaler Inhale 2 puffs every 4 (four) hours as needed for wheezing or shortness of air.     • guaiFENesin (MUCINEX) 600 MG 12 hr tablet Take 1 tablet by mouth 2 (Two) Times a Day. 60 tablet 5   • HYDROcodone-acetaminophen (NORCO) 7.5-325 MG per tablet Take 1 tablet by mouth every 6 (six) hours as needed for moderate pain (4-6).     • levocetirizine (XYZAL) 5 MG tablet Take 5 mg by mouth Every Evening.     • levothyroxine (SYNTHROID, LEVOTHROID) 75 MCG tablet Take 75 mcg by mouth Daily.     • omeprazole (priLOSEC) 40 MG capsule Take 40 mg by mouth Daily.     • SYMBICORT 160-4.5 MCG/ACT inhaler INL 2 PUFFS PO BID  3   • tamsulosin (FLOMAX) 0.4 MG capsule 24 hr capsule Take 1 capsule by mouth Daily. 30 capsule 6     No current facility-administered medications for this visit.        Glycemic Risk:   NA    Weight:   Height: 66 inches  Weight: 169.4 lbs.  Usual Body Weight: same    BMI: 26.8   Overweight  Weight has been stable    Oral Food Intake:  Regular Diet - No Restrictions    Hydration Status:   How many 8 ounce glass of water of fluid do you drink per day?  To be assessed    Enteral Feeding:   NA    Nutrition Symptoms:   Fatigue    Activity:   Not my normal self, but able to be up and about with fairly normal activities     reports that he quit smoking about 13 years ago. His smoking use included cigarettes. He has a 37.50 pack-year smoking history. He has never used smokeless tobacco. He reports that he does not drink alcohol or use drugs.    Evaluation of Nutritional Risk:   Patient is identified to be at potential for nutritional risk related to progressive disease and treatment.      Patient states that he lives with his wife; patient does most of the food preparation related to his wife working outside of the home.  Patient states that he has never eaten meat, seafood or poultry; he consumes a plant based diet with dried beans and legumes being the predominant source of protein intake, along with nuts and nut based foods.  He states that weight has remained stable without significant change for years.    Discussed the importance of nutrition, maintaining focus on higher protein intake and continuation of his plant based diet; adequate hydration and physical activity as he is able to combat fatigue.  He denies any nutritional impact symptoms related to treatment, including swallowing issues.  Will continue to follow as indicated.      Electronically signed by:  Ana Gallardo RD  1:14 PM

## 2019-11-20 ENCOUNTER — HOSPITAL ENCOUNTER (OUTPATIENT)
Dept: RADIATION ONCOLOGY | Facility: HOSPITAL | Age: 64
Discharge: HOME | End: 2019-11-20

## 2019-11-20 PROCEDURE — 77417 THER RADIOLOGY PORT IMAGE(S): CPT | Performed by: RADIOLOGY

## 2019-11-20 PROCEDURE — 77412 RADIATION TX DELIVERY LVL 3: CPT | Performed by: RADIOLOGY

## 2019-11-21 ENCOUNTER — HOSPITAL ENCOUNTER (OUTPATIENT)
Dept: RADIATION ONCOLOGY | Facility: HOSPITAL | Age: 64
Discharge: HOME | End: 2019-11-21

## 2019-11-21 PROCEDURE — 77412 RADIATION TX DELIVERY LVL 3: CPT | Performed by: RADIOLOGY

## 2019-11-21 PROCEDURE — 77336 RADIATION PHYSICS CONSULT: CPT | Performed by: RADIOLOGY

## 2019-11-22 ENCOUNTER — HOSPITAL ENCOUNTER (OUTPATIENT)
Dept: RADIATION ONCOLOGY | Facility: HOSPITAL | Age: 64
Discharge: HOME | End: 2019-11-22

## 2019-11-22 PROCEDURE — 77412 RADIATION TX DELIVERY LVL 3: CPT | Performed by: RADIOLOGY

## 2019-11-25 ENCOUNTER — HOSPITAL ENCOUNTER (OUTPATIENT)
Dept: RADIATION ONCOLOGY | Facility: HOSPITAL | Age: 64
Discharge: HOME | End: 2019-11-25

## 2019-11-25 PROCEDURE — 77412 RADIATION TX DELIVERY LVL 3: CPT | Performed by: RADIOLOGY

## 2019-11-26 ENCOUNTER — HOSPITAL ENCOUNTER (OUTPATIENT)
Dept: ONCOLOGY | Facility: HOSPITAL | Age: 64
Discharge: HOME | End: 2019-11-26
Admitting: INTERNAL MEDICINE

## 2019-11-26 ENCOUNTER — OFFICE VISIT (OUTPATIENT)
Dept: ONCOLOGY | Facility: CLINIC | Age: 64
End: 2019-11-26

## 2019-11-26 ENCOUNTER — HOSPITAL ENCOUNTER (OUTPATIENT)
Dept: RADIATION ONCOLOGY | Facility: HOSPITAL | Age: 64
Discharge: HOME | End: 2019-11-26

## 2019-11-26 VITALS — BODY MASS INDEX: 27.21 KG/M2 | WEIGHT: 168.6 LBS

## 2019-11-26 VITALS
SYSTOLIC BLOOD PRESSURE: 144 MMHG | BODY MASS INDEX: 27 KG/M2 | HEART RATE: 77 BPM | RESPIRATION RATE: 16 BRPM | TEMPERATURE: 97.7 F | OXYGEN SATURATION: 98 % | WEIGHT: 168 LBS | DIASTOLIC BLOOD PRESSURE: 82 MMHG | HEIGHT: 66 IN

## 2019-11-26 DIAGNOSIS — C34.12 MALIGNANT NEOPLASM OF UPPER LOBE OF LEFT LUNG (HCC): ICD-10-CM

## 2019-11-26 DIAGNOSIS — C34.32 MALIGNANT NEOPLASM OF LOWER LOBE OF LEFT LUNG (HCC): Primary | ICD-10-CM

## 2019-11-26 DIAGNOSIS — C34.12 MALIGNANT NEOPLASM OF UPPER LOBE OF LEFT LUNG (HCC): Primary | ICD-10-CM

## 2019-11-26 LAB
ALBUMIN SERPL-MCNC: 4 G/DL (ref 3.5–5.2)
ALBUMIN/GLOB SERPL: 1.1 G/DL
ALP SERPL-CCNC: 91 U/L (ref 39–117)
ALT SERPL W P-5'-P-CCNC: 12 U/L (ref 1–41)
ANION GAP SERPL CALCULATED.3IONS-SCNC: 11 MMOL/L (ref 5–15)
AST SERPL-CCNC: 16 U/L (ref 1–40)
BILIRUB SERPL-MCNC: 0.3 MG/DL (ref 0.2–1.2)
BUN BLD-MCNC: 14 MG/DL (ref 8–23)
BUN/CREAT SERPL: 16.5 (ref 7–25)
CALCIUM SPEC-SCNC: 9.3 MG/DL (ref 8.6–10.5)
CHLORIDE SERPL-SCNC: 100 MMOL/L (ref 98–107)
CO2 SERPL-SCNC: 30 MMOL/L (ref 22–29)
CREAT BLD-MCNC: 0.85 MG/DL (ref 0.76–1.27)
GFR SERPL CREATININE-BSD FRML MDRD: 90 ML/MIN/1.73
GLOBULIN UR ELPH-MCNC: 3.5 GM/DL
GLUCOSE BLD-MCNC: 121 MG/DL (ref 65–99)
POTASSIUM BLD-SCNC: 4.2 MMOL/L (ref 3.5–5.2)
PROT SERPL-MCNC: 7.5 G/DL (ref 6–8.5)
SODIUM BLD-SCNC: 141 MMOL/L (ref 136–145)
T4 FREE SERPL-MCNC: 1.34 NG/DL (ref 0.93–1.7)
TSH SERPL DL<=0.05 MIU/L-ACNC: 4.68 UIU/ML (ref 0.27–4.2)

## 2019-11-26 PROCEDURE — 77412 RADIATION TX DELIVERY LVL 3: CPT | Performed by: RADIOLOGY

## 2019-11-26 PROCEDURE — 99215 OFFICE O/P EST HI 40 MIN: CPT | Performed by: INTERNAL MEDICINE

## 2019-11-26 PROCEDURE — 80053 COMPREHEN METABOLIC PANEL: CPT | Performed by: INTERNAL MEDICINE

## 2019-11-26 PROCEDURE — 25010000002 CYANOCOBALAMIN PER 1000 MCG: Performed by: INTERNAL MEDICINE

## 2019-11-26 PROCEDURE — 84443 ASSAY THYROID STIM HORMONE: CPT | Performed by: INTERNAL MEDICINE

## 2019-11-26 PROCEDURE — 84439 ASSAY OF FREE THYROXINE: CPT | Performed by: INTERNAL MEDICINE

## 2019-11-26 PROCEDURE — 36415 COLL VENOUS BLD VENIPUNCTURE: CPT

## 2019-11-26 PROCEDURE — 96372 THER/PROPH/DIAG INJ SC/IM: CPT

## 2019-11-26 RX ORDER — PALONOSETRON 0.05 MG/ML
0.25 INJECTION, SOLUTION INTRAVENOUS ONCE
Status: CANCELLED | OUTPATIENT
Start: 2019-12-03

## 2019-11-26 RX ORDER — DIPHENHYDRAMINE HYDROCHLORIDE 50 MG/ML
50 INJECTION INTRAMUSCULAR; INTRAVENOUS AS NEEDED
Status: CANCELLED | OUTPATIENT
Start: 2019-12-03

## 2019-11-26 RX ORDER — SODIUM CHLORIDE 9 MG/ML
250 INJECTION, SOLUTION INTRAVENOUS ONCE
Status: CANCELLED | OUTPATIENT
Start: 2019-12-03

## 2019-11-26 RX ORDER — CYANOCOBALAMIN 1000 UG/ML
1000 INJECTION, SOLUTION INTRAMUSCULAR; SUBCUTANEOUS ONCE
Status: COMPLETED | OUTPATIENT
Start: 2019-11-26 | End: 2019-11-26

## 2019-11-26 RX ORDER — CYANOCOBALAMIN 1000 UG/ML
1000 INJECTION, SOLUTION INTRAMUSCULAR; SUBCUTANEOUS ONCE
Status: CANCELLED | OUTPATIENT
Start: 2019-11-26 | End: 2019-11-26

## 2019-11-26 RX ORDER — FAMOTIDINE 10 MG/ML
20 INJECTION, SOLUTION INTRAVENOUS AS NEEDED
Status: CANCELLED | OUTPATIENT
Start: 2019-12-03

## 2019-11-26 RX ADMIN — CYANOCOBALAMIN 1000 MCG: 1000 INJECTION, SOLUTION INTRAMUSCULAR; SUBCUTANEOUS at 13:50

## 2019-11-26 NOTE — PROGRESS NOTES
DATE OF VISIT: 11/26/2019    REASON FOR VISIT: Recurrent adenocarcinoma of the lung       HISTORY OF PRESENT ILLNESS: The patient is a very pleasant 64 y.o. male  with past medical history significant for lung cancer diagnosed 6/22/2016 . 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 PET scan done 6/22/2016 showed findings consistent with recurrent neoplasm in the left upper lobe, with metastatic adenopathy to the left hilum and AP window as well as a metastatic nodule in the left lower lobe. CT-guided biopsy of the left lower lobe nodule was performed that showed adenocarcinoma of the lung. The patient underwent CyberKnife therapy by Dr Pinzon completed 9/29/2016.  Repeat scan showed gradual increase in size of left upper lobe lung infiltrates.  Patient had whole-body PET scan that revealed increased metabolic activity in the left lung infiltrate as well as a new right supraclavicular lymph nodes.  Patient had bronchoscopy with biopsy done by Dr. You on November 6, 2019 that revealed adenocarcinoma and level 4 left mediastinal lymph node.  The patient is here today for scheduled follow up visit.     SUBJECTIVE: The patient is here today with his wife.  His hemoptysis has improved.  He completed his short course of radiation today.    PAST MEDICAL HISTORY/SOCIAL HISTORY/FAMILY HISTORY: Unchanged from my prior documentation done on 08/02/2016.     Review of Systems   Constitutional: Negative for activity change, appetite change, chills, fatigue, fever and unexpected weight change.   HENT: Negative for hearing loss, mouth sores, nosebleeds, sore throat and trouble swallowing.    Eyes: Negative for visual disturbance.   Respiratory: Positive for shortness of breath and wheezing. Negative for cough and chest tightness.         With activity   Cardiovascular: Negative for chest pain, palpitations and leg swelling.   Gastrointestinal:  "Negative for abdominal distention, abdominal pain, blood in stool, constipation, diarrhea, nausea, rectal pain and vomiting.   Endocrine: Negative for cold intolerance and heat intolerance.   Genitourinary: Negative for difficulty urinating, dysuria, frequency and urgency.   Musculoskeletal: Negative for arthralgias, back pain, gait problem, joint swelling and myalgias.   Skin: Negative for rash.   Neurological: Negative for dizziness, tremors, syncope, weakness, light-headedness, numbness and headaches.   Hematological: Negative for adenopathy. Does not bruise/bleed easily.   Psychiatric/Behavioral: Negative for confusion, sleep disturbance and suicidal ideas. The patient is not nervous/anxious.          Current Outpatient Medications:   •  albuterol (PROVENTIL HFA;VENTOLIN HFA) 108 (90 BASE) MCG/ACT inhaler, Inhale 2 puffs every 4 (four) hours as needed for wheezing or shortness of air., Disp: , Rfl:   •  guaiFENesin (MUCINEX) 600 MG 12 hr tablet, Take 1 tablet by mouth 2 (Two) Times a Day., Disp: 60 tablet, Rfl: 5  •  HYDROcodone-acetaminophen (NORCO) 7.5-325 MG per tablet, Take 1 tablet by mouth every 6 (six) hours as needed for moderate pain (4-6)., Disp: , Rfl:   •  levocetirizine (XYZAL) 5 MG tablet, Take 5 mg by mouth Every Evening., Disp: , Rfl:   •  levothyroxine (SYNTHROID, LEVOTHROID) 75 MCG tablet, Take 75 mcg by mouth Daily., Disp: , Rfl:   •  omeprazole (priLOSEC) 40 MG capsule, Take 40 mg by mouth Daily., Disp: , Rfl:   •  SYMBICORT 160-4.5 MCG/ACT inhaler, INL 2 PUFFS PO BID, Disp: , Rfl: 3  •  tamsulosin (FLOMAX) 0.4 MG capsule 24 hr capsule, Take 1 capsule by mouth Daily., Disp: 30 capsule, Rfl: 6    PHYSICAL EXAMINATION:   /82   Pulse 77   Temp 97.7 °F (36.5 °C) (Temporal)   Resp 16   Ht 167.6 cm (66\")   Wt 76.2 kg (168 lb)   SpO2 98%   BMI 27.12 kg/m²    ECOG Performance Status: 1 - Symptomatic but completely ambulatory  General Appearance:  alert, cooperative, no apparent distress " and appears stated age   Neurologic/Psychiatric: A&O x 3, gait steady, appropriate affect, strength 5/5 in all muscle groups   HEENT:  Normocephalic, without obvious abnormality, mucous membranes moist   Neck: Supple, symmetrical, trachea midline, no adenopathy;  No thyromegaly, masses, or tenderness   Lungs:   Clear to auscultation bilaterally; respirations regular, even, and unlabored bilaterally   Heart:  Regular rate and rhythm, no murmurs appreciated   Abdomen:   Soft, non-tender, non-distended and no organomegaly   Lymph nodes: No cervical, supraclavicular, inguinal or axillary adenopathy noted   Extremities: Normal, atraumatic; no clubbing, cyanosis, or edema    Skin: No rashes, ulcers, or suspicious lesions noted     No visits with results within 2 Week(s) from this visit.   Latest known visit with results is:   Admission on 11/06/2019, Discharged on 11/07/2019   Component Date Value Ref Range Status   • Case Report 11/06/2019    Final                    Value:Non-gynecologic Cytology                          Case: [id number]                                  Authorizing Provider:  Arnold You           Collected:           11/06/2019 03:22 PM                                 MD [place]                                                               Ordering Location:     [facility]   Received:            11/07/2019 06:54 AM                                 [facility]                                                                  Pathologist:           Cecily Guido MD                                                        Specimens:   1) - Lymph Node, Station 7 TBNA in cyto                                                             2) - Lymph Node, 4L TBNA in cyto                                                                    3) - Lymph Node, 10L TBNA in cyto                                                                   4) - Lymph Node, 11L TBNA in cyto                                                                                              5) - Lung, Left Upper Lobe, RUBIO BAL for cyto                                              • Final Diagnosis 11/06/2019    Final                    Value:This result contains rich text formatting which cannot be displayed here.   • Case Report 11/06/2019    Final                    Value:Surgical Pathology Report                         Case: [id number]                                  Authorizing Provider:  Avelino Barrett           Collected:           11/06/2019 03:58 PM                                 MD [place]                                                               Ordering Location:     [facility]   Received:            11/07/2019 06:53 AM                                 ENDO SUITES                                                                  Pathologist:           Filiberto Silveira MD                                                           Specimen:    Lung, Left Upper Lobe, RUBIO transbronchial bx                                              • Clinical Information 11/06/2019    Final                    Value:This result contains rich text formatting which cannot be displayed here.   • Final Diagnosis 11/06/2019    Final                    Value:This result contains rich text formatting which cannot be displayed here.   • Gross Description 11/06/2019    Final                    Value:This result contains rich text formatting which cannot be displayed here.   • Microscopic Description 11/06/2019    Final                    Value:This result contains rich text formatting which cannot be displayed here.   • Fungus Culture 11/06/2019 No fungus isolated at 2 weeks   Preliminary   • AFB Culture 11/06/2019 No AFB isolated at 2 weeks   Preliminary   • AFB Stain 11/06/2019 No acid fast bacilli seen on concentrated smear   Preliminary   • Respiratory Culture 11/06/2019 Scant growth (1+) Normal Respiratory Marisel   Final    • Gram Stain 11/06/2019 Few (2+) WBCs per low power field   Final   • Gram Stain 11/06/2019 No organisms seen   Final   • WBC 11/07/2019 8.82  3.40 - 10.80 10*3/mm3 Final   • RBC 11/07/2019 4.71  4.14 - 5.80 10*6/mm3 Final   • Hemoglobin 11/07/2019 13.9  13.0 - 17.7 g/dL Final   • Hematocrit 11/07/2019 42.4  37.5 - 51.0 % Final   • MCV 11/07/2019 90.0  79.0 - 97.0 fL Final   • MCH 11/07/2019 29.5  26.6 - 33.0 pg Final   • MCHC 11/07/2019 32.8  31.5 - 35.7 g/dL Final   • RDW 11/07/2019 12.8  12.3 - 15.4 % Final   • RDW-SD 11/07/2019 42.2  37.0 - 54.0 fl Final   • MPV 11/07/2019 9.8  6.0 - 12.0 fL Final   • Platelets 11/07/2019 219  140 - 450 10*3/mm3 Final        Xr Chest 1 View    Result Date: 11/8/2019  Narrative: EXAMINATION: XR CHEST 1 VW-  INDICATION: Postop bronch; R04.2-Hemoptysis.  COMPARISON: Chest radiograph 08/26/2016.  FINDINGS: Single portable chest radiograph was submitted for review. The heart is not enlarged. Left lower lobe postsurgical change is identified. Hazy left upper lobe airspace changes with postsurgical suture identified. No postprocedural pneumothorax identified.         Impression: Left upper lobe postsurgical changes in airspace disease without evidence of postprocedural pneumothorax.  D:  11/07/2019 E:  11/07/2019  This report was finalized on 11/8/2019 9:06 AM by Dr. Gael Doyle MD.      Fl C Arm During Surgery    Result Date: 11/9/2019  Narrative: EXAMINATION: FLUOROSCOPY C-ARM DURING SURGERY-  INDICATION: R04.2-Hemoptysis; bronchoscopy, shortness of breath.  COMPARISON: None.  FINDINGS: 1.24 minutes of fluoroscopy used for bronchoscopy. Please see the procedural report for full details.      Impression: Fluoroscopy performed for bronchoscopy. Please see the procedural report for full details.  D:  11/07/2019 E:  11/08/2019  This report was finalized on 11/9/2019 12:21 PM by Dr. Henrietta Menjivar MD.    (  ASSESSMENT: The patient is a very pleasant 61 y.o. male with metastatic  adenocarcinoma of the lung    PROBLEM LIST:  1. Non-small cell lung cancer originally diagnosed in 2009 status post right upper lobectomy done by Dr. Real in 2009.  2. Left upper lobe lung nodules found on surveillance CT in 2012, status post left upper lobe wedge resection with benign pathology.  3.  Left lung cancer, adenocarcinoma:  A.  Presented as a new left lower lobe lung nodule found on chest x-ray done 6/2016.   B. CT scan of the chest done 6/16/2016 showing interval enlargement of left lung nodules. PET CT showing hypermetabolic activity in the left lower lobe nodule.   C. CT guided biopsy of left lower lobe nodule positive for adenocarcinoma of the lung. [id number], stage Ia.  C. Status post CyberKnife therapy completed 9/29/2016 per Dr. Lesia CHÁVEZ.  Repeated PET scan done on October 2019 revealed increase in size as well as hypermetabolic activity left upper lobe lung nodule with a new hypermetabolic active right supraclavicular lymph node and essentially other stable findings.  E.  Status post bronchoscopy with biopsy done by Dr. You November 6, 2019 revealed adenocarcinoma from level 4 left and suspicious cytology from left upper lobe.  F.  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  No enough tissue to do PDL 1 testing.  G.  We will start carbo Alimta and Keytruda December 30, 2019  4.  Hemoptysis:  A.  Status post venous embolization done by Dr. Camp November 7, 2019  B.  Completed by radiation to left upper lobe mass November 26, 2019  4.  Black lung  5. Sarcoidosis  6. Hypothyroidism    PLAN:  1.  I did go over the molecular testing results with the patient he does have K-yamilex mutation.  I explained to the patient K-yamilex inhibitor has been presented at tumor conference of lung cancer with good response rate however the drug is not FDA approved.  We will starting the study at our site for patients and third line setting.  2.  I discussed the case with   Jose Manuel from pathology his PDL 1 status was not checked secondary to no enough amount of tissue.  3.  The patient will be treated with carbo Alimta and Keytruda once every 3 weeks.  This is in compliance with NCCN guidelines.  4.  The patient can continue Mucinex 600 mg take by mouth twice per day as needed for congestion and cough.   5.  The patient will follow-up with us in 1 week to get started on treatment will do treatment education the same day since he lives far away.  6. He will continue levothyroxine for hypothyroidism.   7.  We discussed potential side effects of immunotherapy including but not limited to immune mediated reactions with thyroiditis, pneumonitis, hepatitis, colitis, rash, and electrolytes abnormalities, fatigue, multiorgan failure, and possibly death.  8. We reviewed the potential side effects of this regimen including fatigue, vomiting and nausea, hair loss, nephropathy, neuropathy, hearing loss, myelosuppression, and risk of infusion reaction.  9.  I will monitor the patient blood work including blood counts kidney function liver function and electrolytes.  10.  He will follow-up with me for cycle #2.    Padmaja Denny MD  11/26/2019

## 2019-12-04 ENCOUNTER — OFFICE VISIT (OUTPATIENT)
Dept: ONCOLOGY | Facility: CLINIC | Age: 64
End: 2019-12-04

## 2019-12-04 ENCOUNTER — EDUCATION (OUTPATIENT)
Dept: ONCOLOGY | Facility: HOSPITAL | Age: 64
End: 2019-12-04

## 2019-12-04 ENCOUNTER — HOSPITAL ENCOUNTER (OUTPATIENT)
Dept: ONCOLOGY | Facility: HOSPITAL | Age: 64
Setting detail: INFUSION SERIES
Discharge: HOME | End: 2019-12-04

## 2019-12-04 ENCOUNTER — DOCUMENTATION (OUTPATIENT)
Dept: NUTRITION | Facility: HOSPITAL | Age: 64
End: 2019-12-04

## 2019-12-04 VITALS
HEART RATE: 65 BPM | DIASTOLIC BLOOD PRESSURE: 91 MMHG | TEMPERATURE: 97.7 F | RESPIRATION RATE: 16 BRPM | BODY MASS INDEX: 27.16 KG/M2 | SYSTOLIC BLOOD PRESSURE: 165 MMHG | OXYGEN SATURATION: 97 % | HEIGHT: 66 IN | WEIGHT: 169 LBS

## 2019-12-04 VITALS
HEART RATE: 94 BPM | BODY MASS INDEX: 27 KG/M2 | RESPIRATION RATE: 16 BRPM | TEMPERATURE: 98.4 F | WEIGHT: 168 LBS | SYSTOLIC BLOOD PRESSURE: 156 MMHG | DIASTOLIC BLOOD PRESSURE: 72 MMHG | HEIGHT: 66 IN

## 2019-12-04 DIAGNOSIS — C34.12 MALIGNANT NEOPLASM OF UPPER LOBE OF LEFT LUNG (HCC): Primary | ICD-10-CM

## 2019-12-04 DIAGNOSIS — C34.32 MALIGNANT NEOPLASM OF LOWER LOBE OF LEFT LUNG (HCC): Primary | ICD-10-CM

## 2019-12-04 LAB
CREAT BLDA-MCNC: 0.9 MG/DL (ref 0.6–1.3)
CREAT SERPL-MCNC: 0.9 MG/DL
ERYTHROCYTE [DISTWIDTH] IN BLOOD BY AUTOMATED COUNT: 14.1 % (ref 12.3–15.4)
HCT VFR BLD AUTO: 41.7 % (ref 37.5–51)
HGB BLD-MCNC: 14.1 G/DL (ref 13–17.7)
LYMPHOCYTES # BLD AUTO: 0.7 10*3/MM3 (ref 0.7–3.1)
LYMPHOCYTES NFR BLD AUTO: 13.8 % (ref 19.6–45.3)
MCH RBC QN AUTO: 31.5 PG (ref 26.6–33)
MCHC RBC AUTO-ENTMCNC: 33.9 G/DL (ref 31.5–35.7)
MCV RBC AUTO: 93.1 FL (ref 79–97)
MONOCYTES # BLD AUTO: 0.4 10*3/MM3 (ref 0.1–0.9)
MONOCYTES NFR BLD AUTO: 7.8 % (ref 5–12)
NEUTROPHILS # BLD AUTO: 4.1 10*3/MM3 (ref 1.7–7)
NEUTROPHILS NFR BLD AUTO: 78.4 % (ref 42.7–76)
PLATELET # BLD AUTO: 211 10*3/MM3 (ref 140–450)
PMV BLD AUTO: 7.4 FL (ref 6–12)
RBC # BLD AUTO: 4.48 10*6/MM3 (ref 4.14–5.8)
WBC NRBC COR # BLD: 5.2 10*3/MM3 (ref 3.4–10.8)

## 2019-12-04 PROCEDURE — 25010000002 PEMETREXED PER 10 MG: Performed by: INTERNAL MEDICINE

## 2019-12-04 PROCEDURE — 82565 ASSAY OF CREATININE: CPT

## 2019-12-04 PROCEDURE — 96375 TX/PRO/DX INJ NEW DRUG ADDON: CPT

## 2019-12-04 PROCEDURE — 96413 CHEMO IV INFUSION 1 HR: CPT

## 2019-12-04 PROCEDURE — 25010000002 PEMBROLIZUMAB 100 MG/4ML SOLUTION 4 ML VIAL: Performed by: INTERNAL MEDICINE

## 2019-12-04 PROCEDURE — 96417 CHEMO IV INFUS EACH ADDL SEQ: CPT

## 2019-12-04 PROCEDURE — 96367 TX/PROPH/DG ADDL SEQ IV INF: CPT

## 2019-12-04 PROCEDURE — 25010000002 FOSAPREPITANT PER 1 MG: Performed by: INTERNAL MEDICINE

## 2019-12-04 PROCEDURE — 25010000002 PALONOSETRON 0.25 MG/5ML SOLUTION PREFILLED SYRINGE: Performed by: INTERNAL MEDICINE

## 2019-12-04 PROCEDURE — 25010000002 PEMETREXED 100 MG RECONSTITUTED SOLUTION 100 MG VIAL: Performed by: INTERNAL MEDICINE

## 2019-12-04 PROCEDURE — 99215 OFFICE O/P EST HI 40 MIN: CPT | Performed by: NURSE PRACTITIONER

## 2019-12-04 PROCEDURE — 96409 CHEMO IV PUSH SNGL DRUG: CPT

## 2019-12-04 PROCEDURE — 96411 CHEMO IV PUSH ADDL DRUG: CPT

## 2019-12-04 PROCEDURE — 25010000002 CARBOPLATIN PER 50 MG: Performed by: INTERNAL MEDICINE

## 2019-12-04 PROCEDURE — 85025 COMPLETE CBC W/AUTO DIFF WBC: CPT | Performed by: INTERNAL MEDICINE

## 2019-12-04 PROCEDURE — 25010000002 DEXAMETHASONE PER 1 MG: Performed by: NURSE PRACTITIONER

## 2019-12-04 PROCEDURE — 96368 THER/DIAG CONCURRENT INF: CPT

## 2019-12-04 RX ORDER — PALONOSETRON 0.05 MG/ML
0.25 INJECTION, SOLUTION INTRAVENOUS ONCE
Status: COMPLETED | OUTPATIENT
Start: 2019-12-04 | End: 2019-12-04

## 2019-12-04 RX ORDER — FOLIC ACID 1 MG/1
1 TABLET ORAL DAILY
Qty: 30 TABLET | Refills: 3 | Status: SHIPPED | OUTPATIENT
Start: 2019-12-04 | End: 2020-02-07 | Stop reason: SDUPTHER

## 2019-12-04 RX ORDER — SODIUM CHLORIDE 9 MG/ML
250 INJECTION, SOLUTION INTRAVENOUS ONCE
Status: DISCONTINUED | OUTPATIENT
Start: 2019-12-04 | End: 2019-12-05 | Stop reason: HOSPADM

## 2019-12-04 RX ORDER — DEXAMETHASONE 4 MG/1
TABLET ORAL
Qty: 6 TABLET | Refills: 3 | Status: SHIPPED | OUTPATIENT
Start: 2019-12-04 | End: 2020-02-07 | Stop reason: SDUPTHER

## 2019-12-04 RX ORDER — ONDANSETRON HYDROCHLORIDE 8 MG/1
8 TABLET, FILM COATED ORAL 3 TIMES DAILY PRN
Qty: 30 TABLET | Refills: 3 | Status: SHIPPED | OUTPATIENT
Start: 2019-12-04 | End: 2020-02-27 | Stop reason: SDUPTHER

## 2019-12-04 RX ADMIN — PALONOSETRON 0.25 MG: 0.25 INJECTION, SOLUTION INTRAVENOUS at 12:18

## 2019-12-04 RX ADMIN — SODIUM CHLORIDE 900 MG: 9 INJECTION, SOLUTION INTRAVENOUS at 13:29

## 2019-12-04 RX ADMIN — SODIUM CHLORIDE 200 MG: 9 INJECTION, SOLUTION INTRAVENOUS at 11:31

## 2019-12-04 RX ADMIN — DEXAMETHASONE SODIUM PHOSPHATE 12 MG: 4 INJECTION, SOLUTION INTRAMUSCULAR; INTRAVENOUS at 13:14

## 2019-12-04 RX ADMIN — SODIUM CHLORIDE 150 MG: 9 INJECTION, SOLUTION INTRAVENOUS at 12:20

## 2019-12-04 RX ADMIN — CARBOPLATIN 570 MG: 10 INJECTION, SOLUTION INTRAVENOUS at 13:52

## 2019-12-04 NOTE — PROGRESS NOTES
CHEMOTHERAPY PREPARATION    David Alexis  [id number]  1955    Chief Complaint: chemo education     History of present illness:  David Alexis is a 64 y.o. year old male who is here today for chemotherapy preparation and needs assessment. The patient has been diagnosed with recurrent adenocarcinoma of the lung and is scheduled to begin treatment with carbo/Alimta/Keytruda.     Oncology History:       Malignant neoplasm of upper lobe of left lung (CMS/HCC)    11/12/2019 Initial Diagnosis     Malignant neoplasm of upper lobe of left lung (CMS/HCC)         11/13/2019 - 11/26/2019 Radiation     Radiation OncologyTreatment Course:  David Alexis received 3000 cGy in 10 fractions to RUBIO tumor via External Beam Radiation - EBRT.            Past Medical History:   Diagnosis Date   • Back pain    • Black lung (CMS/HCC)    • COPD (chronic obstructive pulmonary disease) (CMS/HCC)    • GERD (gastroesophageal reflux disease)    • Hearing loss     WEARS AIDS   • Hypothyroidism    • Kidney stone    • Lung cancer (CMS/HCC)    • Sarcoidosis    • Wears glasses     READING       Past Surgical History:   Procedure Laterality Date   • BRONCHOSCOPY N/A 8/26/2016    Procedure: CHASITY BRONCHOSCOPY WITH PLACEMENT OF FIDUCIALS, EBUS WITH FLOURO;  Surgeon: Tommy You MD;  Location:  [facility];  Service:    • BRONCHOSCOPY  11/06/2019   • BRONCHOSCOPY N/A 11/6/2019    Procedure: Bronchoscopy with endobronchial ultrasound and transbronchial biopsy with fluoroscopy ;  Surgeon: Arnold You MD;  Location:  [facility];  Service: Pulmonary   • COLONOSCOPY  12/18/2007    DID COLOGAURD 2018   • INGUINAL HERNIA REPAIR Bilateral    • INTERVENTIONAL RADIOLOGY PROCEDURE N/A 11/7/2019    Procedure: COIL EMBOLIZATION;  Surgeon: Thomas Gutierrez MD;  Location:  [facility];  Service: Interventional Radiology   • LUNG BIOPSY Left 2016   • LUNG LOBECTOMY Right 2009    RIGHT UPPER LOBECTOMY   • LYMPH NODE  "BIOPSY      NECK   • THORACOTOMY Left     LEFT THORACOTOMY WITH WEDGE RESECTION       MEDICATIONS: The current medication list was reviewed and reconciled.     Allergies:  has No Known Allergies.    Family History   Problem Relation Age of Onset   • Colon cancer Mother    • Other Father    • Lung cancer Brother          Review of Systems   Constitutional: Negative for fatigue, fever and unexpected weight change.   HENT: Negative for congestion, hearing loss, sore throat and trouble swallowing.    Eyes: Negative for visual disturbance.   Respiratory: Positive for cough and wheezing. Negative for shortness of breath.    Cardiovascular: Negative for chest pain and leg swelling.   Gastrointestinal: Negative for abdominal distention, abdominal pain, constipation, diarrhea, nausea and vomiting.   Endocrine: Negative.    Genitourinary: Negative.    Musculoskeletal: Negative for arthralgias, back pain and gait problem.   Skin: Negative.    Allergic/Immunologic: Negative.    Neurological: Negative for dizziness, weakness, numbness and headaches.   Hematological: Negative for adenopathy. Does not bruise/bleed easily.   Psychiatric/Behavioral: Negative.    All other systems reviewed and are negative.      Physical Exam  Vital Signs: /91   Pulse 65   Temp 97.7 °F (36.5 °C) (Temporal)   Resp 16   Ht 167.6 cm (66\")   Wt 76.7 kg (169 lb)   SpO2 97%   BMI 27.28 kg/m²    General Appearance:  alert, cooperative, no apparent distress and appears stated age   Neurologic/Psychiatric: A&O x 3, gait steady, appropriate affect   HEENT:  Normocephalic, without obvious abnormality, mucous membranes moist   Lungs:   Clear to auscultation bilaterally; respirations regular, even, and unlabored bilaterally   Heart:  Regular rate and rhythm, no murmurs appreciated   Extremities: Normal, atraumatic; no clubbing, cyanosis, or edema    Skin: No rashes, lesions, or abnormal coloration noted     ECOG Performance Status: (0) Fully active, " able to carry on all predisease performance without restriction          NEEDS ASSESSMENTS    Genetics  The patient's new diagnosis and family history have been reviewed for genetic counseling needs. A genetic referral is not recommended.     Psychosocial  The patient has completed a PHQ-9 Depression Screening and the Distress Thermometer (DT) today.   PHQ-9 results show 0 (No Depression). The patient scored their distress today as 0 on a scale of 0-10 with 0 being no distress and 10 being extreme distress.   Problems marked by the patient as being an issue for them within the last week include no problems.   Results were reviewed along with psychosocial resources offered by our cancer center. Our oncology social worker will be flagged for a DT score of 4 or above, and a same day call will be made for a score of 9 or 10. A mental health referral is not recommended at this time. The patient is not accepting of a referral to [facility].   Copies of patient's questionnaires will be scanned into EMR for details and further reference.    Barriers to care  A barriers form was also completed by the patient today. We discussed services offered by our facility to help him have adequate access to care. The patient was given the name and card for our Oncology Social Worker, Mily Yusuf. Based upon barriers assessment today, the patient will not require a follow-up call from the  to further discuss needs.   A copy of the barriers form will also be scanned into EMR for details and further reference.     VAD Assessment  The patient and I discussed planned intervenous chemotherapy as well as other IV treatments that are often needed throughout the course of treatment. These may include, but are not limited to blood transfusions, antibiotics, and IV hydration. The vasculature does appear to be adequate for multiple peripheral IVs throughout their treatment course. Discussed risks and benefits of VADs. The  "patient would not like to pursue Port-A-Cath insertion prior to initiation of treatment.     Advance Care Planning   The patient and I discussed advanced care planning, \"Conversations that Matter\".   This service was offered, free of charge, for development of advance directives with a certified ACP facilitator.  The patient does not have an up-to-date advanced directive. This document is not on file with our office. The patient is not interested in an appointment with one of our facilitators to create or update their advanced directives.         Palliative Care  The patient and I discussed palliative care services. Palliative care is not the same as Hospice care. This is specialized medical care for people living with serious illness with the goal of improving quality of life for the patient and their family. [facility] has partnered with [facility] to offer our patients outpatient palliative care early along with their treatment to assist in coordination of care, symptom management, pain management, and medical decision making.  Oncology criteria for palliative care referral is not met at this time. The patient is not interested in a palliative care consultation.     Additional Referral needs  none      CHEMOTHERAPY EDUCATION    Booklets Given: Chemotherapy and You [x]  Eating Hints [x]    Sexuality/Fertility Books []      Chemotherapy/Biotherapy Education Sheets: (list all that apply)  nausea management, acid reflux management, diarrhea management, Cancer resourse contacts information and skin and mouth care                                                                                                                                                                 Chemotherapy Regimen:   Treatment Plans     Name Type Plan dates Plan Provider         Active    OP LUNG Pembrolizumab / Pemetrexed / Carboplatin AUC=5 ONCOLOGY TREATMENT  11/25/2019 - Present Padmaja Denny MD                    TOPICS " EDUCATION PROVIDED COMMENTS   ANEMIA:  role of RBC, cause, s/s, ways to manage, role of transfusion [x]    THROMBOCYTOPENIA:  role of platelet, cause, s/s, ways to prevent bleeding, things to avoid, when to seek help [x]    NEUTROPENIA:  role of WBC, cause, infection precautions, s/s of infection, when to call MD [x]    NUTRITION & APPETITE CHANGES:  importance of maintaining healthy diet & weight, ways to manage to improve intake, dietary consult, exercise regimen [x]    DIARRHEA:  causes, s/s of dehydration, ways to manage, dietary changes, when to call MD [x]    CONSTIPATION:  causes, ways to manage, dietary changes, when to call MD [x]    NAUSEA & VOMITING:  cause, use of antiemetics, dietary changes, when to call MD [x]    MOUTH SORES:  causes, oral care, ways to manage [x]    ALOPECIA:  cause, ways to manage, resources [x]    INFERTILITY & SEXUALITY:  causes, fertility preservation options, sexuality changes, ways to manage, importance of birth control [x]    NERVOUS SYSTEM CHANGES:  causes, s/s, neuropathies, cognitive changes, ways to manage [x]    PAIN:  causes, ways to manage [x] ????   SKIN & NAIL CHANGES:  cause, s/s, ways to manage [x]    ORGAN TOXICITIES:  cause, s/s, need for diagnostic tests, labs, when to notify MD [x]    SURVIVORSHIP:  distress, distress assessment, secondary malignancies, early/late effects, follow-up, social issues, social support [x]    HOME CARE:  use of spill kits, storing of PO chemo, how to manage bodily fluids [x]    MISCELLANEOUS:  drug interactions, administration, vesicant, et [x]        Assessment and Plan:    Diagnoses and all orders for this visit:    Malignant neoplasm of lower lobe of left lung, [place] by CT FNA 2016        This was a 60 minute face-to-face visit with 60 minutes spent in  counseling and coordination of care as documented above.   The patient and I have reviewed their new cancer diagnosis and scheduled treatment plan. Needs assessment was completed  including genetics, psychosocial needs, barriers to care, VAD evaluation, advanced care planning, and palliative care services. Referrals have been ordered as appropriate based upon our evaluation and patient desires.     Chemotherapy teaching was also completed today as documented above. Adequate time was given to answer all questions to his satisfaction. Patient and family are aware of their care team members and contact information if they have questions or problems throughout the treatment course. Needs assessments and education has been completed. The patient is adequately prepared to begin treatment as scheduled.     I reviewed with the patient pre-treatment medications including dexamethasone 4 mg twice a day the day before, the day of, and the day after chemotherapy. He did not get his prescription filled yet, therefore we will give him IV steroids today, 12 mg dexamethasone today.     I reviewed use of Zofran 8 mg every 8 hours as needed for treatment related nausea.    The patient is going to consider advanced care planning.  He was not interested in referral today, however he will think more about this and let us know if he decides to have referral.    We will see the patient back in 3 weeks for cycle #2 of treatment.  Snehal Stallworth, APRN

## 2019-12-04 NOTE — PLAN OF CARE
Outpatient Infusion • [address] [facility] • [address] • [place] [id number] • [phone]      CHEMOTHERAPY EDUCATION SHEET    NAME:  David Alexis      : 1955           DATE: 19    Booklets Given: Chemotherapy and You []  Eating Hints []    Sexuality/Fertility Books []     Chemotherapy/Biotherapy Education Sheets: (list all that apply)  Carbolatin, Pemetrexed, Pembrolizumab                                                                                                                                                                Chemotherapy Regimen:  Carbolatin, Pemetrexed, Pembrolizumab Q21 days    TOPICS EDUCATION PROVIDED EDUCATION REINFORCED COMMENTS   ANEMIA:  role of RBC, cause, s/s, ways to manage, role of transfusion [x] [] Counseled patient on role of rbc and resulting fatigue from treatment   THROMBOCYTOPENIA:  role of platelet, cause, s/s, ways to prevent bleeding, things to avoid, when to seek help [x] [] Counseled patient on role of platelets and increased risk for bleeding and bruising   NEUTROPENIA:  role of WBC, cause, infection precautions, s/s of infection, when to call MD [x] [] Counseled patient on role of WBC, patient and wife have received flu shot, also counseled on strategies to avoid infection   NUTRITION & APPETITE CHANGES:  importance of maintaining healthy diet & weight, ways to manage to improve intake, dietary consult, exercise regimen [x] [] Counseled patient to continue eating balanced meals through treatment    DIARRHEA:  causes, s/s of dehydration, ways to manage, dietary changes, when to call MD [x] [] Counseled patient to use loperamide for loose stools   CONSTIPATION:  causes, ways to manage, dietary changes, when to call MD [] []    NAUSEA & VOMITING:  cause, use of antiemetics, dietary changes, when to call MD [x] [] Counseled patient on proactively taking ondansetron TID as needed for nausea prevention   MOUTH SORES:  causes, oral care, ways to manage []  []    ALOPECIA:  cause, ways to manage, resources [x] [] Counseled patient on potential hair loss from treatment   INFERTILITY & SEXUALITY:  causes, fertility preservation options, sexuality changes, ways to manage, importance of birth control [] []    NERVOUS SYSTEM CHANGES:  causes, s/s, neuropathies, cognitive changes, ways to manage [] []    PAIN:  causes, ways to manage [] [] ????   SKIN & NAIL CHANGES:  cause, s/s, ways to manage [x] [] Counseled patient on potential rash from targeted therapies    ORGAN TOXICITIES:  cause, s/s, need for diagnostic tests, labs, when to notify MD [x] [] Counseled patient that medication may inflame key organs (liver, thyroid, colon). Reassured him we will track labs prior to treatments   SURVIVORSHIP:  distress, distress assessment, secondary malignancies, early/late effects, follow-up, social issues, social support [] []    HOME CARE:  use of spill kits, storing of PO chemo, how to manage bodily fluids [] []    MISCELLANEOUS:  drug interactions, administration, vesicant, et [x] [] Counseled patient on what a port is and why it may be beneficial for him in the future     Referrals:       Notes:   Met with David and his wife to discuss the treatments he was receiving. Patient didn't ask many questions but notified me that was because he remembered what I was telling him at his previous visit. Met for approximately 20 minutes.

## 2019-12-05 NOTE — PROGRESS NOTES
Onc Nutrition     Patient:  David Alexis  YOB: 1955    Diagnosis: metastatic adenocarcinoma of the lung  Treatment:  Carbo / Alimta / Keytruda - every 21 days    Weight: 168# - stable  Nutrition Symptoms: none     Met with patient and his wife during his chemotherapy infusion appointment.  Note patient has recently completed radiation treatments and was seen by my colleague, Ana Gallardo RD.       Reviewed the importance of good nutrition during his treatment course.  Advised him to be eating smaller more frequent meals/snacks throughout the day with an emphasis on high protein foods and adequate hydration.     Answered their questions and both voiced understanding of information discussed.  Encouraged them to call RD with questions.  Will monitor as needed during his treatment course.    Henrietta Thomas RD  12/05/19

## 2019-12-10 ENCOUNTER — OFFICE VISIT (OUTPATIENT)
Dept: PULMONOLOGY | Facility: CLINIC | Age: 64
End: 2019-12-10

## 2019-12-10 VITALS
DIASTOLIC BLOOD PRESSURE: 82 MMHG | HEIGHT: 66 IN | HEART RATE: 65 BPM | WEIGHT: 174 LBS | OXYGEN SATURATION: 94 % | BODY MASS INDEX: 27.97 KG/M2 | SYSTOLIC BLOOD PRESSURE: 128 MMHG | RESPIRATION RATE: 16 BRPM

## 2019-12-10 DIAGNOSIS — R04.2 HEMOPTYSIS: Primary | ICD-10-CM

## 2019-12-10 PROCEDURE — 99213 OFFICE O/P EST LOW 20 MIN: CPT | Performed by: INTERNAL MEDICINE

## 2019-12-10 NOTE — PROGRESS NOTES
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  Patient was diagnosed with RUL NSCLC and had a RUL Lobectomy in 2009, presumably early stage - did not require chemo / RT.  He then had a new nodule in 2012 and had a RUBIO wedge resection that showed CWP.  In 2016 patient had a new LLL nodule with CT guided biopsy + for NSCLC and he underwent SBRT.  At this same time he had EBUS of his mediastinal LN's showing anthracotic pigment and non-necrotizing granulomas.  Had been told he had sarcoid after original lung cancer surgery in 2009 (those path reports are at [facility]).  He was never treated with steroids, and never had any evaluation to look for extra-pulmonary sarcoidosis.    He comes in today for 1 month of hemoptysis.  Coughing up a spoonful at a time, several times a day.  No trouble breathing - able to go hiking several miles.  No fever, chills, nightsweats, weight loss, etc.  He just a PET done 10/25/19 that shows stable nodules and mediastinal lymph nodes (c/w scar and CWP) but a new extensive reticular and GG infiltrate in the RUBIO.  There was also an enlarging metabolically active right supraclavicular lymph node that was new from PET 6/10/19.      Had bronch w/ TBBx and EBUS on 11/6.  Tissue path in RUBIO c/w coal workers pneumoconiosis,  However 4L LN was + for NSCLC favor adeno, and the BAL in URBIO was suspicious (\"a rare malignant cell is seen in background of bronchial cells and macrophages\").  He was found to have blood clots coming from the RUBIO during bronch with no endobronchial lesion.  He underwent Bronchial Artery Embolization of the RUBIO with Dr. Gutierrez on 11/7.  He continued to have hemoptysis and had palliative radiation.    INTERVAL HISTORY:    Patient " returns for follow up had some radiation and one round of chemo.  Hemoptysis is now minimal.  Has a slight amount in morning then typically no more.    PMH:    Past Medical History:   Diagnosis Date   • Back pain    • Black lung (CMS/HCC)    • COPD (chronic obstructive pulmonary disease) (CMS/HCC)    • GERD (gastroesophageal reflux disease)    • Hearing loss     WEARS AIDS   • Hypothyroidism    • Kidney stone    • Lung cancer (CMS/HCC)    • Sarcoidosis    • Wears glasses     READING     PSH:    Past Surgical History:   Procedure Laterality Date   • BRONCHOSCOPY N/A 8/26/2016    Procedure: CHASITY BRONCHOSCOPY WITH PLACEMENT OF FIDUCIALS, EBUS WITH FLOURO;  Surgeon: Tommy You MD;  Location:  [facility];  Service:    • BRONCHOSCOPY  11/06/2019   • BRONCHOSCOPY N/A 11/6/2019    Procedure: Bronchoscopy with endobronchial ultrasound and transbronchial biopsy with fluoroscopy ;  Surgeon: Arnold You MD;  Location:  [facility];  Service: Pulmonary   • COLONOSCOPY  12/18/2007    DID COLOGAURD 2018   • INGUINAL HERNIA REPAIR Bilateral    • INTERVENTIONAL RADIOLOGY PROCEDURE N/A 11/7/2019    Procedure: COIL EMBOLIZATION;  Surgeon: Thomas Gutierrez MD;  Location:  [facility] CATH INVASIVE LOCATION;  Service: Interventional Radiology   • LUNG BIOPSY Left 2016   • LUNG LOBECTOMY Right 2009    RIGHT UPPER LOBECTOMY   • LYMPH NODE BIOPSY      NECK   • THORACOTOMY Left     LEFT THORACOTOMY WITH WEDGE RESECTION     FH:    Family History   Problem Relation Age of Onset   • Colon cancer Mother    • Other Father    • Lung cancer Brother      SH:    Social History     Socioeconomic History   • Marital status:      Spouse name: Not on file   • Number of children: 0   • Years of education: Not on file   • Highest education level: Not on file   Occupational History     Employer: DISABLED   Tobacco Use   • Smoking status: Former Smoker     Packs/day: 1.50     Years: 25.00     Pack years: 37.50      Types: Cigarettes     Last attempt to quit: 2006     Years since quittin.3   • Smokeless tobacco: Never Used   Substance and Sexual Activity   • Alcohol use: No   • Drug use: No   • Sexual activity: Yes     Partners: Female     Comment:     Social History Narrative        Previously worked in the [employer].    Smoked 1-1/2ppd for 25+ years, none since     No ETOh or illicit drugs     ALLERGIES:    No Known Allergies  MEDICATIONS:      Current Outpatient Medications:   •  albuterol (PROVENTIL HFA;VENTOLIN HFA) 108 (90 BASE) MCG/ACT inhaler, Inhale 2 puffs every 4 (four) hours as needed for wheezing or shortness of air., Disp: , Rfl:   •  dexamethasone (DECADRON) 4 MG tablet, Take 1 tablet twice daily starting the day before chemo, day of chemo, and day after chemo.  Take with food., Disp: 6 tablet, Rfl: 3  •  folic acid (FOLVITE) 1 MG tablet, Take 1 tablet by mouth Daily. Start at least 7 days prior to chemotherapy until at least 3 weeks after all chemotherapy., Disp: 30 tablet, Rfl: 3  •  guaiFENesin (MUCINEX) 600 MG 12 hr tablet, Take 1 tablet by mouth 2 (Two) Times a Day., Disp: 60 tablet, Rfl: 5  •  HYDROcodone-acetaminophen (NORCO) 7.5-325 MG per tablet, Take 1 tablet by mouth every 6 (six) hours as needed for moderate pain (4-6)., Disp: , Rfl:   •  levocetirizine (XYZAL) 5 MG tablet, Take 5 mg by mouth Every Evening., Disp: , Rfl:   •  levothyroxine (SYNTHROID, LEVOTHROID) 75 MCG tablet, Take 75 mcg by mouth Daily., Disp: , Rfl:   •  omeprazole (priLOSEC) 40 MG capsule, Take 40 mg by mouth Daily., Disp: , Rfl:   •  ondansetron (ZOFRAN) 8 MG tablet, Take 1 tablet by mouth 3 (Three) Times a Day As Needed for Nausea or Vomiting., Disp: 30 tablet, Rfl: 3  •  SYMBICORT 160-4.5 MCG/ACT inhaler, INL 2 PUFFS PO BID, Disp: , Rfl: 3  •  tamsulosin (FLOMAX) 0.4 MG capsule 24 hr capsule, Take 1 capsule by mouth Daily., Disp: 30 capsule, Rfl: 6  ROS:  Per HPI, otherwise all systems  reviewed and negative.    DIAGNOSTIC DATA (Reviewed and interpreted by me unless otherwise specified):    PET CT 10/25/19 - extensive reticular and GG infiltrate in RUBIO, other areas of CWP / scar stable from prior PET 6/10/19 with exception of right SC LN    Vitals:    12/10/19 1050   BP: 128/82   Pulse: 65   Resp: 16   SpO2: 94%       Physical Exam   Constitutional: Oriented to person, place, and time. Appears well-developed and well-nourished.   Head: Normocephalic and atraumatic.   Nose: Nose normal.   Mouth/Throat: Oropharynx is clear and moist.   Eyes: Conjunctivae are normal.  Pupils normal.  Neck: No tracheal deviation present.   Cardiovascular: Normal rate, regular rhythm, normal heart sounds and intact distal pulses.  Exam reveals no gallop and no friction rub.  No thrill.  No JVD.  No edema.  No murmur heard.  Pulmonary/Chest: Effort normal and breath sounds normal.  No tenderness to palpation.  No clubbing.   Abdominal: Soft. Bowel sounds are normal. No distension. No tenderness. There is no guarding.   Musculoskeletal: Normal range of motion.  No tenderness.  Lymphadenopathy:  No cervical adenopathy.   Neurological:  No new focal neurological deficits observed   Skin: Skin is warm and dry. No rash noted.   Psychiatric: Normal mood and affect.  Behavior is normal. Judgment normal.    Assessment/Plan     1)  Abnormal Imaging / Hemoptysis - first of all I do no think he has sarcoidosis.  I think he has some granulomatous inflammation related to coal workers pneumoconiosis that does not need any further evaluation / treatment.  CWP is unfortunately PET positive, but he has also had 2 separate early stage lung cancers.  Bronch on 11/6 showed blood clots emanating from the RUBIO.  Underwent bronchial artery embolization to that area.  Pathology from bronch shows malignancy in 4L lymph node - NSCLC - favor adeno, and suspicious BAL RUBIO.  Patient continued to have hemoptysis and then underwent palliative  radiation.  His hemoptysis is now minimal.    At this point I don't think we need to do anything else for his hemoptysis.  If it worsens we could consider Pulmonary Artery embolization.  Surgery would be a last resort and only if life threatening.      RTC 3 months    Arnold You MD  Pulmonology and Critical Care Medicine  12/10/19 11:57 AM  Electronically Signed    C.C.:  Padmaja Denny MD, Michael Schreiber MD

## 2019-12-18 LAB
FUNGUS WND CULT: NORMAL
MYCOBACTERIUM SPEC CULT: NORMAL
NIGHT BLUE STAIN TISS: NORMAL

## 2019-12-26 DIAGNOSIS — C34.12 MALIGNANT NEOPLASM OF UPPER LOBE OF LEFT LUNG (HCC): ICD-10-CM

## 2019-12-27 ENCOUNTER — OFFICE VISIT (OUTPATIENT)
Dept: ONCOLOGY | Facility: CLINIC | Age: 64
End: 2019-12-27

## 2019-12-27 ENCOUNTER — HOSPITAL ENCOUNTER (OUTPATIENT)
Dept: ONCOLOGY | Facility: HOSPITAL | Age: 64
Setting detail: INFUSION SERIES
Discharge: HOME | End: 2019-12-27

## 2019-12-27 VITALS
TEMPERATURE: 98.6 F | HEIGHT: 66 IN | DIASTOLIC BLOOD PRESSURE: 66 MMHG | OXYGEN SATURATION: 95 % | RESPIRATION RATE: 18 BRPM | SYSTOLIC BLOOD PRESSURE: 154 MMHG | BODY MASS INDEX: 28.61 KG/M2 | WEIGHT: 178 LBS | HEART RATE: 82 BPM

## 2019-12-27 DIAGNOSIS — C34.12 MALIGNANT NEOPLASM OF UPPER LOBE OF LEFT LUNG (HCC): Primary | ICD-10-CM

## 2019-12-27 DIAGNOSIS — C34.32 MALIGNANT NEOPLASM OF LOWER LOBE OF LEFT LUNG (HCC): Primary | ICD-10-CM

## 2019-12-27 LAB
ALBUMIN SERPL-MCNC: 4.2 G/DL (ref 3.5–5.2)
ALBUMIN/GLOB SERPL: 1.3 G/DL
ALP SERPL-CCNC: 106 U/L (ref 39–117)
ALT SERPL W P-5'-P-CCNC: 26 U/L (ref 1–41)
ANION GAP SERPL CALCULATED.3IONS-SCNC: 13 MMOL/L (ref 5–15)
AST SERPL-CCNC: 27 U/L (ref 1–40)
BILIRUB SERPL-MCNC: 0.2 MG/DL (ref 0.2–1.2)
BUN BLD-MCNC: 11 MG/DL (ref 8–23)
BUN/CREAT SERPL: 14.1 (ref 7–25)
CALCIUM SPEC-SCNC: 9.3 MG/DL (ref 8.6–10.5)
CHLORIDE SERPL-SCNC: 103 MMOL/L (ref 98–107)
CO2 SERPL-SCNC: 25 MMOL/L (ref 22–29)
CREAT BLD-MCNC: 0.78 MG/DL (ref 0.76–1.27)
CREAT BLDA-MCNC: 0.8 MG/DL (ref 0.6–1.3)
CREAT SERPL-MCNC: 0.8 MG/DL
ERYTHROCYTE [DISTWIDTH] IN BLOOD BY AUTOMATED COUNT: 16.1 % (ref 12.3–15.4)
GFR SERPL CREATININE-BSD FRML MDRD: 100 ML/MIN/1.73
GLOBULIN UR ELPH-MCNC: 3.3 GM/DL
GLUCOSE BLD-MCNC: 146 MG/DL (ref 65–99)
HCT VFR BLD AUTO: 40.4 % (ref 37.5–51)
HGB BLD-MCNC: 13.4 G/DL (ref 13–17.7)
LYMPHOCYTES # BLD AUTO: 0.5 10*3/MM3 (ref 0.7–3.1)
LYMPHOCYTES NFR BLD AUTO: 6.9 % (ref 19.6–45.3)
MCH RBC QN AUTO: 31.1 PG (ref 26.6–33)
MCHC RBC AUTO-ENTMCNC: 33.2 G/DL (ref 31.5–35.7)
MCV RBC AUTO: 93.6 FL (ref 79–97)
MONOCYTES # BLD AUTO: 0.4 10*3/MM3 (ref 0.1–0.9)
MONOCYTES NFR BLD AUTO: 5.9 % (ref 5–12)
NEUTROPHILS # BLD AUTO: 6.5 10*3/MM3 (ref 1.7–7)
NEUTROPHILS NFR BLD AUTO: 87.2 % (ref 42.7–76)
PLATELET # BLD AUTO: 305 10*3/MM3 (ref 140–450)
PMV BLD AUTO: 6.4 FL (ref 6–12)
POTASSIUM BLD-SCNC: 3.4 MMOL/L (ref 3.5–5.2)
PROT SERPL-MCNC: 7.5 G/DL (ref 6–8.5)
RBC # BLD AUTO: 4.32 10*6/MM3 (ref 4.14–5.8)
SODIUM BLD-SCNC: 141 MMOL/L (ref 136–145)
WBC NRBC COR # BLD: 7.5 10*3/MM3 (ref 3.4–10.8)

## 2019-12-27 PROCEDURE — 82565 ASSAY OF CREATININE: CPT

## 2019-12-27 PROCEDURE — 96411 CHEMO IV PUSH ADDL DRUG: CPT

## 2019-12-27 PROCEDURE — 96375 TX/PRO/DX INJ NEW DRUG ADDON: CPT

## 2019-12-27 PROCEDURE — 25010000002 PEMBROLIZUMAB 100 MG/4ML SOLUTION 4 ML VIAL: Performed by: INTERNAL MEDICINE

## 2019-12-27 PROCEDURE — 85025 COMPLETE CBC W/AUTO DIFF WBC: CPT | Performed by: INTERNAL MEDICINE

## 2019-12-27 PROCEDURE — 96367 TX/PROPH/DG ADDL SEQ IV INF: CPT

## 2019-12-27 PROCEDURE — 25010000002 PEMETREXED PER 10 MG: Performed by: INTERNAL MEDICINE

## 2019-12-27 PROCEDURE — 25010000002 PALONOSETRON 0.25 MG/5ML SOLUTION PREFILLED SYRINGE: Performed by: INTERNAL MEDICINE

## 2019-12-27 PROCEDURE — 96415 CHEMO IV INFUSION ADDL HR: CPT

## 2019-12-27 PROCEDURE — 96413 CHEMO IV INFUSION 1 HR: CPT

## 2019-12-27 PROCEDURE — 25010000002 FOSAPREPITANT PER 1 MG: Performed by: INTERNAL MEDICINE

## 2019-12-27 PROCEDURE — 99215 OFFICE O/P EST HI 40 MIN: CPT | Performed by: INTERNAL MEDICINE

## 2019-12-27 PROCEDURE — 96366 THER/PROPH/DIAG IV INF ADDON: CPT

## 2019-12-27 PROCEDURE — 96417 CHEMO IV INFUS EACH ADDL SEQ: CPT

## 2019-12-27 PROCEDURE — 25010000002 CARBOPLATIN PER 50 MG: Performed by: INTERNAL MEDICINE

## 2019-12-27 PROCEDURE — 80053 COMPREHEN METABOLIC PANEL: CPT | Performed by: INTERNAL MEDICINE

## 2019-12-27 PROCEDURE — 25010000002 PEMETREXED 100 MG RECONSTITUTED SOLUTION 100 MG VIAL: Performed by: INTERNAL MEDICINE

## 2019-12-27 RX ORDER — SODIUM CHLORIDE 9 MG/ML
250 INJECTION, SOLUTION INTRAVENOUS ONCE
Status: CANCELLED | OUTPATIENT
Start: 2019-12-27

## 2019-12-27 RX ORDER — PALONOSETRON 0.05 MG/ML
0.25 INJECTION, SOLUTION INTRAVENOUS ONCE
Status: COMPLETED | OUTPATIENT
Start: 2019-12-27 | End: 2019-12-27

## 2019-12-27 RX ORDER — SODIUM CHLORIDE 9 MG/ML
250 INJECTION, SOLUTION INTRAVENOUS ONCE
Status: COMPLETED | OUTPATIENT
Start: 2019-12-27 | End: 2019-12-27

## 2019-12-27 RX ORDER — FAMOTIDINE 10 MG/ML
20 INJECTION, SOLUTION INTRAVENOUS AS NEEDED
Status: CANCELLED | OUTPATIENT
Start: 2019-12-27

## 2019-12-27 RX ORDER — PALONOSETRON 0.05 MG/ML
0.25 INJECTION, SOLUTION INTRAVENOUS ONCE
Status: CANCELLED | OUTPATIENT
Start: 2019-12-27

## 2019-12-27 RX ORDER — DIPHENHYDRAMINE HYDROCHLORIDE 50 MG/ML
50 INJECTION INTRAMUSCULAR; INTRAVENOUS AS NEEDED
Status: CANCELLED | OUTPATIENT
Start: 2019-12-27

## 2019-12-27 RX ADMIN — SODIUM CHLORIDE 250 ML: 9 INJECTION, SOLUTION INTRAVENOUS at 10:30

## 2019-12-27 RX ADMIN — PALONOSETRON 0.25 MG: 0.25 INJECTION, SOLUTION INTRAVENOUS at 10:28

## 2019-12-27 RX ADMIN — SODIUM CHLORIDE 200 MG: 9 INJECTION, SOLUTION INTRAVENOUS at 10:54

## 2019-12-27 RX ADMIN — SODIUM CHLORIDE 150 MG: 9 INJECTION, SOLUTION INTRAVENOUS at 10:31

## 2019-12-27 RX ADMIN — SODIUM CHLORIDE 900 MG: 9 INJECTION, SOLUTION INTRAVENOUS at 11:35

## 2019-12-27 RX ADMIN — CARBOPLATIN 650 MG: 10 INJECTION, SOLUTION INTRAVENOUS at 11:52

## 2019-12-27 NOTE — PROGRESS NOTES
DATE OF VISIT: 12/27/2019    REASON FOR VISIT: Recurrent adenocarcinoma of the lung       HISTORY OF PRESENT ILLNESS: The patient is a very pleasant 64 y.o. male  with past medical history significant for lung cancer diagnosed 6/22/2016 . 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 PET scan done 6/22/2016 showed findings consistent with recurrent neoplasm in the left upper lobe, with metastatic adenopathy to the left hilum and AP window as well as a metastatic nodule in the left lower lobe. CT-guided biopsy of the left lower lobe nodule was performed that showed adenocarcinoma of the lung. The patient underwent CyberKnife therapy by Dr Pinzon completed 9/29/2016.  Repeat scan showed gradual increase in size of left upper lobe lung infiltrates.  Patient had whole-body PET scan that revealed increased metabolic activity in the left lung infiltrate as well as a new right supraclavicular lymph nodes.  Patient had bronchoscopy with biopsy done by Dr. You on November 6, 2019 that revealed adenocarcinoma and level 4 left mediastinal lymph node.  He received palliative course of radiation to the left upper lobe secondary to persistent hemoptysis followed by palliative treatment with carboplatin Alimta and Keytruda started December 4, 2019.  The patient is here today for scheduled follow up visit with treatment cycle #2.     SUBJECTIVE: The patient is here today with his wife.  He was able to tolerate treatment fairly well but he has nausea but no vomiting.  He has been complaining of upper airway symptoms currently he is on Z-Braden.    PAST MEDICAL HISTORY/SOCIAL HISTORY/FAMILY HISTORY: Unchanged from my prior documentation done on 08/02/2016.     Review of Systems   Constitutional: Negative for activity change, appetite change, chills, fatigue, fever and unexpected weight change.   HENT: Negative for hearing loss, mouth sores,  nosebleeds, sore throat and trouble swallowing.    Eyes: Negative for visual disturbance.   Respiratory: Positive for shortness of breath and wheezing. Negative for cough and chest tightness.         With activity   Cardiovascular: Negative for chest pain, palpitations and leg swelling.   Gastrointestinal: Negative for abdominal distention, abdominal pain, blood in stool, constipation, diarrhea, nausea, rectal pain and vomiting.   Endocrine: Negative for cold intolerance and heat intolerance.   Genitourinary: Negative for difficulty urinating, dysuria, frequency and urgency.   Musculoskeletal: Negative for arthralgias, back pain, gait problem, joint swelling and myalgias.   Skin: Negative for rash.   Neurological: Negative for dizziness, tremors, syncope, weakness, light-headedness, numbness and headaches.   Hematological: Negative for adenopathy. Does not bruise/bleed easily.   Psychiatric/Behavioral: Negative for confusion, sleep disturbance and suicidal ideas. The patient is not nervous/anxious.          Current Outpatient Medications:   •  albuterol (PROVENTIL HFA;VENTOLIN HFA) 108 (90 BASE) MCG/ACT inhaler, Inhale 2 puffs every 4 (four) hours as needed for wheezing or shortness of air., Disp: , Rfl:   •  dexamethasone (DECADRON) 4 MG tablet, Take 1 tablet twice daily starting the day before chemo, day of chemo, and day after chemo.  Take with food., Disp: 6 tablet, Rfl: 3  •  folic acid (FOLVITE) 1 MG tablet, Take 1 tablet by mouth Daily. Start at least 7 days prior to chemotherapy until at least 3 weeks after all chemotherapy., Disp: 30 tablet, Rfl: 3  •  guaiFENesin (MUCINEX) 600 MG 12 hr tablet, Take 1 tablet by mouth 2 (Two) Times a Day., Disp: 60 tablet, Rfl: 5  •  HYDROcodone-acetaminophen (NORCO) 7.5-325 MG per tablet, Take 1 tablet by mouth every 6 (six) hours as needed for moderate pain (4-6)., Disp: , Rfl:   •  levocetirizine (XYZAL) 5 MG tablet, Take 5 mg by mouth Every Evening., Disp: , Rfl:   •   "levothyroxine (SYNTHROID, LEVOTHROID) 75 MCG tablet, Take 75 mcg by mouth Daily., Disp: , Rfl:   •  omeprazole (priLOSEC) 40 MG capsule, Take 40 mg by mouth Daily., Disp: , Rfl:   •  ondansetron (ZOFRAN) 8 MG tablet, Take 1 tablet by mouth 3 (Three) Times a Day As Needed for Nausea or Vomiting., Disp: 30 tablet, Rfl: 3  •  SYMBICORT 160-4.5 MCG/ACT inhaler, INL 2 PUFFS PO BID, Disp: , Rfl: 3  •  tamsulosin (FLOMAX) 0.4 MG capsule 24 hr capsule, Take 1 capsule by mouth Daily., Disp: 30 capsule, Rfl: 6    PHYSICAL EXAMINATION:   /66   Pulse 82   Temp 98.6 °F (37 °C)   Resp 18   Ht 167.6 cm (66\")   Wt 80.7 kg (178 lb)   SpO2 95%   BMI 28.73 kg/m²    ECOG Performance Status: 1 - Symptomatic but completely ambulatory  General Appearance:  alert, cooperative, no apparent distress and appears stated age   Neurologic/Psychiatric: A&O x 3, gait steady, appropriate affect, strength 5/5 in all muscle groups   HEENT:  Normocephalic, without obvious abnormality, mucous membranes moist   Neck: Supple, symmetrical, trachea midline, no adenopathy;  No thyromegaly, masses, or tenderness   Lungs:   Clear to auscultation bilaterally; respirations regular, even, and unlabored bilaterally   Heart:  Regular rate and rhythm, no murmurs appreciated   Abdomen:   Soft, non-tender, non-distended and no organomegaly   Lymph nodes: No cervical, supraclavicular, inguinal or axillary adenopathy noted   Extremities: Normal, atraumatic; no clubbing, cyanosis, or edema    Skin: No rashes, ulcers, or suspicious lesions noted     No visits with results within 2 Week(s) from this visit.   Latest known visit with results is:   Hospital Outpatient Visit on 12/04/2019   Component Date Value Ref Range Status   • WBC 12/04/2019 5.20  3.40 - 10.80 10*3/mm3 Final   • RBC 12/04/2019 4.48  4.14 - 5.80 10*6/mm3 Final   • Hemoglobin 12/04/2019 14.1  13.0 - 17.7 g/dL Final   • Hematocrit 12/04/2019 41.7  37.5 - 51.0 % Final   • RDW 12/04/2019 14.1  " 12.3 - 15.4 % Final   • MCV 12/04/2019 93.1  79.0 - 97.0 fL Final   • MCH 12/04/2019 31.5  26.6 - 33.0 pg Final   • MCHC 12/04/2019 33.9  31.5 - 35.7 g/dL Final   • MPV 12/04/2019 7.4  6.0 - 12.0 fL Final   • Platelets 12/04/2019 211  140 - 450 10*3/mm3 Final   • Neutrophil % 12/04/2019 78.4* 42.7 - 76.0 % Final   • Lymphocyte % 12/04/2019 13.8* 19.6 - 45.3 % Final   • Monocyte % 12/04/2019 7.8  5.0 - 12.0 % Final   • Neutrophils, Absolute 12/04/2019 4.10  1.70 - 7.00 10*3/mm3 Final   • Lymphocytes, Absolute 12/04/2019 0.70  0.70 - 3.10 10*3/mm3 Final   • Monocytes, Absolute 12/04/2019 0.40  0.10 - 0.90 10*3/mm3 Final   • Creatinine 12/04/2019 0.9  mg/dL Final   • Creatinine 12/04/2019 0.90  0.60 - 1.30 mg/dL Final    Serial Number: [id number]:  [id number]        No results found.(  ASSESSMENT: The patient is a very pleasant 61 y.o. male with metastatic adenocarcinoma of the lung    PROBLEM LIST:  1. Non-small cell lung cancer originally diagnosed in 2009 status post right upper lobectomy done by Dr. Real in 2009.  2. Left upper lobe lung nodules found on surveillance CT in 2012, status post left upper lobe wedge resection with benign pathology.  3.  Left lung cancer, adenocarcinoma:  A.  Presented as a new left lower lobe lung nodule found on chest x-ray done 6/2016.   B. CT scan of the chest done 6/16/2016 showing interval enlargement of left lung nodules. PET CT showing hypermetabolic activity in the left lower lobe nodule.   C. CT guided biopsy of left lower lobe nodule positive for adenocarcinoma of the lung. [id number], stage Ia.  C. Status post CyberKnife therapy completed 9/29/2016 per Dr. Lesia CHÁVEZ.  Repeated PET scan done on October 2019 revealed increase in size as well as hypermetabolic activity left upper lobe lung nodule with a new hypermetabolic active right supraclavicular lymph node and essentially other stable findings.  E.  Status post bronchoscopy with biopsy done by Dr. You November 6,  2019 revealed adenocarcinoma from level 4 left and suspicious cytology from left upper lobe.  F.  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  No enough tissue to do PDL 1 testing.  G.  Started carbo Alimta and Keytruda December 04, 2019, status post 1 cycle  4.  Hemoptysis:  A.  Status post venous embolization done by Dr. Camp November 7, 2019  B.  Completed by radiation to left upper lobe mass November 26, 2019  4.  Black lung  5. Sarcoidosis  6. Hypothyroidism  7.  Upper airway infection  8.  Treatment induced nausea    PLAN:  1.  I we will proceed with treatment using carbo Alimta and Keytruda once every 3 weeks, cycle #2.  This is in compliance with NCCN guidelines.  2.  The patient can continue Mucinex 600 mg take by mouth twice per day as needed for congestion and cough.   3.  The patient will follow-up with us in 3 weeks with cycle #3.  4. He will continue levothyroxine for hypothyroidism.   5.  We discussed potential side effects of immunotherapy including but not limited to immune mediated reactions with thyroiditis, pneumonitis, hepatitis, colitis, rash, and electrolytes abnormalities, fatigue, multiorgan failure, and possibly death.  6. We reviewed the potential side effects of this regimen including fatigue, vomiting and nausea, hair loss, nephropathy, neuropathy, hearing loss, myelosuppression, and risk of infusion reaction.  7.  I will monitor the patient blood work including blood counts kidney function liver function and electrolytes.  8.  I will repeat patient scans prior to cycle #4.  9.  We will continue Zofran as needed for chemotherapy-induced nausea.  10.  We will continue folic acid vitamin B12 and Decadron.  On the protocol.  11.  Patient will complete his Z-Braden course for upper airway infection.  Padmaja Denny MD  12/27/2019

## 2020-01-16 DIAGNOSIS — C34.12 MALIGNANT NEOPLASM OF UPPER LOBE OF LEFT LUNG (HCC): ICD-10-CM

## 2020-01-17 ENCOUNTER — HOSPITAL ENCOUNTER (OUTPATIENT)
Dept: ONCOLOGY | Facility: HOSPITAL | Age: 65
Setting detail: INFUSION SERIES
Discharge: HOME | End: 2020-01-17

## 2020-01-17 ENCOUNTER — OFFICE VISIT (OUTPATIENT)
Dept: ONCOLOGY | Facility: CLINIC | Age: 65
End: 2020-01-17

## 2020-01-17 VITALS
HEART RATE: 75 BPM | WEIGHT: 180 LBS | TEMPERATURE: 98.1 F | SYSTOLIC BLOOD PRESSURE: 141 MMHG | DIASTOLIC BLOOD PRESSURE: 74 MMHG | OXYGEN SATURATION: 97 % | BODY MASS INDEX: 28.93 KG/M2 | HEIGHT: 66 IN | RESPIRATION RATE: 16 BRPM

## 2020-01-17 DIAGNOSIS — C34.12 MALIGNANT NEOPLASM OF UPPER LOBE OF LEFT LUNG (HCC): Primary | ICD-10-CM

## 2020-01-17 DIAGNOSIS — C34.32 MALIGNANT NEOPLASM OF LOWER LOBE OF LEFT LUNG (HCC): Primary | ICD-10-CM

## 2020-01-17 LAB
ALBUMIN SERPL-MCNC: 4.3 G/DL (ref 3.5–5.2)
ALBUMIN/GLOB SERPL: 1.3 G/DL
ALP SERPL-CCNC: 107 U/L (ref 39–117)
ALT SERPL W P-5'-P-CCNC: 21 U/L (ref 1–41)
ANION GAP SERPL CALCULATED.3IONS-SCNC: 13 MMOL/L (ref 5–15)
AST SERPL-CCNC: 23 U/L (ref 1–40)
BILIRUB SERPL-MCNC: 0.3 MG/DL (ref 0.2–1.2)
BUN BLD-MCNC: 10 MG/DL (ref 8–23)
BUN/CREAT SERPL: 12.7 (ref 7–25)
CALCIUM SPEC-SCNC: 9.4 MG/DL (ref 8.6–10.5)
CHLORIDE SERPL-SCNC: 103 MMOL/L (ref 98–107)
CO2 SERPL-SCNC: 24 MMOL/L (ref 22–29)
CREAT BLD-MCNC: 0.79 MG/DL (ref 0.76–1.27)
CREAT BLDA-MCNC: 0.7 MG/DL (ref 0.6–1.3)
CREATINE SERPL-MCNC: 0.7 MG/DL
ERYTHROCYTE [DISTWIDTH] IN BLOOD BY AUTOMATED COUNT: 19.4 % (ref 12.3–15.4)
GFR SERPL CREATININE-BSD FRML MDRD: 98 ML/MIN/1.73
GLOBULIN UR ELPH-MCNC: 3.2 GM/DL
GLUCOSE BLD-MCNC: 133 MG/DL (ref 65–99)
HCT VFR BLD AUTO: 41 % (ref 37.5–51)
HGB BLD-MCNC: 13.5 G/DL (ref 13–17.7)
LYMPHOCYTES # BLD AUTO: 0.7 10*3/MM3 (ref 0.7–3.1)
LYMPHOCYTES NFR BLD AUTO: 8.5 % (ref 19.6–45.3)
MCH RBC QN AUTO: 31.8 PG (ref 26.6–33)
MCHC RBC AUTO-ENTMCNC: 33 G/DL (ref 31.5–35.7)
MCV RBC AUTO: 96.4 FL (ref 79–97)
MONOCYTES # BLD AUTO: 0.4 10*3/MM3 (ref 0.1–0.9)
MONOCYTES NFR BLD AUTO: 5.6 % (ref 5–12)
NEUTROPHILS # BLD AUTO: 6.9 10*3/MM3 (ref 1.7–7)
NEUTROPHILS NFR BLD AUTO: 85.9 % (ref 42.7–76)
PLATELET # BLD AUTO: 242 10*3/MM3 (ref 140–450)
PMV BLD AUTO: 6.4 FL (ref 6–12)
POTASSIUM BLD-SCNC: 3.5 MMOL/L (ref 3.5–5.2)
PROT SERPL-MCNC: 7.5 G/DL (ref 6–8.5)
RBC # BLD AUTO: 4.25 10*6/MM3 (ref 4.14–5.8)
SODIUM BLD-SCNC: 140 MMOL/L (ref 136–145)
T4 FREE SERPL-MCNC: 1.26 NG/DL (ref 0.93–1.7)
TSH SERPL DL<=0.05 MIU/L-ACNC: 1.22 UIU/ML (ref 0.27–4.2)
WBC NRBC COR # BLD: 8 10*3/MM3 (ref 3.4–10.8)

## 2020-01-17 PROCEDURE — 25010000002 CARBOPLATIN PER 50 MG: Performed by: NURSE PRACTITIONER

## 2020-01-17 PROCEDURE — 25010000002 PEMBROLIZUMAB 100 MG/4ML SOLUTION 4 ML VIAL: Performed by: NURSE PRACTITIONER

## 2020-01-17 PROCEDURE — 84443 ASSAY THYROID STIM HORMONE: CPT | Performed by: NURSE PRACTITIONER

## 2020-01-17 PROCEDURE — 96411 CHEMO IV PUSH ADDL DRUG: CPT

## 2020-01-17 PROCEDURE — 96375 TX/PRO/DX INJ NEW DRUG ADDON: CPT

## 2020-01-17 PROCEDURE — 96366 THER/PROPH/DIAG IV INF ADDON: CPT

## 2020-01-17 PROCEDURE — 25010000002 FOSAPREPITANT PER 1 MG: Performed by: NURSE PRACTITIONER

## 2020-01-17 PROCEDURE — 25010000002 CYANOCOBALAMIN PER 1000 MCG: Performed by: NURSE PRACTITIONER

## 2020-01-17 PROCEDURE — 85025 COMPLETE CBC W/AUTO DIFF WBC: CPT | Performed by: NURSE PRACTITIONER

## 2020-01-17 PROCEDURE — 84439 ASSAY OF FREE THYROXINE: CPT | Performed by: NURSE PRACTITIONER

## 2020-01-17 PROCEDURE — 99214 OFFICE O/P EST MOD 30 MIN: CPT | Performed by: NURSE PRACTITIONER

## 2020-01-17 PROCEDURE — 80053 COMPREHEN METABOLIC PANEL: CPT | Performed by: NURSE PRACTITIONER

## 2020-01-17 PROCEDURE — 25010000002 PALONOSETRON 0.25 MG/5ML SOLUTION PREFILLED SYRINGE: Performed by: NURSE PRACTITIONER

## 2020-01-17 PROCEDURE — 96417 CHEMO IV INFUS EACH ADDL SEQ: CPT

## 2020-01-17 PROCEDURE — 82565 ASSAY OF CREATININE: CPT

## 2020-01-17 PROCEDURE — 96413 CHEMO IV INFUSION 1 HR: CPT

## 2020-01-17 PROCEDURE — 25010000002 PEMETREXED PER 10 MG: Performed by: NURSE PRACTITIONER

## 2020-01-17 PROCEDURE — 25010000002 PEMETREXED 100 MG RECONSTITUTED SOLUTION 100 MG VIAL: Performed by: NURSE PRACTITIONER

## 2020-01-17 PROCEDURE — 96367 TX/PROPH/DG ADDL SEQ IV INF: CPT

## 2020-01-17 PROCEDURE — 96372 THER/PROPH/DIAG INJ SC/IM: CPT

## 2020-01-17 RX ORDER — PALONOSETRON HYDROCHLORIDE 0.05 MG/ML
0.25 INJECTION, SOLUTION INTRAVENOUS ONCE
Status: COMPLETED | OUTPATIENT
Start: 2020-01-17 | End: 2020-01-17

## 2020-01-17 RX ORDER — DIPHENHYDRAMINE HYDROCHLORIDE 50 MG/ML
50 INJECTION INTRAMUSCULAR; INTRAVENOUS AS NEEDED
Status: CANCELLED | OUTPATIENT
Start: 2020-01-17

## 2020-01-17 RX ORDER — CYANOCOBALAMIN 1000 UG/ML
1000 INJECTION, SOLUTION INTRAMUSCULAR; SUBCUTANEOUS ONCE
Status: CANCELLED | OUTPATIENT
Start: 2020-01-17

## 2020-01-17 RX ORDER — CYANOCOBALAMIN 1000 UG/ML
1000 INJECTION, SOLUTION INTRAMUSCULAR; SUBCUTANEOUS ONCE
Status: COMPLETED | OUTPATIENT
Start: 2020-01-17 | End: 2020-01-17

## 2020-01-17 RX ORDER — PALONOSETRON 0.05 MG/ML
0.25 INJECTION, SOLUTION INTRAVENOUS ONCE
Status: CANCELLED | OUTPATIENT
Start: 2020-01-17

## 2020-01-17 RX ORDER — FAMOTIDINE 10 MG/ML
20 INJECTION, SOLUTION INTRAVENOUS AS NEEDED
Status: CANCELLED | OUTPATIENT
Start: 2020-01-17

## 2020-01-17 RX ORDER — SODIUM CHLORIDE 9 MG/ML
250 INJECTION, SOLUTION INTRAVENOUS ONCE
Status: CANCELLED | OUTPATIENT
Start: 2020-01-17

## 2020-01-17 RX ORDER — SODIUM CHLORIDE 9 MG/ML
250 INJECTION, SOLUTION INTRAVENOUS ONCE
Status: COMPLETED | OUTPATIENT
Start: 2020-01-17 | End: 2020-01-17

## 2020-01-17 RX ADMIN — SODIUM CHLORIDE 900 MG: 9 INJECTION, SOLUTION INTRAVENOUS at 12:58

## 2020-01-17 RX ADMIN — PALONOSETRON 0.25 MG: 0.25 INJECTION, SOLUTION INTRAVENOUS at 11:46

## 2020-01-17 RX ADMIN — SODIUM CHLORIDE 200 MG: 9 INJECTION, SOLUTION INTRAVENOUS at 11:58

## 2020-01-17 RX ADMIN — SODIUM CHLORIDE 250 ML: 9 INJECTION, SOLUTION INTRAVENOUS at 11:46

## 2020-01-17 RX ADMIN — CYANOCOBALAMIN 1000 MCG: 1000 INJECTION, SOLUTION INTRAMUSCULAR; SUBCUTANEOUS at 12:11

## 2020-01-17 RX ADMIN — CARBOPLATIN 730 MG: 10 INJECTION, SOLUTION INTRAVENOUS at 13:19

## 2020-01-17 RX ADMIN — SODIUM CHLORIDE 150 MG: 9 INJECTION, SOLUTION INTRAVENOUS at 12:37

## 2020-01-17 NOTE — PROGRESS NOTES
DATE OF VISIT: 1/17/2020    REASON FOR VISIT: Recurrent adenocarcinoma of the lung       HISTORY OF PRESENT ILLNESS: The patient is a very pleasant 64 y.o. male  with past medical history significant for lung cancer diagnosed 6/22/2016 . 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 PET scan done 6/22/2016 showed findings consistent with recurrent neoplasm in the left upper lobe, with metastatic adenopathy to the left hilum and AP window as well as a metastatic nodule in the left lower lobe. CT-guided biopsy of the left lower lobe nodule was performed that showed adenocarcinoma of the lung. The patient underwent CyberKnife therapy by Dr Pinzon completed 9/29/2016.  Repeat scan showed gradual increase in size of left upper lobe lung infiltrates.  Patient had whole-body PET scan that revealed increased metabolic activity in the left lung infiltrate as well as a new right supraclavicular lymph nodes.  Patient had bronchoscopy with biopsy done by Dr. You on November 6, 2019 that revealed adenocarcinoma and level 4 left mediastinal lymph node.  He received palliative course of radiation to the left upper lobe secondary to persistent hemoptysis followed by palliative treatment with carboplatin Alimta and Keytruda started December 4, 2019.  The patient is here today for scheduled follow up visit with treatment cycle #3.     SUBJECTIVE: The patient is here today with his wife. He has been doing fairly well. He has tolerated chemotherapy with minimal side effects. He has a cough at times with congestion in his throat, however this improved with use of Mucinex. He had some mild constipation after his last treatment, however this improved with use of probiotics. He denies hemoptysis. He has been eating well.     PAST MEDICAL HISTORY/SOCIAL HISTORY/FAMILY HISTORY: Unchanged from my prior documentation done on 08/02/2016.     Review of  Systems   Constitutional: Positive for fatigue. Negative for activity change, appetite change, chills, fever and unexpected weight change.   HENT: Negative for hearing loss, mouth sores, nosebleeds, sore throat and trouble swallowing.    Eyes: Negative for visual disturbance.   Respiratory: Positive for cough, shortness of breath and wheezing. Negative for chest tightness.         With activity   Cardiovascular: Negative for chest pain, palpitations and leg swelling.   Gastrointestinal: Positive for constipation. Negative for abdominal distention, abdominal pain, blood in stool, diarrhea, nausea, rectal pain and vomiting.   Endocrine: Negative for cold intolerance and heat intolerance.   Genitourinary: Negative for difficulty urinating, dysuria, frequency and urgency.   Musculoskeletal: Negative for arthralgias, back pain, gait problem, joint swelling and myalgias.   Skin: Negative for rash.   Neurological: Negative for dizziness, tremors, syncope, weakness, light-headedness, numbness and headaches.   Hematological: Negative for adenopathy. Does not bruise/bleed easily.   Psychiatric/Behavioral: Negative for confusion, sleep disturbance and suicidal ideas. The patient is not nervous/anxious.          Current Outpatient Medications:   •  albuterol (PROVENTIL HFA;VENTOLIN HFA) 108 (90 BASE) MCG/ACT inhaler, Inhale 2 puffs every 4 (four) hours as needed for wheezing or shortness of air., Disp: , Rfl:   •  dexamethasone (DECADRON) 4 MG tablet, Take 1 tablet twice daily starting the day before chemo, day of chemo, and day after chemo.  Take with food., Disp: 6 tablet, Rfl: 3  •  folic acid (FOLVITE) 1 MG tablet, Take 1 tablet by mouth Daily. Start at least 7 days prior to chemotherapy until at least 3 weeks after all chemotherapy., Disp: 30 tablet, Rfl: 3  •  guaiFENesin (MUCINEX) 600 MG 12 hr tablet, Take 1 tablet by mouth 2 (Two) Times a Day., Disp: 60 tablet, Rfl: 5  •  HYDROcodone-acetaminophen (NORCO) 7.5-325 MG per  tablet, Take 1 tablet by mouth every 6 (six) hours as needed for moderate pain (4-6)., Disp: , Rfl:   •  levocetirizine (XYZAL) 5 MG tablet, Take 5 mg by mouth Every Evening., Disp: , Rfl:   •  levothyroxine (SYNTHROID, LEVOTHROID) 75 MCG tablet, Take 75 mcg by mouth Daily., Disp: , Rfl:   •  omeprazole (priLOSEC) 40 MG capsule, Take 40 mg by mouth Daily., Disp: , Rfl:   •  ondansetron (ZOFRAN) 8 MG tablet, Take 1 tablet by mouth 3 (Three) Times a Day As Needed for Nausea or Vomiting., Disp: 30 tablet, Rfl: 3  •  SYMBICORT 160-4.5 MCG/ACT inhaler, INL 2 PUFFS PO BID, Disp: , Rfl: 3  •  tamsulosin (FLOMAX) 0.4 MG capsule 24 hr capsule, Take 1 capsule by mouth Daily., Disp: 30 capsule, Rfl: 6    PHYSICAL EXAMINATION:   There were no vitals taken for this visit.   ECOG Performance Status: 1 - Symptomatic but completely ambulatory  General Appearance:  alert, cooperative, no apparent distress and appears stated age   Neurologic/Psychiatric: A&O x 3, gait steady, appropriate affect, strength 5/5 in all muscle groups   HEENT:  Normocephalic, without obvious abnormality, mucous membranes moist   Neck: Supple, symmetrical, trachea midline, no adenopathy;  No thyromegaly, masses, or tenderness   Lungs:   Clear to auscultation bilaterally; respirations regular, even, and unlabored bilaterally   Heart:  Regular rate and rhythm, no murmurs appreciated   Abdomen:   Soft, non-tender, non-distended and no organomegaly   Lymph nodes: No cervical, supraclavicular, inguinal or axillary adenopathy noted   Extremities: Normal, atraumatic; no clubbing, cyanosis, or edema    Skin: No rashes, ulcers, or suspicious lesions noted     No visits with results within 2 Week(s) from this visit.   Latest known visit with results is:   Hospital Outpatient Visit on 12/27/2019   Component Date Value Ref Range Status   • Glucose 12/27/2019 146* 65 - 99 mg/dL Final   • BUN 12/27/2019 11  8 - 23 mg/dL Final   • Creatinine 12/27/2019 0.78  0.76 - 1.27  mg/dL Final   • Sodium 12/27/2019 141  136 - 145 mmol/L Final   • Potassium 12/27/2019 3.4* 3.5 - 5.2 mmol/L Final   • Chloride 12/27/2019 103  98 - 107 mmol/L Final   • CO2 12/27/2019 25.0  22.0 - 29.0 mmol/L Final   • Calcium 12/27/2019 9.3  8.6 - 10.5 mg/dL Final   • Total Protein 12/27/2019 7.5  6.0 - 8.5 g/dL Final   • Albumin 12/27/2019 4.20  3.50 - 5.20 g/dL Final   • ALT (SGPT) 12/27/2019 26  1 - 41 U/L Final   • AST (SGOT) 12/27/2019 27  1 - 40 U/L Final   • Alkaline Phosphatase 12/27/2019 106  39 - 117 U/L Final   • Total Bilirubin 12/27/2019 0.2  0.2 - 1.2 mg/dL Final   • eGFR Non African Amer 12/27/2019 100  >60 mL/min/1.73 Final   • Globulin 12/27/2019 3.3  gm/dL Final   • A/G Ratio 12/27/2019 1.3  g/dL Final   • BUN/Creatinine Ratio 12/27/2019 14.1  7.0 - 25.0 Final   • Anion Gap 12/27/2019 13.0  5.0 - 15.0 mmol/L Final   • WBC 12/27/2019 7.50  3.40 - 10.80 10*3/mm3 Final   • RBC 12/27/2019 4.32  4.14 - 5.80 10*6/mm3 Final   • Hemoglobin 12/27/2019 13.4  13.0 - 17.7 g/dL Final   • Hematocrit 12/27/2019 40.4  37.5 - 51.0 % Final   • RDW 12/27/2019 16.1* 12.3 - 15.4 % Final   • MCV 12/27/2019 93.6  79.0 - 97.0 fL Final   • MCH 12/27/2019 31.1  26.6 - 33.0 pg Final   • MCHC 12/27/2019 33.2  31.5 - 35.7 g/dL Final   • MPV 12/27/2019 6.4  6.0 - 12.0 fL Final   • Platelets 12/27/2019 305  140 - 450 10*3/mm3 Final   • Neutrophil % 12/27/2019 87.2* 42.7 - 76.0 % Final   • Lymphocyte % 12/27/2019 6.9* 19.6 - 45.3 % Final   • Monocyte % 12/27/2019 5.9  5.0 - 12.0 % Final   • Neutrophils, Absolute 12/27/2019 6.50  1.70 - 7.00 10*3/mm3 Final   • Lymphocytes, Absolute 12/27/2019 0.50* 0.70 - 3.10 10*3/mm3 Final   • Monocytes, Absolute 12/27/2019 0.40  0.10 - 0.90 10*3/mm3 Final   • Creatinine 12/27/2019 0.8  mg/dL Final   • Creatinine 12/27/2019 0.80  0.60 - 1.30 mg/dL Final    Serial Number: [id number]:  [id number]        No results found.(  ASSESSMENT: The patient is a very pleasant 61 y.o. male with metastatic  adenocarcinoma of the lung    PROBLEM LIST:  1. Non-small cell lung cancer originally diagnosed in 2009 status post right upper lobectomy done by Dr. Real in 2009.  2. Left upper lobe lung nodules found on surveillance CT in 2012, status post left upper lobe wedge resection with benign pathology.  3.  Left lung cancer, adenocarcinoma:  A.  Presented as a new left lower lobe lung nodule found on chest x-ray done 6/2016.   B. CT scan of the chest done 6/16/2016 showing interval enlargement of left lung nodules. PET CT showing hypermetabolic activity in the left lower lobe nodule.   C. CT guided biopsy of left lower lobe nodule positive for adenocarcinoma of the lung. [id number], stage Ia.  C. Status post CyberKnife therapy completed 9/29/2016 per Dr. Lesia CHÁVEZ.  Repeated PET scan done on October 2019 revealed increase in size as well as hypermetabolic activity left upper lobe lung nodule with a new hypermetabolic active right supraclavicular lymph node and essentially other stable findings.  E.  Status post bronchoscopy with biopsy done by Dr. You November 6, 2019 revealed adenocarcinoma from level 4 left and suspicious cytology from left upper lobe.  F.  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  No enough tissue to do PDL 1 testing.  G.  Started carbo Alimta and Keytruda December 04, 2019, status post 2 cycle  4.  Hemoptysis:  A.  Status post venous embolization done by Dr. Camp November 7, 2019  B.  Completed by radiation to left upper lobe mass November 26, 2019  4.  Black lung  5. Sarcoidosis  6. Hypothyroidism  7.  Upper airway infection  8.  Treatment induced nausea    PLAN:  1.  I we will proceed with treatment using carbo/Alimta and Keytruda once every 3 weeks, cycle #3.  This is in compliance with NCCN guidelines.  2.  The patient can continue Mucinex 600 mg take by mouth twice per day as needed for congestion and cough.   3.  The patient will follow-up with us  in 3 weeks with cycle #4.  4. He will continue levothyroxine for hypothyroidism.   5.  We discussed potential side effects of immunotherapy including but not limited to immune mediated reactions with thyroiditis, pneumonitis, hepatitis, colitis, rash, and electrolytes abnormalities, fatigue, multiorgan failure, and possibly death.  6. We reviewed the potential side effects of this regimen including fatigue, vomiting and nausea, hair loss, nephropathy, neuropathy, hearing loss, myelosuppression, and risk of infusion reaction.  7.  I will monitor the patient blood work including blood counts kidney function liver function and electrolytes.  8.  I will repeat patient scans prior to cycle #4. We will order PET scan for prior to return. PET scan has correlated his disease better than CT's in the past. If he has good response to treatment, we will consider changing to maintenance treatment after cycle #4 of chemotherapy.   9.  We will continue Zofran as needed for chemotherapy-induced nausea.  10.  We will continue folic acid, vitamin B12, and Decadron per Alimta protocol.  11. The patient will continue probiotic daily for constipation and bloating.     Snehal Stallworth, APRN  1/17/2020

## 2020-02-07 ENCOUNTER — HOSPITAL ENCOUNTER (OUTPATIENT)
Dept: PET IMAGING | Facility: HOSPITAL | Age: 65
Discharge: HOME | End: 2020-02-07

## 2020-02-07 ENCOUNTER — HOSPITAL ENCOUNTER (OUTPATIENT)
Dept: ONCOLOGY | Facility: HOSPITAL | Age: 65
Setting detail: INFUSION SERIES
Discharge: HOME | End: 2020-02-07

## 2020-02-07 ENCOUNTER — OFFICE VISIT (OUTPATIENT)
Dept: ONCOLOGY | Facility: CLINIC | Age: 65
End: 2020-02-07

## 2020-02-07 ENCOUNTER — HOSPITAL ENCOUNTER (OUTPATIENT)
Dept: PET IMAGING | Facility: HOSPITAL | Age: 65
Discharge: HOME | End: 2020-02-07
Admitting: NURSE PRACTITIONER

## 2020-02-07 VITALS
SYSTOLIC BLOOD PRESSURE: 152 MMHG | HEART RATE: 101 BPM | RESPIRATION RATE: 18 BRPM | TEMPERATURE: 98.2 F | BODY MASS INDEX: 28.93 KG/M2 | DIASTOLIC BLOOD PRESSURE: 78 MMHG | HEIGHT: 66 IN | OXYGEN SATURATION: 94 % | WEIGHT: 180 LBS

## 2020-02-07 DIAGNOSIS — C34.32 MALIGNANT NEOPLASM OF LOWER LOBE OF LEFT LUNG (HCC): Primary | ICD-10-CM

## 2020-02-07 DIAGNOSIS — C34.32 MALIGNANT NEOPLASM OF LOWER LOBE OF LEFT LUNG (HCC): ICD-10-CM

## 2020-02-07 DIAGNOSIS — C34.12 MALIGNANT NEOPLASM OF UPPER LOBE OF LEFT LUNG (HCC): Primary | ICD-10-CM

## 2020-02-07 DIAGNOSIS — C34.12 MALIGNANT NEOPLASM OF UPPER LOBE OF LEFT LUNG (HCC): ICD-10-CM

## 2020-02-07 LAB
ALBUMIN SERPL-MCNC: 4.5 G/DL (ref 3.5–5.2)
ALBUMIN/GLOB SERPL: 1.4 G/DL
ALP SERPL-CCNC: 103 U/L (ref 39–117)
ALT SERPL W P-5'-P-CCNC: 26 U/L (ref 1–41)
ANION GAP SERPL CALCULATED.3IONS-SCNC: 15 MMOL/L (ref 5–15)
AST SERPL-CCNC: 38 U/L (ref 1–40)
BILIRUB SERPL-MCNC: 0.3 MG/DL (ref 0.2–1.2)
BUN BLD-MCNC: 10 MG/DL (ref 8–23)
BUN/CREAT SERPL: 11.9 (ref 7–25)
CALCIUM SPEC-SCNC: 9.5 MG/DL (ref 8.6–10.5)
CHLORIDE SERPL-SCNC: 104 MMOL/L (ref 98–107)
CO2 SERPL-SCNC: 24 MMOL/L (ref 22–29)
CREAT BLD-MCNC: 0.84 MG/DL (ref 0.76–1.27)
CREAT BLDA-MCNC: 0.8 MG/DL
ERYTHROCYTE [DISTWIDTH] IN BLOOD BY AUTOMATED COUNT: 22 % (ref 12.3–15.4)
GFR SERPL CREATININE-BSD FRML MDRD: 92 ML/MIN/1.73
GLOBULIN UR ELPH-MCNC: 3.3 GM/DL
GLUCOSE BLD-MCNC: 184 MG/DL (ref 65–99)
GLUCOSE BLDC GLUCOMTR-MCNC: 177 MG/DL (ref 70–130)
HCT VFR BLD AUTO: 36.4 % (ref 37.5–51)
HGB BLD-MCNC: 12.2 G/DL (ref 13–17.7)
LYMPHOCYTES # BLD AUTO: 0.6 10*3/MM3 (ref 0.7–3.1)
LYMPHOCYTES NFR BLD AUTO: 10.5 % (ref 19.6–45.3)
MCH RBC QN AUTO: 31.7 PG (ref 26.6–33)
MCHC RBC AUTO-ENTMCNC: 33.5 G/DL (ref 31.5–35.7)
MCV RBC AUTO: 94.6 FL (ref 79–97)
MONOCYTES # BLD AUTO: 0.2 10*3/MM3 (ref 0.1–0.9)
MONOCYTES NFR BLD AUTO: 3.1 % (ref 5–12)
NEUTROPHILS # BLD AUTO: 4.9 10*3/MM3 (ref 1.7–7)
NEUTROPHILS NFR BLD AUTO: 86.4 % (ref 42.7–76)
PLATELET # BLD AUTO: 174 10*3/MM3 (ref 140–450)
PMV BLD AUTO: 6.7 FL (ref 6–12)
POTASSIUM BLD-SCNC: 3.5 MMOL/L (ref 3.5–5.2)
PROT SERPL-MCNC: 7.8 G/DL (ref 6–8.5)
RBC # BLD AUTO: 3.84 10*6/MM3 (ref 4.14–5.8)
SODIUM BLD-SCNC: 143 MMOL/L (ref 136–145)
WBC NRBC COR # BLD: 5.7 10*3/MM3 (ref 3.4–10.8)

## 2020-02-07 PROCEDURE — 25010000002 PEMETREXED 100 MG RECONSTITUTED SOLUTION 100 MG VIAL: Performed by: NURSE PRACTITIONER

## 2020-02-07 PROCEDURE — A9552 F18 FDG: HCPCS | Performed by: NURSE PRACTITIONER

## 2020-02-07 PROCEDURE — 25010000002 CARBOPLATIN PER 50 MG: Performed by: NURSE PRACTITIONER

## 2020-02-07 PROCEDURE — 85025 COMPLETE CBC W/AUTO DIFF WBC: CPT | Performed by: NURSE PRACTITIONER

## 2020-02-07 PROCEDURE — 82565 ASSAY OF CREATININE: CPT

## 2020-02-07 PROCEDURE — 96411 CHEMO IV PUSH ADDL DRUG: CPT

## 2020-02-07 PROCEDURE — 96375 TX/PRO/DX INJ NEW DRUG ADDON: CPT

## 2020-02-07 PROCEDURE — 96417 CHEMO IV INFUS EACH ADDL SEQ: CPT

## 2020-02-07 PROCEDURE — 25010000002 PEMETREXED PER 10 MG: Performed by: NURSE PRACTITIONER

## 2020-02-07 PROCEDURE — 82962 GLUCOSE BLOOD TEST: CPT

## 2020-02-07 PROCEDURE — 96374 THER/PROPH/DIAG INJ IV PUSH: CPT

## 2020-02-07 PROCEDURE — 25010000002 PALONOSETRON 0.25 MG/5ML SOLUTION PREFILLED SYRINGE: Performed by: NURSE PRACTITIONER

## 2020-02-07 PROCEDURE — 80053 COMPREHEN METABOLIC PANEL: CPT | Performed by: NURSE PRACTITIONER

## 2020-02-07 PROCEDURE — 25010000002 FOSAPREPITANT PER 1 MG: Performed by: NURSE PRACTITIONER

## 2020-02-07 PROCEDURE — 25010000002 PEMBROLIZUMAB 100 MG/4ML SOLUTION 4 ML VIAL: Performed by: NURSE PRACTITIONER

## 2020-02-07 PROCEDURE — 96413 CHEMO IV INFUSION 1 HR: CPT

## 2020-02-07 PROCEDURE — 99214 OFFICE O/P EST MOD 30 MIN: CPT | Performed by: NURSE PRACTITIONER

## 2020-02-07 PROCEDURE — 96367 TX/PROPH/DG ADDL SEQ IV INF: CPT

## 2020-02-07 PROCEDURE — 78815 PET IMAGE W/CT SKULL-THIGH: CPT

## 2020-02-07 PROCEDURE — 0 FLUDEOXYGLUCOSE F18 SOLUTION: Performed by: NURSE PRACTITIONER

## 2020-02-07 RX ORDER — DIPHENHYDRAMINE HYDROCHLORIDE 50 MG/ML
50 INJECTION INTRAMUSCULAR; INTRAVENOUS AS NEEDED
Status: CANCELLED | OUTPATIENT
Start: 2020-02-07

## 2020-02-07 RX ORDER — FAMOTIDINE 10 MG/ML
20 INJECTION, SOLUTION INTRAVENOUS AS NEEDED
Status: CANCELLED | OUTPATIENT
Start: 2020-02-07

## 2020-02-07 RX ORDER — DEXAMETHASONE 4 MG/1
TABLET ORAL
Qty: 30 TABLET | Refills: 3 | Status: SHIPPED | OUTPATIENT
Start: 2020-02-07 | End: 2021-03-16 | Stop reason: DRUGHIGH

## 2020-02-07 RX ORDER — SODIUM CHLORIDE 9 MG/ML
250 INJECTION, SOLUTION INTRAVENOUS ONCE
Status: CANCELLED | OUTPATIENT
Start: 2020-02-07

## 2020-02-07 RX ORDER — FOLIC ACID 1 MG/1
1 TABLET ORAL DAILY
Qty: 30 TABLET | Refills: 3 | Status: SHIPPED | OUTPATIENT
Start: 2020-02-07 | End: 2020-02-27 | Stop reason: SDUPTHER

## 2020-02-07 RX ORDER — PALONOSETRON HYDROCHLORIDE 0.05 MG/ML
0.25 INJECTION, SOLUTION INTRAVENOUS ONCE
Status: COMPLETED | OUTPATIENT
Start: 2020-02-07 | End: 2020-02-07

## 2020-02-07 RX ORDER — PALONOSETRON 0.05 MG/ML
0.25 INJECTION, SOLUTION INTRAVENOUS ONCE
Status: CANCELLED | OUTPATIENT
Start: 2020-02-07

## 2020-02-07 RX ORDER — LACTULOSE 10 G/15ML
20 SOLUTION ORAL 3 TIMES DAILY
Qty: 240 ML | Refills: 2 | Status: SHIPPED | OUTPATIENT
Start: 2020-02-07

## 2020-02-07 RX ADMIN — SODIUM CHLORIDE 150 MG: 9 INJECTION, SOLUTION INTRAVENOUS at 12:05

## 2020-02-07 RX ADMIN — SODIUM CHLORIDE 900 MG: 9 INJECTION, SOLUTION INTRAVENOUS at 12:40

## 2020-02-07 RX ADMIN — SODIUM CHLORIDE 200 MG: 9 INJECTION, SOLUTION INTRAVENOUS at 11:26

## 2020-02-07 RX ADMIN — PALONOSETRON 0.25 MG: 0.25 INJECTION, SOLUTION INTRAVENOUS at 12:06

## 2020-02-07 RX ADMIN — CARBOPLATIN 660 MG: 10 INJECTION, SOLUTION INTRAVENOUS at 12:54

## 2020-02-07 RX ADMIN — FLUDEOXYGLUCOSE F18 1 DOSE: 300 INJECTION INTRAVENOUS at 08:28

## 2020-02-07 NOTE — PROGRESS NOTES
DATE OF VISIT: 2/7/2020    REASON FOR VISIT: Recurrent adenocarcinoma of the lung       HISTORY OF PRESENT ILLNESS: The patient is a very pleasant 64 y.o. male  with past medical history significant for lung cancer diagnosed 6/22/2016 . 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 PET scan done 6/22/2016 showed findings consistent with recurrent neoplasm in the left upper lobe, with metastatic adenopathy to the left hilum and AP window as well as a metastatic nodule in the left lower lobe. CT-guided biopsy of the left lower lobe nodule was performed that showed adenocarcinoma of the lung. The patient underwent CyberKnife therapy by Dr Pinzon completed 9/29/2016.  Repeat scan showed gradual increase in size of left upper lobe lung infiltrates.  Patient had whole-body PET scan that revealed increased metabolic activity in the left lung infiltrate as well as a new right supraclavicular lymph nodes.  Patient had bronchoscopy with biopsy done by Dr. You on November 6, 2019 that revealed adenocarcinoma and level 4 left mediastinal lymph node.  He received palliative course of radiation to the left upper lobe secondary to persistent hemoptysis followed by palliative treatment with carboplatin Alimta and Keytruda started December 4, 2019.  The patient is here today for scheduled follow up visit with treatment cycle #4.     SUBJECTIVE: The patient is here today with his wife. He has been doing fairly well. He tolerated his last cycle well except for worsening issues with constipation. He has increased his fiber in his diet and added Metamucil, however this did not improve. He also waited to start a bowel regimen until after several days of constipation. His bowels are working better now. He is eating and drinking well. He denies fever or chills. His breathing is stable. He is anxious today regarding the results of his PET scan.     PAST  MEDICAL HISTORY/SOCIAL HISTORY/FAMILY HISTORY: Unchanged from my prior documentation done on 08/02/2016.     Review of Systems   Constitutional: Positive for fatigue. Negative for activity change, appetite change, chills, fever and unexpected weight change.   HENT: Negative for hearing loss, mouth sores, nosebleeds, sore throat and trouble swallowing.    Eyes: Negative for visual disturbance.   Respiratory: Positive for cough, shortness of breath and wheezing. Negative for chest tightness.         With activity   Cardiovascular: Negative for chest pain, palpitations and leg swelling.   Gastrointestinal: Positive for constipation. Negative for abdominal distention, abdominal pain, blood in stool, diarrhea, nausea, rectal pain and vomiting.   Endocrine: Negative for cold intolerance and heat intolerance.   Genitourinary: Negative for difficulty urinating, dysuria, frequency and urgency.   Musculoskeletal: Negative for arthralgias, back pain, gait problem, joint swelling and myalgias.   Skin: Negative for rash.   Neurological: Negative for dizziness, tremors, syncope, weakness, light-headedness, numbness and headaches.   Hematological: Negative for adenopathy. Does not bruise/bleed easily.   Psychiatric/Behavioral: Negative for confusion, sleep disturbance and suicidal ideas. The patient is not nervous/anxious.          Current Outpatient Medications:   •  albuterol (PROVENTIL HFA;VENTOLIN HFA) 108 (90 BASE) MCG/ACT inhaler, Inhale 2 puffs every 4 (four) hours as needed for wheezing or shortness of air., Disp: , Rfl:   •  dexamethasone (DECADRON) 4 MG tablet, Take 1 tablet twice daily starting the day before chemo, day of chemo, and day after chemo.  Take with food., Disp: 6 tablet, Rfl: 3  •  folic acid (FOLVITE) 1 MG tablet, Take 1 tablet by mouth Daily. Start at least 7 days prior to chemotherapy until at least 3 weeks after all chemotherapy., Disp: 30 tablet, Rfl: 3  •  guaiFENesin (MUCINEX) 600 MG 12 hr tablet,  "Take 1 tablet by mouth 2 (Two) Times a Day., Disp: 60 tablet, Rfl: 5  •  HYDROcodone-acetaminophen (NORCO) 7.5-325 MG per tablet, Take 1 tablet by mouth every 6 (six) hours as needed for moderate pain (4-6)., Disp: , Rfl:   •  levocetirizine (XYZAL) 5 MG tablet, Take 5 mg by mouth Every Evening., Disp: , Rfl:   •  levothyroxine (SYNTHROID, LEVOTHROID) 75 MCG tablet, Take 75 mcg by mouth Daily., Disp: , Rfl:   •  omeprazole (priLOSEC) 40 MG capsule, Take 40 mg by mouth Daily., Disp: , Rfl:   •  ondansetron (ZOFRAN) 8 MG tablet, Take 1 tablet by mouth 3 (Three) Times a Day As Needed for Nausea or Vomiting., Disp: 30 tablet, Rfl: 3  •  SYMBICORT 160-4.5 MCG/ACT inhaler, INL 2 PUFFS PO BID, Disp: , Rfl: 3  •  tamsulosin (FLOMAX) 0.4 MG capsule 24 hr capsule, Take 1 capsule by mouth Daily., Disp: 30 capsule, Rfl: 6  No current facility-administered medications for this visit.     PHYSICAL EXAMINATION:   /78   Pulse 101   Temp 98.2 °F (36.8 °C) (Temporal)   Resp 18   Ht 167.6 cm (65.98\")   Wt 81.6 kg (180 lb)   SpO2 94%   BMI 29.07 kg/m²    ECOG Performance Status: 1 - Symptomatic but completely ambulatory  General Appearance:  alert, cooperative, no apparent distress and appears stated age   Neurologic/Psychiatric: A&O x 3, gait steady, appropriate affect, strength 5/5 in all muscle groups   HEENT:  Normocephalic, without obvious abnormality, mucous membranes moist   Neck: Supple, symmetrical, trachea midline, no adenopathy;  No thyromegaly, masses, or tenderness   Lungs:   Clear to auscultation bilaterally; respirations regular, even, and unlabored bilaterally   Heart:  Regular rate and rhythm, no murmurs appreciated   Abdomen:   Soft, non-tender, non-distended and no organomegaly   Lymph nodes: No cervical, supraclavicular, inguinal or axillary adenopathy noted   Extremities: Normal, atraumatic; no clubbing, cyanosis, or edema    Skin: No rashes, ulcers, or suspicious lesions noted     Hospital Outpatient " Visit on 02/07/2020   Component Date Value Ref Range Status   • Glucose 02/07/2020 177* 70 - 130 mg/dL Final        No results found.(  ASSESSMENT: The patient is a very pleasant 61 y.o. male with metastatic adenocarcinoma of the lung    PROBLEM LIST:  1. Non-small cell lung cancer originally diagnosed in 2009 status post right upper lobectomy done by Dr. Real in 2009.  2. Left upper lobe lung nodules found on surveillance CT in 2012, status post left upper lobe wedge resection with benign pathology.  3.  Left lung cancer, adenocarcinoma:  A.  Presented as a new left lower lobe lung nodule found on chest x-ray done 6/2016.   B. CT scan of the chest done 6/16/2016 showing interval enlargement of left lung nodules. PET CT showing hypermetabolic activity in the left lower lobe nodule.   C. CT guided biopsy of left lower lobe nodule positive for adenocarcinoma of the lung. [id number], stage Ia.  C. Status post CyberKnife therapy completed 9/29/2016 per Dr. Lesia CHÁVEZ.  Repeated PET scan done on October 2019 revealed increase in size as well as hypermetabolic activity left upper lobe lung nodule with a new hypermetabolic active right supraclavicular lymph node and essentially other stable findings.  E.  Status post bronchoscopy with biopsy done by Dr. You November 6, 2019 revealed adenocarcinoma from level 4 left and suspicious cytology from left upper lobe.  F.  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  No enough tissue to do PDL 1 testing.  G.  Started carbo Alimta and Keytruda December 04, 2019, status post 3 cycle  4.  Hemoptysis:  A.  Status post venous embolization done by Dr. Camp November 7, 2019  B.  Completed by radiation to left upper lobe mass November 26, 2019  4.  Black lung  5. Sarcoidosis  6. Hypothyroidism  7.  Upper airway infection  8.  Treatment induced nausea    PLAN:  1.  I reviewed the PET scan results with the patient and his wife. I reviewed the image  myself as well as with him. I told the patient he has had good response to treatment with decreased activity in both the left upper lobe as well as the right supraclavicular lymph node with no new areas of disease.   2. We will proceed today using cycle #4 of  carbo/Alimta/Keytruda as planned.   3. We will see the patient back in 3 weeks to begin maintenance regimen using Alimta/Keytruda alone.    4. We will monitor the patient's labs throughout treatment including blood counts, kidney function, thyroid function, and liver functions.   5.  We reviewed again the potential side effects of immunotherapy including but not limited to immune mediated reactions with thyroiditis, pneumonitis, hepatitis, colitis, rash, and electrolytes abnormalities, fatigue, multiorgan failure, and possibly death.  6. We reviewed again the potential side effects of this regimen including fatigue, vomiting and nausea, hair loss, nephropathy, neuropathy, hearing loss, myelosuppression, and risk of infusion reaction.  7.  The patient can continue Mucinex 600 mg take by mouth twice per day as needed for congestion and cough.   8. He will continue levothyroxine 75 mcg for hypothyroidism. We will adjust his dose if needed for fluctuations in TSH.   9.  We will continue Zofran as needed for chemotherapy-induced nausea.  10.  We will continue folic acid, vitamin B12, and Decadron per Alimta protocol.  11. The patient will continue Miralax and Senakot for constipation. We will start him on Lactulose to be used as needed three times per day for worsening constipation.     Snehal Stallworth, APRN  2/7/2020

## 2020-02-12 LAB — CREAT BLDA-MCNC: 0.8 MG/DL (ref 0.6–1.3)

## 2020-02-27 DIAGNOSIS — C34.12 MALIGNANT NEOPLASM OF UPPER LOBE OF LEFT LUNG (HCC): Primary | ICD-10-CM

## 2020-02-27 RX ORDER — FOLIC ACID 1 MG/1
1 TABLET ORAL DAILY
Qty: 30 TABLET | Refills: 5 | Status: SHIPPED | OUTPATIENT
Start: 2020-02-27 | End: 2021-02-01

## 2020-02-27 RX ORDER — DEXAMETHASONE 4 MG/1
TABLET ORAL
Qty: 30 TABLET | Refills: 5 | Status: SHIPPED | OUTPATIENT
Start: 2020-02-27 | End: 2021-03-16

## 2020-02-27 RX ORDER — ONDANSETRON HYDROCHLORIDE 8 MG/1
8 TABLET, FILM COATED ORAL 3 TIMES DAILY PRN
Qty: 30 TABLET | Refills: 5 | Status: SHIPPED | OUTPATIENT
Start: 2020-02-27 | End: 2021-03-16

## 2020-02-28 ENCOUNTER — HOSPITAL ENCOUNTER (OUTPATIENT)
Dept: ONCOLOGY | Facility: HOSPITAL | Age: 65
Setting detail: INFUSION SERIES
Discharge: HOME | End: 2020-02-28

## 2020-02-28 ENCOUNTER — OFFICE VISIT (OUTPATIENT)
Dept: ONCOLOGY | Facility: CLINIC | Age: 65
End: 2020-02-28

## 2020-02-28 VITALS
HEIGHT: 66 IN | WEIGHT: 183 LBS | BODY MASS INDEX: 29.41 KG/M2 | HEART RATE: 104 BPM | SYSTOLIC BLOOD PRESSURE: 150 MMHG | TEMPERATURE: 97.9 F | DIASTOLIC BLOOD PRESSURE: 76 MMHG | RESPIRATION RATE: 18 BRPM | OXYGEN SATURATION: 97 %

## 2020-02-28 DIAGNOSIS — C34.12 MALIGNANT NEOPLASM OF UPPER LOBE OF LEFT LUNG (HCC): Primary | ICD-10-CM

## 2020-02-28 DIAGNOSIS — C34.32 MALIGNANT NEOPLASM OF LOWER LOBE OF LEFT LUNG (HCC): Primary | ICD-10-CM

## 2020-02-28 LAB
ALBUMIN SERPL-MCNC: 4.3 G/DL (ref 3.5–5.2)
ALBUMIN/GLOB SERPL: 1.4 G/DL
ALP SERPL-CCNC: 99 U/L (ref 39–117)
ALT SERPL W P-5'-P-CCNC: 24 U/L (ref 1–41)
ANION GAP SERPL CALCULATED.3IONS-SCNC: 15 MMOL/L (ref 5–15)
AST SERPL-CCNC: 34 U/L (ref 1–40)
BILIRUB SERPL-MCNC: 0.3 MG/DL (ref 0.2–1.2)
BUN BLD-MCNC: 10 MG/DL (ref 8–23)
BUN/CREAT SERPL: 10.5 (ref 7–25)
CALCIUM SPEC-SCNC: 8.8 MG/DL (ref 8.6–10.5)
CHLORIDE SERPL-SCNC: 100 MMOL/L (ref 98–107)
CO2 SERPL-SCNC: 22 MMOL/L (ref 22–29)
CREAT BLD-MCNC: 0.95 MG/DL (ref 0.76–1.27)
CREAT BLDA-MCNC: 0.9 MG/DL (ref 0.6–1.3)
ERYTHROCYTE [DISTWIDTH] IN BLOOD BY AUTOMATED COUNT: 23.3 % (ref 12.3–15.4)
GFR SERPL CREATININE-BSD FRML MDRD: 80 ML/MIN/1.73
GLOBULIN UR ELPH-MCNC: 3 GM/DL
GLUCOSE BLD-MCNC: 227 MG/DL (ref 65–99)
HCT VFR BLD AUTO: 34.1 % (ref 37.5–51)
HGB BLD-MCNC: 11.5 G/DL (ref 13–17.7)
LYMPHOCYTES # BLD AUTO: 0.3 10*3/MM3 (ref 0.7–3.1)
LYMPHOCYTES NFR BLD AUTO: 7.6 % (ref 19.6–45.3)
MCH RBC QN AUTO: 32.8 PG (ref 26.6–33)
MCHC RBC AUTO-ENTMCNC: 33.9 G/DL (ref 31.5–35.7)
MCV RBC AUTO: 96.8 FL (ref 79–97)
MONOCYTES # BLD AUTO: 0.2 10*3/MM3 (ref 0.1–0.9)
MONOCYTES NFR BLD AUTO: 4.6 % (ref 5–12)
NEUTROPHILS # BLD AUTO: 3.8 10*3/MM3 (ref 1.7–7)
NEUTROPHILS NFR BLD AUTO: 87.8 % (ref 42.7–76)
PLATELET # BLD AUTO: 192 10*3/MM3 (ref 140–450)
PMV BLD AUTO: 6.5 FL (ref 6–12)
POTASSIUM BLD-SCNC: 3.5 MMOL/L (ref 3.5–5.2)
PROT SERPL-MCNC: 7.3 G/DL (ref 6–8.5)
RBC # BLD AUTO: 3.53 10*6/MM3 (ref 4.14–5.8)
SODIUM BLD-SCNC: 137 MMOL/L (ref 136–145)
T4 FREE SERPL-MCNC: 0.98 NG/DL (ref 0.93–1.7)
TSH SERPL DL<=0.05 MIU/L-ACNC: 3.37 UIU/ML (ref 0.27–4.2)
WBC NRBC COR # BLD: 4.3 10*3/MM3 (ref 3.4–10.8)

## 2020-02-28 PROCEDURE — 96413 CHEMO IV INFUSION 1 HR: CPT

## 2020-02-28 PROCEDURE — 80053 COMPREHEN METABOLIC PANEL: CPT | Performed by: INTERNAL MEDICINE

## 2020-02-28 PROCEDURE — 96411 CHEMO IV PUSH ADDL DRUG: CPT

## 2020-02-28 PROCEDURE — 84443 ASSAY THYROID STIM HORMONE: CPT | Performed by: INTERNAL MEDICINE

## 2020-02-28 PROCEDURE — 85025 COMPLETE CBC W/AUTO DIFF WBC: CPT | Performed by: INTERNAL MEDICINE

## 2020-02-28 PROCEDURE — 84439 ASSAY OF FREE THYROXINE: CPT | Performed by: INTERNAL MEDICINE

## 2020-02-28 PROCEDURE — 99215 OFFICE O/P EST HI 40 MIN: CPT | Performed by: INTERNAL MEDICINE

## 2020-02-28 PROCEDURE — 25010000002 PEMETREXED 100 MG RECONSTITUTED SOLUTION 100 MG VIAL: Performed by: INTERNAL MEDICINE

## 2020-02-28 PROCEDURE — 82565 ASSAY OF CREATININE: CPT

## 2020-02-28 PROCEDURE — 25010000002 PEMETREXED PER 10 MG: Performed by: INTERNAL MEDICINE

## 2020-02-28 PROCEDURE — 25010000002 PEMBROLIZUMAB 100 MG/4ML SOLUTION 4 ML VIAL: Performed by: INTERNAL MEDICINE

## 2020-02-28 RX ORDER — SODIUM CHLORIDE 9 MG/ML
250 INJECTION, SOLUTION INTRAVENOUS ONCE
Status: DISCONTINUED | OUTPATIENT
Start: 2020-02-28 | End: 2020-02-29 | Stop reason: HOSPADM

## 2020-02-28 RX ORDER — SODIUM CHLORIDE 9 MG/ML
250 INJECTION, SOLUTION INTRAVENOUS ONCE
Status: CANCELLED | OUTPATIENT
Start: 2020-02-28

## 2020-02-28 RX ADMIN — SODIUM CHLORIDE 200 MG: 9 INJECTION, SOLUTION INTRAVENOUS at 09:50

## 2020-02-28 RX ADMIN — SODIUM CHLORIDE 900 MG: 9 INJECTION, SOLUTION INTRAVENOUS at 10:28

## 2020-02-28 NOTE — PROGRESS NOTES
DATE OF VISIT: 2/28/2020    REASON FOR VISIT: Recurrent adenocarcinoma of the lung       HISTORY OF PRESENT ILLNESS: The patient is a very pleasant 64 y.o. male  with past medical history significant for lung cancer diagnosed 6/22/2016 . 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 PET scan done 6/22/2016 showed findings consistent with recurrent neoplasm in the left upper lobe, with metastatic adenopathy to the left hilum and AP window as well as a metastatic nodule in the left lower lobe. CT-guided biopsy of the left lower lobe nodule was performed that showed adenocarcinoma of the lung. The patient underwent CyberKnife therapy by Dr Pinzon completed 9/29/2016.  Repeat scan showed gradual increase in size of left upper lobe lung infiltrates.  Patient had whole-body PET scan that revealed increased metabolic activity in the left lung infiltrate as well as a new right supraclavicular lymph nodes.  Patient had bronchoscopy with biopsy done by Dr. You on November 6, 2019 that revealed adenocarcinoma and level 4 left mediastinal lymph node.  He received palliative course of radiation to the left upper lobe secondary to persistent hemoptysis followed by palliative treatment with carboplatin Alimta and Keytruda started December 4, 2019.  Completed 4 cycles on February 7, 2020.  The patient was started on Alimta with Keytruda on February 28, 2020.  The patient is here today for scheduled follow up visit with treatment cycle #1.     SUBJECTIVE: The patient is here today by himself.  He is doing fairly well.  Any fever chills night sweats.  He is anxious with the scan results with complaint of mild fatigue.  His breathing is better.    PAST MEDICAL HISTORY/SOCIAL HISTORY/FAMILY HISTORY: Unchanged from my prior documentation done on 08/02/2016.     Review of Systems   Constitutional: Positive for fatigue. Negative for activity change,  appetite change, chills, fever and unexpected weight change.   HENT: Negative for hearing loss, mouth sores, nosebleeds, sore throat and trouble swallowing.    Eyes: Negative for visual disturbance.   Respiratory: Positive for cough, shortness of breath and wheezing. Negative for chest tightness.         With activity   Cardiovascular: Negative for chest pain, palpitations and leg swelling.   Gastrointestinal: Positive for constipation. Negative for abdominal distention, abdominal pain, blood in stool, diarrhea, nausea, rectal pain and vomiting.   Endocrine: Negative for cold intolerance and heat intolerance.   Genitourinary: Negative for difficulty urinating, dysuria, frequency and urgency.   Musculoskeletal: Negative for arthralgias, back pain, gait problem, joint swelling and myalgias.   Skin: Negative for rash.   Neurological: Negative for dizziness, tremors, syncope, weakness, light-headedness, numbness and headaches.   Hematological: Negative for adenopathy. Does not bruise/bleed easily.   Psychiatric/Behavioral: Negative for confusion, sleep disturbance and suicidal ideas. The patient is not nervous/anxious.          Current Outpatient Medications:   •  albuterol (PROVENTIL HFA;VENTOLIN HFA) 108 (90 BASE) MCG/ACT inhaler, Inhale 2 puffs every 4 (four) hours as needed for wheezing or shortness of air., Disp: , Rfl:   •  dexamethasone (DECADRON) 4 MG tablet, Take 1 tablet twice daily starting the day before chemo, day of chemo, and day after chemo.  Take with food., Disp: 30 tablet, Rfl: 3  •  dexamethasone (DECADRON) 4 MG tablet, Take 1 tablet twice daily starting the day before chemo, day of chemo, and day after chemo.  Take with food., Disp: 30 tablet, Rfl: 5  •  folic acid (FOLVITE) 1 MG tablet, Take 1 tablet by mouth Daily. Start at least 7 days prior to chemotherapy until at least 3 weeks after all chemotherapy., Disp: 30 tablet, Rfl: 5  •  guaiFENesin (MUCINEX) 600 MG 12 hr tablet, Take 1 tablet by mouth  "2 (Two) Times a Day., Disp: 60 tablet, Rfl: 5  •  HYDROcodone-acetaminophen (NORCO) 7.5-325 MG per tablet, Take 1 tablet by mouth every 6 (six) hours as needed for moderate pain (4-6)., Disp: , Rfl:   •  lactulose (CHRONULAC) 10 GM/15ML solution, Take 30 mL by mouth 3 (Three) Times a Day. PRN constipation, Disp: 240 mL, Rfl: 2  •  levocetirizine (XYZAL) 5 MG tablet, Take 5 mg by mouth Every Evening., Disp: , Rfl:   •  levothyroxine (SYNTHROID, LEVOTHROID) 75 MCG tablet, Take 75 mcg by mouth Daily., Disp: , Rfl:   •  omeprazole (priLOSEC) 40 MG capsule, Take 40 mg by mouth Daily., Disp: , Rfl:   •  ondansetron (ZOFRAN) 8 MG tablet, Take 1 tablet by mouth 3 (Three) Times a Day As Needed for Nausea or Vomiting., Disp: 30 tablet, Rfl: 5  •  SYMBICORT 160-4.5 MCG/ACT inhaler, INL 2 PUFFS PO BID, Disp: , Rfl: 3  •  tamsulosin (FLOMAX) 0.4 MG capsule 24 hr capsule, Take 1 capsule by mouth Daily., Disp: 30 capsule, Rfl: 6    PHYSICAL EXAMINATION:   /76   Pulse 104   Temp 97.9 °F (36.6 °C) (Temporal)   Resp 18   Ht 167.6 cm (65.98\")   Wt 83 kg (183 lb)   SpO2 97%   BMI 29.55 kg/m²    ECOG Performance Status: 1 - Symptomatic but completely ambulatory  General Appearance:  alert, cooperative, no apparent distress and appears stated age   Neurologic/Psychiatric: A&O x 3, gait steady, appropriate affect, strength 5/5 in all muscle groups   HEENT:  Normocephalic, without obvious abnormality, mucous membranes moist   Neck: Supple, symmetrical, trachea midline, no adenopathy;  No thyromegaly, masses, or tenderness   Lungs:   Clear to auscultation bilaterally; respirations regular, even, and unlabored bilaterally   Heart:  Regular rate and rhythm, no murmurs appreciated   Abdomen:   Soft, non-tender, non-distended and no organomegaly   Lymph nodes: No cervical, supraclavicular, inguinal or axillary adenopathy noted   Extremities: Normal, atraumatic; no clubbing, cyanosis, or edema    Skin: No rashes, ulcers, or " suspicious lesions noted     No visits with results within 2 Week(s) from this visit.   Latest known visit with results is:   Hospital Outpatient Visit on 02/07/2020   Component Date Value Ref Range Status   • Glucose 02/07/2020 184* 65 - 99 mg/dL Final   • BUN 02/07/2020 10  8 - 23 mg/dL Final   • Creatinine 02/07/2020 0.84  0.76 - 1.27 mg/dL Final   • Sodium 02/07/2020 143  136 - 145 mmol/L Final   • Potassium 02/07/2020 3.5  3.5 - 5.2 mmol/L Final   • Chloride 02/07/2020 104  98 - 107 mmol/L Final   • CO2 02/07/2020 24.0  22.0 - 29.0 mmol/L Final   • Calcium 02/07/2020 9.5  8.6 - 10.5 mg/dL Final   • Total Protein 02/07/2020 7.8  6.0 - 8.5 g/dL Final   • Albumin 02/07/2020 4.50  3.50 - 5.20 g/dL Final   • ALT (SGPT) 02/07/2020 26  1 - 41 U/L Final   • AST (SGOT) 02/07/2020 38  1 - 40 U/L Final   • Alkaline Phosphatase 02/07/2020 103  39 - 117 U/L Final   • Total Bilirubin 02/07/2020 0.3  0.2 - 1.2 mg/dL Final   • eGFR Non African Amer 02/07/2020 92  >60 mL/min/1.73 Final   • Globulin 02/07/2020 3.3  gm/dL Final   • A/G Ratio 02/07/2020 1.4  g/dL Final   • BUN/Creatinine Ratio 02/07/2020 11.9  7.0 - 25.0 Final   • Anion Gap 02/07/2020 15.0  5.0 - 15.0 mmol/L Final   • WBC 02/07/2020 5.70  3.40 - 10.80 10*3/mm3 Final   • RBC 02/07/2020 3.84* 4.14 - 5.80 10*6/mm3 Final   • Hemoglobin 02/07/2020 12.2* 13.0 - 17.7 g/dL Final   • Hematocrit 02/07/2020 36.4* 37.5 - 51.0 % Final   • RDW 02/07/2020 22.0* 12.3 - 15.4 % Final   • MCV 02/07/2020 94.6  79.0 - 97.0 fL Final   • MCH 02/07/2020 31.7  26.6 - 33.0 pg Final   • MCHC 02/07/2020 33.5  31.5 - 35.7 g/dL Final   • MPV 02/07/2020 6.7  6.0 - 12.0 fL Final   • Platelets 02/07/2020 174  140 - 450 10*3/mm3 Final   • Neutrophil % 02/07/2020 86.4* 42.7 - 76.0 % Final   • Lymphocyte % 02/07/2020 10.5* 19.6 - 45.3 % Final   • Monocyte % 02/07/2020 3.1* 5.0 - 12.0 % Final   • Neutrophils, Absolute 02/07/2020 4.90  1.70 - 7.00 10*3/mm3 Final   • Lymphocytes, Absolute 02/07/2020  0.60* 0.70 - 3.10 10*3/mm3 Final   • Monocytes, Absolute 02/07/2020 0.20  0.10 - 0.90 10*3/mm3 Final   • Creatinine 02/07/2020 0.80  mg/dL Final   • Creatinine 02/07/2020 0.80  0.60 - 1.30 mg/dL Final    Serial Number: [id number]:  [id number]        Nm Pet Skull Base To Mid Thigh    Result Date: 2/9/2020  Narrative: EXAMINATION: NM PET SKULL BASE TO MID THIGH-  INDICATION: Followup scan, increasing hypermetabolic areas not visible on prior CTs; C34.32-Malignant neoplasm of lower lobe, left bronchus or lung.  TECHNIQUE: With fasting blood glucose level of 177 mg/dl, a total of 12.8 mCi of FDG was administered via the right antecubital vein. Following appropriate delay, PET and CT images were obtained from the level of the skull vertex to the mid thighs and fused multiplanar images reconstructed. The CT scan is an unenhanced low-dose study for reference to the PET scan only and does not constitute a standard diagnostic CT scan.  COMPARISON: None.  FINDINGS: Patient history indicates adenocarcinoma of the lung. The previous PET/CT exam report of 10/25/2019 indicates increasing activity in the right and left lower neck and stable left lung mass and mediastinal nodes.  Today's exam appear significantly improved, with effective resolution of abnormal activity except the dominant left upper lung mass, and this area is markedly improved as well. No new abnormalities are seen on the 3-D series.  Multiplanar images show no significant asymmetry of uptake in the brain. No hypermetabolic node or mass is seen in the neck. In the chest, hypermetabolic activity in the right upper lung mass has decreased from 8.8 to only 3.2. Hypermetabolic activity noted in various neck and mediastinal lymph nodes have resolved. There was a hypermetabolic suprasellar right lower lobe nodule, previously maximal SUV 3.4 today decreased in size, and nonhypermetabolic, maximum SUV 0.7. No new hypermetabolic disease is identified.  Below the diaphragm,  there is heterogeneous activity in the liver, but no hypermetabolic focus. Adrenal glands are nonhypermetabolic . No hypermetabolic node or mass is seen in the abdomen or pelvis. No pathologic marrow space disease is seen.      Impression: Markedly improved PET scan, with significantly reduced activity in the patient's dominant left upper lung mass, and resolution of hypermetabolic disease elsewhere. No new PET scan abnormalities are identified.   D:  02/07/2020 E:  02/07/2020  This report was finalized on 2/9/2020 10:14 PM by Dr. Curtis Lyons MD.    (  ASSESSMENT: The patient is a very pleasant 61 y.o. male with metastatic adenocarcinoma of the lung    PROBLEM LIST:  1. Non-small cell lung cancer originally diagnosed in 2009 status post right upper lobectomy done by Dr. Real in 2009.  2. Left upper lobe lung nodules found on surveillance CT in 2012, status post left upper lobe wedge resection with benign pathology.  3.  Left lung cancer, adenocarcinoma:  A.  Presented as a new left lower lobe lung nodule found on chest x-ray done 6/2016.   B. CT scan of the chest done 6/16/2016 showing interval enlargement of left lung nodules. PET CT showing hypermetabolic activity in the left lower lobe nodule.   C. CT guided biopsy of left lower lobe nodule positive for adenocarcinoma of the lung. [id number], stage Ia.  C. Status post CyberKnife therapy completed 9/29/2016 per Dr. Lesia CHÁVEZ.  Repeated PET scan done on October 2019 revealed increase in size as well as hypermetabolic activity left upper lobe lung nodule with a new hypermetabolic active right supraclavicular lymph node and essentially other stable findings.  E.  Status post bronchoscopy with biopsy done by Dr. You November 6, 2019 revealed adenocarcinoma from level 4 left and suspicious cytology from left upper lobe.  F.  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  No enough tissue to do PDL 1 testing.  G.  Started carbo  Alimta and Keytruda December 04, 2019, status post 3 cycle  4.  Hemoptysis:  A.  Status post venous embolization done by Dr. Camp November 7, 2019  B.  Completed by radiation to left upper lobe mass November 26, 2019  4.  Black lung  5. Sarcoidosis  6. Hypothyroidism  7.  Upper airway infection  8.  Treatment induced nausea    PLAN:  1.  I reviewed the PET scan results with the patient. I reviewed the image myself as well as with him.  I compare the current study to previous study I told the patient he has had good response to treatment with decreased activity in both the left upper lobe as well as the right supraclavicular lymph node with no new areas of disease.   2. We will proceed today using Alimta/Keytruda cycle #1 as planned.   3. We will see the patient back in 3 weeks for maintenance regimen using Alimta/Keytruda cycle #2.    4. We will monitor the patient's labs throughout treatment including blood counts, kidney function, thyroid function, and liver functions.   5.  We reviewed again the potential side effects of immunotherapy including but not limited to immune mediated reactions with thyroiditis, pneumonitis, hepatitis, colitis, rash, and electrolytes abnormalities, fatigue, multiorgan failure, and possibly death.  6. We reviewed again the potential side effects of this regimen including fatigue, vomiting and nausea, hair loss, nephropathy, neuropathy, hearing loss, myelosuppression, and risk of infusion reaction.  7.  The patient can continue Mucinex 600 mg take by mouth twice per day as needed for congestion and cough.   8. He will continue levothyroxine 75 mcg for hypothyroidism. We will adjust his dose if needed for fluctuations in TSH.   9.  We will continue Zofran as needed for chemotherapy-induced nausea.  10.  We will continue folic acid, vitamin B12, and Decadron per Alimta protocol.  11. The patient will continue Miralax Senakot and Lactulose for constipation.     Padmaja Denny,  MD  2/28/2020

## 2020-03-20 ENCOUNTER — OFFICE VISIT (OUTPATIENT)
Dept: ONCOLOGY | Facility: CLINIC | Age: 65
End: 2020-03-20

## 2020-03-20 ENCOUNTER — HOSPITAL ENCOUNTER (OUTPATIENT)
Dept: ONCOLOGY | Facility: HOSPITAL | Age: 65
Setting detail: INFUSION SERIES
Discharge: HOME | End: 2020-03-20

## 2020-03-20 VITALS
RESPIRATION RATE: 18 BRPM | BODY MASS INDEX: 29.73 KG/M2 | OXYGEN SATURATION: 96 % | WEIGHT: 185 LBS | HEIGHT: 66 IN | DIASTOLIC BLOOD PRESSURE: 84 MMHG | HEART RATE: 65 BPM | TEMPERATURE: 97.4 F | SYSTOLIC BLOOD PRESSURE: 162 MMHG

## 2020-03-20 DIAGNOSIS — C34.12 MALIGNANT NEOPLASM OF UPPER LOBE OF LEFT LUNG (HCC): ICD-10-CM

## 2020-03-20 DIAGNOSIS — C34.32 MALIGNANT NEOPLASM OF LOWER LOBE OF LEFT LUNG (HCC): Primary | ICD-10-CM

## 2020-03-20 DIAGNOSIS — C34.12 MALIGNANT NEOPLASM OF UPPER LOBE OF LEFT LUNG (HCC): Primary | ICD-10-CM

## 2020-03-20 LAB
ALBUMIN SERPL-MCNC: 4.1 G/DL (ref 3.5–5.2)
ALBUMIN/GLOB SERPL: 1.3 G/DL
ALP SERPL-CCNC: 91 U/L (ref 39–117)
ALT SERPL W P-5'-P-CCNC: 18 U/L (ref 1–41)
ANION GAP SERPL CALCULATED.3IONS-SCNC: 12 MMOL/L (ref 5–15)
AST SERPL-CCNC: 39 U/L (ref 1–40)
BILIRUB SERPL-MCNC: 0.3 MG/DL (ref 0.2–1.2)
BUN BLD-MCNC: 7 MG/DL (ref 8–23)
BUN/CREAT SERPL: 7.6 (ref 7–25)
CALCIUM SPEC-SCNC: 8.9 MG/DL (ref 8.6–10.5)
CHLORIDE SERPL-SCNC: 105 MMOL/L (ref 98–107)
CO2 SERPL-SCNC: 24 MMOL/L (ref 22–29)
CREAT BLD-MCNC: 0.92 MG/DL (ref 0.76–1.27)
CREAT BLDA-MCNC: 0.9 MG/DL (ref 0.6–1.3)
CREAT SERPL-MCNC: 0.9 MG/DL
ERYTHROCYTE [DISTWIDTH] IN BLOOD BY AUTOMATED COUNT: 19.9 % (ref 12.3–15.4)
GFR SERPL CREATININE-BSD FRML MDRD: 83 ML/MIN/1.73
GLOBULIN UR ELPH-MCNC: 3.1 GM/DL
GLUCOSE BLD-MCNC: 138 MG/DL (ref 65–99)
HCT VFR BLD AUTO: 36.1 % (ref 37.5–51)
HGB BLD-MCNC: 11.9 G/DL (ref 13–17.7)
LYMPHOCYTES # BLD AUTO: 0.3 10*3/MM3 (ref 0.7–3.1)
LYMPHOCYTES NFR BLD AUTO: 6.4 % (ref 19.6–45.3)
MCH RBC QN AUTO: 32.7 PG (ref 26.6–33)
MCHC RBC AUTO-ENTMCNC: 33 G/DL (ref 31.5–35.7)
MCV RBC AUTO: 98.9 FL (ref 79–97)
MONOCYTES # BLD AUTO: 0.2 10*3/MM3 (ref 0.1–0.9)
MONOCYTES NFR BLD AUTO: 4.4 % (ref 5–12)
NEUTROPHILS # BLD AUTO: 4 10*3/MM3 (ref 1.7–7)
NEUTROPHILS NFR BLD AUTO: 89.2 % (ref 42.7–76)
PLATELET # BLD AUTO: 273 10*3/MM3 (ref 140–450)
PMV BLD AUTO: 7.1 FL (ref 6–12)
POTASSIUM BLD-SCNC: 3.9 MMOL/L (ref 3.5–5.2)
PROT SERPL-MCNC: 7.2 G/DL (ref 6–8.5)
RBC # BLD AUTO: 3.65 10*6/MM3 (ref 4.14–5.8)
SODIUM BLD-SCNC: 141 MMOL/L (ref 136–145)
WBC NRBC COR # BLD: 4.5 10*3/MM3 (ref 3.4–10.8)

## 2020-03-20 PROCEDURE — 25010000002 DEXAMETHASONE PER 1 MG: Performed by: NURSE PRACTITIONER

## 2020-03-20 PROCEDURE — 96411 CHEMO IV PUSH ADDL DRUG: CPT

## 2020-03-20 PROCEDURE — 96413 CHEMO IV INFUSION 1 HR: CPT

## 2020-03-20 PROCEDURE — 96409 CHEMO IV PUSH SNGL DRUG: CPT

## 2020-03-20 PROCEDURE — 25010000002 PEMETREXED 100 MG RECONSTITUTED SOLUTION 100 MG VIAL: Performed by: NURSE PRACTITIONER

## 2020-03-20 PROCEDURE — 96372 THER/PROPH/DIAG INJ SC/IM: CPT

## 2020-03-20 PROCEDURE — 99214 OFFICE O/P EST MOD 30 MIN: CPT | Performed by: NURSE PRACTITIONER

## 2020-03-20 PROCEDURE — 96375 TX/PRO/DX INJ NEW DRUG ADDON: CPT

## 2020-03-20 PROCEDURE — 25010000002 CYANOCOBALAMIN PER 1000 MCG: Performed by: NURSE PRACTITIONER

## 2020-03-20 PROCEDURE — 25010000002 PEMBROLIZUMAB 100 MG/4ML SOLUTION 4 ML VIAL: Performed by: NURSE PRACTITIONER

## 2020-03-20 PROCEDURE — 82565 ASSAY OF CREATININE: CPT

## 2020-03-20 PROCEDURE — 80053 COMPREHEN METABOLIC PANEL: CPT | Performed by: NURSE PRACTITIONER

## 2020-03-20 PROCEDURE — 85025 COMPLETE CBC W/AUTO DIFF WBC: CPT | Performed by: NURSE PRACTITIONER

## 2020-03-20 PROCEDURE — 25010000002 PEMETREXED PER 10 MG: Performed by: NURSE PRACTITIONER

## 2020-03-20 RX ORDER — SODIUM CHLORIDE 9 MG/ML
250 INJECTION, SOLUTION INTRAVENOUS ONCE
Status: CANCELLED | OUTPATIENT
Start: 2020-03-20

## 2020-03-20 RX ORDER — SODIUM CHLORIDE 9 MG/ML
250 INJECTION, SOLUTION INTRAVENOUS ONCE
Status: DISCONTINUED | OUTPATIENT
Start: 2020-03-20 | End: 2020-03-21 | Stop reason: HOSPADM

## 2020-03-20 RX ORDER — DEXAMETHASONE SODIUM PHOSPHATE 4 MG/ML
8 INJECTION, SOLUTION INTRA-ARTICULAR; INTRALESIONAL; INTRAMUSCULAR; INTRAVENOUS; SOFT TISSUE ONCE
Status: CANCELLED
Start: 2020-03-20

## 2020-03-20 RX ORDER — CYANOCOBALAMIN 1000 UG/ML
1000 INJECTION, SOLUTION INTRAMUSCULAR; SUBCUTANEOUS ONCE
Status: COMPLETED | OUTPATIENT
Start: 2020-03-20 | End: 2020-03-20

## 2020-03-20 RX ORDER — DEXAMETHASONE SODIUM PHOSPHATE 4 MG/ML
8 INJECTION, SOLUTION INTRA-ARTICULAR; INTRALESIONAL; INTRAMUSCULAR; INTRAVENOUS; SOFT TISSUE ONCE
Status: COMPLETED | OUTPATIENT
Start: 2020-03-20 | End: 2020-03-20

## 2020-03-20 RX ORDER — CYANOCOBALAMIN 1000 UG/ML
1000 INJECTION, SOLUTION INTRAMUSCULAR; SUBCUTANEOUS ONCE
Status: CANCELLED | OUTPATIENT
Start: 2020-03-20

## 2020-03-20 RX ADMIN — SODIUM CHLORIDE 900 MG: 9 INJECTION, SOLUTION INTRAVENOUS at 12:04

## 2020-03-20 RX ADMIN — SODIUM CHLORIDE 200 MG: 9 INJECTION, SOLUTION INTRAVENOUS at 11:22

## 2020-03-20 RX ADMIN — CYANOCOBALAMIN 1000 MCG: 1000 INJECTION, SOLUTION INTRAMUSCULAR; SUBCUTANEOUS at 12:16

## 2020-03-20 RX ADMIN — DEXAMETHASONE SODIUM PHOSPHATE 8 MG: 4 INJECTION, SOLUTION INTRAMUSCULAR; INTRAVENOUS at 11:04

## 2020-03-20 NOTE — PROGRESS NOTES
DATE OF VISIT: 3/20/2020    REASON FOR VISIT: Recurrent adenocarcinoma of the lung       HISTORY OF PRESENT ILLNESS: The patient is a very pleasant 64 y.o. male  with past medical history significant for lung cancer diagnosed 6/22/2016 . 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 PET scan done 6/22/2016 showed findings consistent with recurrent neoplasm in the left upper lobe, with metastatic adenopathy to the left hilum and AP window as well as a metastatic nodule in the left lower lobe. CT-guided biopsy of the left lower lobe nodule was performed that showed adenocarcinoma of the lung. The patient underwent CyberKnife therapy by Dr Pinzon completed 9/29/2016.  Repeat scan showed gradual increase in size of left upper lobe lung infiltrates.  Patient had whole-body PET scan that revealed increased metabolic activity in the left lung infiltrate as well as a new right supraclavicular lymph nodes.  Patient had bronchoscopy with biopsy done by Dr. You on November 6, 2019 that revealed adenocarcinoma and level 4 left mediastinal lymph node.  He received palliative course of radiation to the left upper lobe secondary to persistent hemoptysis followed by palliative treatment with carboplatin Alimta and Keytruda started December 4, 2019.  Completed 4 cycles on February 7, 2020.  The patient was started on Alimta with Keytruda on February 28, 2020.  The patient is here today for scheduled follow up visit with treatment cycle #2.     SUBJECTIVE: The patient is here today by himself. He has been doing fairly well. He has had no nausea or vomiting. He denies diarrhea or skin rash. He does have some shortness of breath with activity, however it resolves easily with rest. He denies fever or chills.  He forgot to take his Decadron yesterday for pre-medication for treatment today.  He has noted some change in stool color over the last week  and a half, light in color than normal.     PAST MEDICAL HISTORY/SOCIAL HISTORY/FAMILY HISTORY: Unchanged from my prior documentation done on 08/02/2016.     Review of Systems   Constitutional: Positive for fatigue. Negative for activity change, appetite change, chills, fever and unexpected weight change.   HENT: Negative for hearing loss, mouth sores, nosebleeds, sore throat and trouble swallowing.    Eyes: Negative for visual disturbance.   Respiratory: Positive for cough and shortness of breath. Negative for chest tightness and wheezing.         With activity   Cardiovascular: Negative for chest pain, palpitations and leg swelling.   Gastrointestinal: Negative for abdominal distention, abdominal pain, blood in stool, constipation, diarrhea, nausea, rectal pain and vomiting.        Light colored stool   Endocrine: Negative for cold intolerance and heat intolerance.   Genitourinary: Negative for difficulty urinating, dysuria, frequency and urgency.   Musculoskeletal: Negative for arthralgias, back pain, gait problem, joint swelling and myalgias.   Skin: Negative for rash.   Neurological: Negative for dizziness, tremors, syncope, weakness, light-headedness, numbness and headaches.   Hematological: Negative for adenopathy. Does not bruise/bleed easily.   Psychiatric/Behavioral: Negative for confusion, sleep disturbance and suicidal ideas. The patient is not nervous/anxious.          Current Outpatient Medications:   •  albuterol (PROVENTIL HFA;VENTOLIN HFA) 108 (90 BASE) MCG/ACT inhaler, Inhale 2 puffs every 4 (four) hours as needed for wheezing or shortness of air., Disp: , Rfl:   •  dexamethasone (DECADRON) 4 MG tablet, Take 1 tablet twice daily starting the day before chemo, day of chemo, and day after chemo.  Take with food., Disp: 30 tablet, Rfl: 3  •  dexamethasone (DECADRON) 4 MG tablet, Take 1 tablet twice daily starting the day before chemo, day of chemo, and day after chemo.  Take with food., Disp: 30  "tablet, Rfl: 5  •  folic acid (FOLVITE) 1 MG tablet, Take 1 tablet by mouth Daily. Start at least 7 days prior to chemotherapy until at least 3 weeks after all chemotherapy., Disp: 30 tablet, Rfl: 5  •  guaiFENesin (MUCINEX) 600 MG 12 hr tablet, Take 1 tablet by mouth 2 (Two) Times a Day., Disp: 60 tablet, Rfl: 5  •  HYDROcodone-acetaminophen (NORCO) 7.5-325 MG per tablet, Take 1 tablet by mouth every 6 (six) hours as needed for moderate pain (4-6)., Disp: , Rfl:   •  lactulose (CHRONULAC) 10 GM/15ML solution, Take 30 mL by mouth 3 (Three) Times a Day. PRN constipation, Disp: 240 mL, Rfl: 2  •  levocetirizine (XYZAL) 5 MG tablet, Take 5 mg by mouth Every Evening., Disp: , Rfl:   •  levothyroxine (SYNTHROID, LEVOTHROID) 75 MCG tablet, Take 75 mcg by mouth Daily., Disp: , Rfl:   •  omeprazole (priLOSEC) 40 MG capsule, Take 40 mg by mouth Daily., Disp: , Rfl:   •  ondansetron (ZOFRAN) 8 MG tablet, Take 1 tablet by mouth 3 (Three) Times a Day As Needed for Nausea or Vomiting., Disp: 30 tablet, Rfl: 5  •  SYMBICORT 160-4.5 MCG/ACT inhaler, INL 2 PUFFS PO BID, Disp: , Rfl: 3  •  tamsulosin (FLOMAX) 0.4 MG capsule 24 hr capsule, Take 1 capsule by mouth Daily., Disp: 30 capsule, Rfl: 6    PHYSICAL EXAMINATION:   /84   Pulse 65   Temp 97.4 °F (36.3 °C) (Temporal)   Resp 18   Ht 167.6 cm (65.98\")   Wt 83.9 kg (185 lb)   SpO2 96%   BMI 29.87 kg/m²    Pain Score    03/20/20 0947   PainSc: 0-No pain      ECOG Performance Status: 1 - Symptomatic but completely ambulatory  General Appearance:  alert, cooperative, no apparent distress and appears stated age   Neurologic/Psychiatric: A&O x 3, gait steady, appropriate affect, strength 5/5 in all muscle groups   HEENT:  Normocephalic, without obvious abnormality, mucous membranes moist   Neck: Supple, symmetrical, trachea midline, no adenopathy;  No thyromegaly, masses, or tenderness   Lungs:   Clear to auscultation bilaterally; respirations regular, even, and unlabored " bilaterally   Heart:  Regular rate and rhythm, no murmurs appreciated   Abdomen:   Soft, non-tender, non-distended and no organomegaly   Lymph nodes: No cervical, supraclavicular, inguinal or axillary adenopathy noted   Extremities: Normal, atraumatic; no clubbing, cyanosis, or edema    Skin: No rashes, ulcers, or suspicious lesions noted     No visits with results within 2 Week(s) from this visit.   Latest known visit with results is:   Hospital Outpatient Visit on 02/28/2020   Component Date Value Ref Range Status   • Glucose 02/28/2020 227* 65 - 99 mg/dL Final   • BUN 02/28/2020 10  8 - 23 mg/dL Final   • Creatinine 02/28/2020 0.95  0.76 - 1.27 mg/dL Final   • Sodium 02/28/2020 137  136 - 145 mmol/L Final   • Potassium 02/28/2020 3.5  3.5 - 5.2 mmol/L Final   • Chloride 02/28/2020 100  98 - 107 mmol/L Final   • CO2 02/28/2020 22.0  22.0 - 29.0 mmol/L Final   • Calcium 02/28/2020 8.8  8.6 - 10.5 mg/dL Final   • Total Protein 02/28/2020 7.3  6.0 - 8.5 g/dL Final   • Albumin 02/28/2020 4.30  3.50 - 5.20 g/dL Final   • ALT (SGPT) 02/28/2020 24  1 - 41 U/L Final   • AST (SGOT) 02/28/2020 34  1 - 40 U/L Final   • Alkaline Phosphatase 02/28/2020 99  39 - 117 U/L Final   • Total Bilirubin 02/28/2020 0.3  0.2 - 1.2 mg/dL Final   • eGFR Non African Amer 02/28/2020 80  >60 mL/min/1.73 Final   • Globulin 02/28/2020 3.0  gm/dL Final   • A/G Ratio 02/28/2020 1.4  g/dL Final   • BUN/Creatinine Ratio 02/28/2020 10.5  7.0 - 25.0 Final   • Anion Gap 02/28/2020 15.0  5.0 - 15.0 mmol/L Final   • TSH 02/28/2020 3.370  0.270 - 4.200 uIU/mL Final   • Free T4 02/28/2020 0.98  0.93 - 1.70 ng/dL Final   • WBC 02/28/2020 4.30  3.40 - 10.80 10*3/mm3 Final   • RBC 02/28/2020 3.53* 4.14 - 5.80 10*6/mm3 Final   • Hemoglobin 02/28/2020 11.5* 13.0 - 17.7 g/dL Final   • Hematocrit 02/28/2020 34.1* 37.5 - 51.0 % Final   • RDW 02/28/2020 23.3* 12.3 - 15.4 % Final   • MCV 02/28/2020 96.8  79.0 - 97.0 fL Final   • MCH 02/28/2020 32.8  26.6 - 33.0 pg  Final   • MCHC 02/28/2020 33.9  31.5 - 35.7 g/dL Final   • MPV 02/28/2020 6.5  6.0 - 12.0 fL Final   • Platelets 02/28/2020 192  140 - 450 10*3/mm3 Final   • Neutrophil % 02/28/2020 87.8* 42.7 - 76.0 % Final   • Lymphocyte % 02/28/2020 7.6* 19.6 - 45.3 % Final   • Monocyte % 02/28/2020 4.6* 5.0 - 12.0 % Final   • Neutrophils, Absolute 02/28/2020 3.80  1.70 - 7.00 10*3/mm3 Final   • Lymphocytes, Absolute 02/28/2020 0.30* 0.70 - 3.10 10*3/mm3 Final   • Monocytes, Absolute 02/28/2020 0.20  0.10 - 0.90 10*3/mm3 Final   • Creatinine 02/28/2020 0.90  0.60 - 1.30 mg/dL Final    Serial Number: [id number]:  [id number]        No results found.(  ASSESSMENT: The patient is a very pleasant 61 y.o. male with metastatic adenocarcinoma of the lung    PROBLEM LIST:  1. Non-small cell lung cancer originally diagnosed in 2009 status post right upper lobectomy done by Dr. Real in 2009.  2. Left upper lobe lung nodules found on surveillance CT in 2012, status post left upper lobe wedge resection with benign pathology.  3.  Left lung cancer, adenocarcinoma:  A.  Presented as a new left lower lobe lung nodule found on chest x-ray done 6/2016.   B. CT scan of the chest done 6/16/2016 showing interval enlargement of left lung nodules. PET CT showing hypermetabolic activity in the left lower lobe nodule.   C. CT guided biopsy of left lower lobe nodule positive for adenocarcinoma of the lung. [id number], stage Ia.  C. Status post CyberKnife therapy completed 9/29/2016 per Dr. Lesia CHÁVEZ.  Repeated PET scan done on October 2019 revealed increase in size as well as hypermetabolic activity left upper lobe lung nodule with a new hypermetabolic active right supraclavicular lymph node and essentially other stable findings.  E.  Status post bronchoscopy with biopsy done by Dr. You November 6, 2019 revealed adenocarcinoma from level 4 left and suspicious cytology from left upper lobe.  F.  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  No enough tissue to do PDL 1 testing.  G.  Started carbo Alimta and Keytruda December 04, 2019, status post 4 cycle  H. Started maintenance Alimta/Keytruda on 2/28/2020.   4.  Hemoptysis:  A.  Status post venous embolization done by Dr. Camp November 7, 2019  B.  Completed by radiation to left upper lobe mass November 26, 2019  4.  Black lung  5. Sarcoidosis  6. Hypothyroidism  7.  Upper airway infection  8.  Treatment induced nausea    PLAN:  1. We will proceed today using Alimta/Keytruda cycle #2 as planned.   2. We will see the patient back in 3 weeks for maintenance regimen using Alimta/Keytruda cycle #3.    3. We will continue to monitor the patient's labs throughout treatment including blood counts, kidney function, thyroid function, and liver functions.   4.  We reviewed again the potential side effects of immunotherapy including but not limited to immune mediated reactions with thyroiditis, pneumonitis, hepatitis, colitis, rash, and electrolytes abnormalities, fatigue, multiorgan failure, and possibly death.  5. We reviewed again the potential side effects of this regimen including fatigue, vomiting and nausea, hair loss, nephropathy, neuropathy, hearing loss, myelosuppression, and risk of infusion reaction.  6. We will plan to repeat CT scans in 3 months which will be due 5/7/2020.   7.  The patient can continue Mucinex 600 mg take by mouth twice per day as needed for congestion and cough.   8. He will continue levothyroxine 75 mcg for hypothyroidism. We will adjust his dose if needed for fluctuations in TSH.   9.  We will continue Zofran as needed for chemotherapy-induced nausea.  10.  We will continue folic acid, vitamin B12, and Decadron per Alimta protocol.  11. The patient will continue Miralax Senakot and Lactulose for constipation.     Snehal Stallworth, APRN  3/20/2020

## 2020-04-10 ENCOUNTER — HOSPITAL ENCOUNTER (OUTPATIENT)
Dept: ONCOLOGY | Facility: HOSPITAL | Age: 65
Setting detail: INFUSION SERIES
Discharge: HOME | End: 2020-04-10

## 2020-04-10 ENCOUNTER — OFFICE VISIT (OUTPATIENT)
Dept: ONCOLOGY | Facility: CLINIC | Age: 65
End: 2020-04-10

## 2020-04-10 VITALS
WEIGHT: 187 LBS | BODY MASS INDEX: 30.05 KG/M2 | RESPIRATION RATE: 18 BRPM | OXYGEN SATURATION: 98 % | HEIGHT: 66 IN | TEMPERATURE: 97.8 F | DIASTOLIC BLOOD PRESSURE: 78 MMHG | HEART RATE: 83 BPM | SYSTOLIC BLOOD PRESSURE: 151 MMHG

## 2020-04-10 DIAGNOSIS — C34.12 MALIGNANT NEOPLASM OF UPPER LOBE OF LEFT LUNG (HCC): Primary | ICD-10-CM

## 2020-04-10 DIAGNOSIS — C34.12 MALIGNANT NEOPLASM OF UPPER LOBE OF LEFT LUNG (HCC): ICD-10-CM

## 2020-04-10 DIAGNOSIS — C34.32 MALIGNANT NEOPLASM OF LOWER LOBE OF LEFT LUNG (HCC): Primary | ICD-10-CM

## 2020-04-10 LAB
ALBUMIN SERPL-MCNC: 4.3 G/DL (ref 3.5–5.2)
ALBUMIN/GLOB SERPL: 1.3 G/DL
ALP SERPL-CCNC: 86 U/L (ref 39–117)
ALT SERPL W P-5'-P-CCNC: 21 U/L (ref 1–41)
ANION GAP SERPL CALCULATED.3IONS-SCNC: 11 MMOL/L (ref 5–15)
AST SERPL-CCNC: 35 U/L (ref 1–40)
BILIRUB SERPL-MCNC: 0.3 MG/DL (ref 0.2–1.2)
BUN BLD-MCNC: 10 MG/DL (ref 8–23)
BUN/CREAT SERPL: 11.9 (ref 7–25)
CALCIUM SPEC-SCNC: 9.2 MG/DL (ref 8.6–10.5)
CHLORIDE SERPL-SCNC: 106 MMOL/L (ref 98–107)
CO2 SERPL-SCNC: 23 MMOL/L (ref 22–29)
CREAT BLD-MCNC: 0.84 MG/DL (ref 0.76–1.27)
CREAT BLDA-MCNC: 0.7 MG/DL (ref 0.6–1.3)
ERYTHROCYTE [DISTWIDTH] IN BLOOD BY AUTOMATED COUNT: 16.5 % (ref 12.3–15.4)
GFR SERPL CREATININE-BSD FRML MDRD: 92 ML/MIN/1.73
GLOBULIN UR ELPH-MCNC: 3.3 GM/DL
GLUCOSE BLD-MCNC: 152 MG/DL (ref 65–99)
HCT VFR BLD AUTO: 35.3 % (ref 37.5–51)
HGB BLD-MCNC: 11.9 G/DL (ref 13–17.7)
LYMPHOCYTES # BLD AUTO: 0.4 10*3/MM3 (ref 0.7–3.1)
LYMPHOCYTES NFR BLD AUTO: 8.6 % (ref 19.6–45.3)
MCH RBC QN AUTO: 33.9 PG (ref 26.6–33)
MCHC RBC AUTO-ENTMCNC: 33.8 G/DL (ref 31.5–35.7)
MCV RBC AUTO: 100.2 FL (ref 79–97)
MONOCYTES # BLD AUTO: 0.3 10*3/MM3 (ref 0.1–0.9)
MONOCYTES NFR BLD AUTO: 6.7 % (ref 5–12)
NEUTROPHILS # BLD AUTO: 3.9 10*3/MM3 (ref 1.7–7)
NEUTROPHILS NFR BLD AUTO: 84.7 % (ref 42.7–76)
PLATELET # BLD AUTO: 295 10*3/MM3 (ref 140–450)
PMV BLD AUTO: 6.7 FL (ref 6–12)
POTASSIUM BLD-SCNC: 3.3 MMOL/L (ref 3.5–5.2)
PROT SERPL-MCNC: 7.6 G/DL (ref 6–8.5)
RBC # BLD AUTO: 3.52 10*6/MM3 (ref 4.14–5.8)
SODIUM BLD-SCNC: 140 MMOL/L (ref 136–145)
T4 FREE SERPL-MCNC: 1.25 NG/DL (ref 0.93–1.7)
TSH SERPL DL<=0.05 MIU/L-ACNC: 0.94 UIU/ML (ref 0.27–4.2)
WBC NRBC COR # BLD: 4.6 10*3/MM3 (ref 3.4–10.8)

## 2020-04-10 PROCEDURE — 96413 CHEMO IV INFUSION 1 HR: CPT

## 2020-04-10 PROCEDURE — 82565 ASSAY OF CREATININE: CPT

## 2020-04-10 PROCEDURE — 25010000002 PEMETREXED PER 10 MG: Performed by: INTERNAL MEDICINE

## 2020-04-10 PROCEDURE — 84439 ASSAY OF FREE THYROXINE: CPT | Performed by: INTERNAL MEDICINE

## 2020-04-10 PROCEDURE — 96411 CHEMO IV PUSH ADDL DRUG: CPT

## 2020-04-10 PROCEDURE — 80053 COMPREHEN METABOLIC PANEL: CPT | Performed by: INTERNAL MEDICINE

## 2020-04-10 PROCEDURE — 96409 CHEMO IV PUSH SNGL DRUG: CPT

## 2020-04-10 PROCEDURE — 85025 COMPLETE CBC W/AUTO DIFF WBC: CPT | Performed by: INTERNAL MEDICINE

## 2020-04-10 PROCEDURE — 99214 OFFICE O/P EST MOD 30 MIN: CPT | Performed by: INTERNAL MEDICINE

## 2020-04-10 PROCEDURE — 25010000002 PEMETREXED 100 MG RECONSTITUTED SOLUTION 100 MG VIAL: Performed by: INTERNAL MEDICINE

## 2020-04-10 PROCEDURE — 25010000002 PEMBROLIZUMAB 100 MG/4ML SOLUTION 4 ML VIAL: Performed by: INTERNAL MEDICINE

## 2020-04-10 PROCEDURE — 36416 COLLJ CAPILLARY BLOOD SPEC: CPT

## 2020-04-10 PROCEDURE — 84443 ASSAY THYROID STIM HORMONE: CPT | Performed by: INTERNAL MEDICINE

## 2020-04-10 RX ORDER — CYANOCOBALAMIN 1000 UG/ML
1000 INJECTION, SOLUTION INTRAMUSCULAR; SUBCUTANEOUS ONCE
Status: CANCELLED | OUTPATIENT
Start: 2020-05-22

## 2020-04-10 RX ORDER — SODIUM CHLORIDE 9 MG/ML
250 INJECTION, SOLUTION INTRAVENOUS ONCE
Status: CANCELLED | OUTPATIENT
Start: 2020-05-01

## 2020-04-10 RX ORDER — SODIUM CHLORIDE 9 MG/ML
250 INJECTION, SOLUTION INTRAVENOUS ONCE
Status: CANCELLED | OUTPATIENT
Start: 2020-05-22

## 2020-04-10 RX ORDER — SODIUM CHLORIDE 9 MG/ML
250 INJECTION, SOLUTION INTRAVENOUS ONCE
Status: COMPLETED | OUTPATIENT
Start: 2020-04-10 | End: 2020-04-10

## 2020-04-10 RX ORDER — SODIUM CHLORIDE 9 MG/ML
250 INJECTION, SOLUTION INTRAVENOUS ONCE
Status: CANCELLED | OUTPATIENT
Start: 2020-04-10

## 2020-04-10 RX ADMIN — SODIUM CHLORIDE 250 ML: 9 INJECTION, SOLUTION INTRAVENOUS at 09:27

## 2020-04-10 RX ADMIN — SODIUM CHLORIDE 900 MG: 9 INJECTION, SOLUTION INTRAVENOUS at 10:17

## 2020-04-10 RX ADMIN — SODIUM CHLORIDE 200 MG: 9 INJECTION, SOLUTION INTRAVENOUS at 09:27

## 2020-04-10 NOTE — PROGRESS NOTES
DATE OF VISIT: 4/10/2020    REASON FOR VISIT: Recurrent adenocarcinoma of the lung       HISTORY OF PRESENT ILLNESS: The patient is a very pleasant 64 y.o. male  with past medical history significant for lung cancer diagnosed 6/22/2016 . 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 PET scan done 6/22/2016 showed findings consistent with recurrent neoplasm in the left upper lobe, with metastatic adenopathy to the left hilum and AP window as well as a metastatic nodule in the left lower lobe. CT-guided biopsy of the left lower lobe nodule was performed that showed adenocarcinoma of the lung. The patient underwent CyberKnife therapy by Dr Pinzon completed 9/29/2016.  Repeat scan showed gradual increase in size of left upper lobe lung infiltrates.  Patient had whole-body PET scan that revealed increased metabolic activity in the left lung infiltrate as well as a new right supraclavicular lymph nodes.  Patient had bronchoscopy with biopsy done by Dr. You on November 6, 2019 that revealed adenocarcinoma and level 4 left mediastinal lymph node.  He received palliative course of radiation to the left upper lobe secondary to persistent hemoptysis followed by palliative treatment with carboplatin Alimta and Keytruda started December 4, 2019.  Completed 4 cycles on February 7, 2020.  The patient was started on Alimta with Keytruda on February 28, 2020.  The patient is here today for scheduled follow up visit with treatment cycle #3.     SUBJECTIVE: The patient is here today by himself.  He is doing fairly well he has been able to tolerate chemotherapy without any serious side effects denies any fever chills no night sweats.    PAST MEDICAL HISTORY/SOCIAL HISTORY/FAMILY HISTORY: Unchanged from my prior documentation done on 08/02/2016.     Review of Systems   Constitutional: Positive for fatigue. Negative for activity change, appetite  change, chills, fever and unexpected weight change.   HENT: Negative for hearing loss, mouth sores, nosebleeds, sore throat and trouble swallowing.    Eyes: Negative for visual disturbance.   Respiratory: Positive for cough and shortness of breath. Negative for chest tightness and wheezing.         With activity   Cardiovascular: Negative for chest pain, palpitations and leg swelling.   Gastrointestinal: Negative for abdominal distention, abdominal pain, blood in stool, constipation, diarrhea, nausea, rectal pain and vomiting.        Light colored stool   Endocrine: Negative for cold intolerance and heat intolerance.   Genitourinary: Negative for difficulty urinating, dysuria, frequency and urgency.   Musculoskeletal: Negative for arthralgias, back pain, gait problem, joint swelling and myalgias.   Skin: Negative for rash.   Neurological: Negative for dizziness, tremors, syncope, weakness, light-headedness, numbness and headaches.   Hematological: Negative for adenopathy. Does not bruise/bleed easily.   Psychiatric/Behavioral: Negative for confusion, sleep disturbance and suicidal ideas. The patient is not nervous/anxious.          Current Outpatient Medications:   •  albuterol (PROVENTIL HFA;VENTOLIN HFA) 108 (90 BASE) MCG/ACT inhaler, Inhale 2 puffs every 4 (four) hours as needed for wheezing or shortness of air., Disp: , Rfl:   •  dexamethasone (DECADRON) 4 MG tablet, Take 1 tablet twice daily starting the day before chemo, day of chemo, and day after chemo.  Take with food., Disp: 30 tablet, Rfl: 3  •  dexamethasone (DECADRON) 4 MG tablet, Take 1 tablet twice daily starting the day before chemo, day of chemo, and day after chemo.  Take with food., Disp: 30 tablet, Rfl: 5  •  folic acid (FOLVITE) 1 MG tablet, Take 1 tablet by mouth Daily. Start at least 7 days prior to chemotherapy until at least 3 weeks after all chemotherapy., Disp: 30 tablet, Rfl: 5  •  guaiFENesin (MUCINEX) 600 MG 12 hr tablet, Take 1 tablet  "by mouth 2 (Two) Times a Day., Disp: 60 tablet, Rfl: 5  •  HYDROcodone-acetaminophen (NORCO) 7.5-325 MG per tablet, Take 1 tablet by mouth every 6 (six) hours as needed for moderate pain (4-6)., Disp: , Rfl:   •  lactulose (CHRONULAC) 10 GM/15ML solution, Take 30 mL by mouth 3 (Three) Times a Day. PRN constipation, Disp: 240 mL, Rfl: 2  •  levocetirizine (XYZAL) 5 MG tablet, Take 5 mg by mouth Every Evening., Disp: , Rfl:   •  levothyroxine (SYNTHROID, LEVOTHROID) 75 MCG tablet, Take 75 mcg by mouth Daily., Disp: , Rfl:   •  omeprazole (priLOSEC) 40 MG capsule, Take 40 mg by mouth Daily., Disp: , Rfl:   •  ondansetron (ZOFRAN) 8 MG tablet, Take 1 tablet by mouth 3 (Three) Times a Day As Needed for Nausea or Vomiting., Disp: 30 tablet, Rfl: 5  •  SYMBICORT 160-4.5 MCG/ACT inhaler, INL 2 PUFFS PO BID, Disp: , Rfl: 3  •  tamsulosin (FLOMAX) 0.4 MG capsule 24 hr capsule, Take 1 capsule by mouth Daily., Disp: 30 capsule, Rfl: 6    PHYSICAL EXAMINATION:   /78   Pulse 83   Temp 97.8 °F (36.6 °C) (Temporal)   Resp 18   Ht 167.6 cm (65.98\")   Wt 84.8 kg (187 lb)   SpO2 98%   BMI 30.20 kg/m²    Pain Score    04/10/20 0819   PainSc: 0-No pain      ECOG Performance Status: 1 - Symptomatic but completely ambulatory  General Appearance:  alert, cooperative, no apparent distress and appears stated age   Neurologic/Psychiatric: A&O x 3, gait steady, appropriate affect, strength 5/5 in all muscle groups   HEENT:  Normocephalic, without obvious abnormality, mucous membranes moist   Neck: Supple, symmetrical, trachea midline, no adenopathy;  No thyromegaly, masses, or tenderness   Lungs:   Clear to auscultation bilaterally; respirations regular, even, and unlabored bilaterally   Heart:  Regular rate and rhythm, no murmurs appreciated   Abdomen:   Soft, non-tender, non-distended and no organomegaly   Lymph nodes: No cervical, supraclavicular, inguinal or axillary adenopathy noted   Extremities: Normal, atraumatic; no " clubbing, cyanosis, or edema    Skin: No rashes, ulcers, or suspicious lesions noted     No visits with results within 2 Week(s) from this visit.   Latest known visit with results is:   Hospital Outpatient Visit on 03/20/2020   Component Date Value Ref Range Status   • Glucose 03/20/2020 138* 65 - 99 mg/dL Final   • BUN 03/20/2020 7* 8 - 23 mg/dL Final   • Creatinine 03/20/2020 0.92  0.76 - 1.27 mg/dL Final   • Sodium 03/20/2020 141  136 - 145 mmol/L Final   • Potassium 03/20/2020 3.9  3.5 - 5.2 mmol/L Final   • Chloride 03/20/2020 105  98 - 107 mmol/L Final   • CO2 03/20/2020 24.0  22.0 - 29.0 mmol/L Final   • Calcium 03/20/2020 8.9  8.6 - 10.5 mg/dL Final   • Total Protein 03/20/2020 7.2  6.0 - 8.5 g/dL Final   • Albumin 03/20/2020 4.10  3.50 - 5.20 g/dL Final   • ALT (SGPT) 03/20/2020 18  1 - 41 U/L Final   • AST (SGOT) 03/20/2020 39  1 - 40 U/L Final   • Alkaline Phosphatase 03/20/2020 91  39 - 117 U/L Final   • Total Bilirubin 03/20/2020 0.3  0.2 - 1.2 mg/dL Final   • eGFR Non African Amer 03/20/2020 83  >60 mL/min/1.73 Final   • Globulin 03/20/2020 3.1  gm/dL Final   • A/G Ratio 03/20/2020 1.3  g/dL Final   • BUN/Creatinine Ratio 03/20/2020 7.6  7.0 - 25.0 Final   • Anion Gap 03/20/2020 12.0  5.0 - 15.0 mmol/L Final   • WBC 03/20/2020 4.50  3.40 - 10.80 10*3/mm3 Final   • RBC 03/20/2020 3.65* 4.14 - 5.80 10*6/mm3 Final   • Hemoglobin 03/20/2020 11.9* 13.0 - 17.7 g/dL Final   • Hematocrit 03/20/2020 36.1* 37.5 - 51.0 % Final   • RDW 03/20/2020 19.9* 12.3 - 15.4 % Final   • MCV 03/20/2020 98.9* 79.0 - 97.0 fL Final   • MCH 03/20/2020 32.7  26.6 - 33.0 pg Final   • MCHC 03/20/2020 33.0  31.5 - 35.7 g/dL Final   • MPV 03/20/2020 7.1  6.0 - 12.0 fL Final   • Platelets 03/20/2020 273  140 - 450 10*3/mm3 Final   • Neutrophil % 03/20/2020 89.2* 42.7 - 76.0 % Final   • Lymphocyte % 03/20/2020 6.4* 19.6 - 45.3 % Final   • Monocyte % 03/20/2020 4.4* 5.0 - 12.0 % Final   • Neutrophils, Absolute 03/20/2020 4.00  1.70 -  7.00 10*3/mm3 Final   • Lymphocytes, Absolute 03/20/2020 0.30* 0.70 - 3.10 10*3/mm3 Final   • Monocytes, Absolute 03/20/2020 0.20  0.10 - 0.90 10*3/mm3 Final   • Creatinine 03/20/2020 0.9  mg/dL Final   • Creatinine 03/20/2020 0.90  0.60 - 1.30 mg/dL Final    Serial Number: [id number]        No results found.(  ASSESSMENT: The patient is a very pleasant 61 y.o. male with metastatic adenocarcinoma of the lung    PROBLEM LIST:  1. Non-small cell lung cancer originally diagnosed in 2009 status post right upper lobectomy done by Dr. Real in 2009.  2. Left upper lobe lung nodules found on surveillance CT in 2012, status post left upper lobe wedge resection with benign pathology.  3.  Left lung cancer, adenocarcinoma:  A.  Presented as a new left lower lobe lung nodule found on chest x-ray done 6/2016.   B. CT scan of the chest done 6/16/2016 showing interval enlargement of left lung nodules. PET CT showing hypermetabolic activity in the left lower lobe nodule.   C. CT guided biopsy of left lower lobe nodule positive for adenocarcinoma of the lung. [id number], stage Ia.  C. Status post CyberKnife therapy completed 9/29/2016 per Dr. Lesia Pinzon  D.  Repeated PET scan done on October 2019 revealed increase in size as well as hypermetabolic activity left upper lobe lung nodule with a new hypermetabolic active right supraclavicular lymph node and essentially other stable findings.  E.  Status post bronchoscopy with biopsy done by Dr. You November 6, 2019 revealed adenocarcinoma from level 4 left and suspicious cytology from left upper lobe.  F.  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  No enough tissue to do PDL 1 testing.  G.  Started carbo Alimta and Keytruda December 04, 2019, status post 4 cycle  H. Started maintenance Alimta/Keytruda on 2/28/2020 status post 2 cycles.   4.  Hemoptysis:  A.  Status post venous embolization done by Dr. Camp November 7, 2019  B.   Completed by radiation to left upper lobe mass November 26, 2019  4.  Black lung  5. Sarcoidosis  6. Hypothyroidism  7.  Upper airway infection  8.  Treatment induced nausea    PLAN:  1. We will proceed today using Alimta/Keytruda cycle #3 as planned.   2. We will see the patient back in 3 weeks for maintenance regimen using Alimta/Keytruda cycle #4.    3. We will continue to monitor the patient's labs throughout treatment including blood counts, kidney function, thyroid function, and liver functions.   4.  We reviewed again the potential side effects of immunotherapy including but not limited to immune mediated reactions with thyroiditis, pneumonitis, hepatitis, colitis, rash, and electrolytes abnormalities, fatigue, multiorgan failure, and possibly death.  5. We reviewed again the potential side effects of this regimen including fatigue, vomiting and nausea, hair loss, nephropathy, neuropathy, hearing loss, myelosuppression, and risk of infusion reaction.  6. We will plan to repeat CT scans in 3 months which will be due 5/7/2020.   7.  The patient can continue Mucinex 600 mg take by mouth twice per day as needed for congestion and cough.   8. He will continue levothyroxine 75 mcg for hypothyroidism. We will adjust his dose if needed for fluctuations in TSH.   9.  We will continue Zofran as needed for chemotherapy-induced nausea.  10.  We will continue folic acid, vitamin B12, and Decadron per Alimta protocol.  11. The patient will continue Miralax Senakot and Lactulose for constipation.     Padmaja Denny MD  4/10/2020

## 2020-04-24 ENCOUNTER — APPOINTMENT (OUTPATIENT)
Dept: PET IMAGING | Facility: HOSPITAL | Age: 65
End: 2020-04-24

## 2020-04-30 ENCOUNTER — OFFICE VISIT (OUTPATIENT)
Dept: ONCOLOGY | Facility: CLINIC | Age: 65
End: 2020-04-30

## 2020-04-30 ENCOUNTER — HOSPITAL ENCOUNTER (OUTPATIENT)
Dept: ONCOLOGY | Facility: HOSPITAL | Age: 65
Setting detail: INFUSION SERIES
Discharge: HOME | End: 2020-04-30

## 2020-04-30 VITALS
WEIGHT: 184.3 LBS | BODY MASS INDEX: 29.62 KG/M2 | TEMPERATURE: 97.7 F | HEART RATE: 72 BPM | SYSTOLIC BLOOD PRESSURE: 140 MMHG | HEIGHT: 66 IN | DIASTOLIC BLOOD PRESSURE: 72 MMHG | RESPIRATION RATE: 16 BRPM | OXYGEN SATURATION: 96 %

## 2020-04-30 DIAGNOSIS — C34.32 MALIGNANT NEOPLASM OF LOWER LOBE OF LEFT LUNG (HCC): Primary | ICD-10-CM

## 2020-04-30 DIAGNOSIS — C34.12 MALIGNANT NEOPLASM OF UPPER LOBE OF LEFT LUNG (HCC): Primary | ICD-10-CM

## 2020-04-30 LAB
ALBUMIN SERPL-MCNC: 4.4 G/DL (ref 3.5–5.2)
ALBUMIN/GLOB SERPL: 1.6 G/DL
ALP SERPL-CCNC: 91 U/L (ref 39–117)
ALT SERPL W P-5'-P-CCNC: 16 U/L (ref 1–41)
ANION GAP SERPL CALCULATED.3IONS-SCNC: 14 MMOL/L (ref 5–15)
AST SERPL-CCNC: 27 U/L (ref 1–40)
BILIRUB SERPL-MCNC: 0.2 MG/DL (ref 0.2–1.2)
BUN BLD-MCNC: 12 MG/DL (ref 8–23)
BUN/CREAT SERPL: 13.5 (ref 7–25)
CALCIUM SPEC-SCNC: 9.4 MG/DL (ref 8.6–10.5)
CHLORIDE SERPL-SCNC: 103 MMOL/L (ref 98–107)
CO2 SERPL-SCNC: 23 MMOL/L (ref 22–29)
CREAT BLD-MCNC: 0.89 MG/DL (ref 0.76–1.27)
ERYTHROCYTE [DISTWIDTH] IN BLOOD BY AUTOMATED COUNT: 15.3 % (ref 12.3–15.4)
GFR SERPL CREATININE-BSD FRML MDRD: 86 ML/MIN/1.73
GLOBULIN UR ELPH-MCNC: 2.8 GM/DL
GLUCOSE BLD-MCNC: 129 MG/DL (ref 65–99)
HCT VFR BLD AUTO: 37 % (ref 37.5–51)
HGB BLD-MCNC: 11.7 G/DL (ref 13–17.7)
LYMPHOCYTES # BLD AUTO: 0.4 10*3/MM3 (ref 0.7–3.1)
LYMPHOCYTES NFR BLD AUTO: 8.3 % (ref 19.6–45.3)
MCH RBC QN AUTO: 31.6 PG (ref 26.6–33)
MCHC RBC AUTO-ENTMCNC: 31.6 G/DL (ref 31.5–35.7)
MCV RBC AUTO: 99.9 FL (ref 79–97)
MONOCYTES # BLD AUTO: 0.1 10*3/MM3 (ref 0.1–0.9)
MONOCYTES NFR BLD AUTO: 2.7 % (ref 5–12)
NEUTROPHILS # BLD AUTO: 4.6 10*3/MM3 (ref 1.7–7)
NEUTROPHILS NFR BLD AUTO: 89 % (ref 42.7–76)
PLATELET # BLD AUTO: 340 10*3/MM3 (ref 140–450)
PMV BLD AUTO: 6.9 FL (ref 6–12)
POTASSIUM BLD-SCNC: 4.1 MMOL/L (ref 3.5–5.2)
PROT SERPL-MCNC: 7.2 G/DL (ref 6–8.5)
RBC # BLD AUTO: 3.71 10*6/MM3 (ref 4.14–5.8)
SODIUM BLD-SCNC: 140 MMOL/L (ref 136–145)
WBC NRBC COR # BLD: 5.2 10*3/MM3 (ref 3.4–10.8)

## 2020-04-30 PROCEDURE — 25010000002 PEMETREXED PER 10 MG: Performed by: INTERNAL MEDICINE

## 2020-04-30 PROCEDURE — 85025 COMPLETE CBC W/AUTO DIFF WBC: CPT | Performed by: INTERNAL MEDICINE

## 2020-04-30 PROCEDURE — 96411 CHEMO IV PUSH ADDL DRUG: CPT

## 2020-04-30 PROCEDURE — 25010000002 PEMETREXED 100 MG RECONSTITUTED SOLUTION 100 MG VIAL: Performed by: INTERNAL MEDICINE

## 2020-04-30 PROCEDURE — 25010000002 PEMBROLIZUMAB 100 MG/4ML SOLUTION 4 ML VIAL: Performed by: INTERNAL MEDICINE

## 2020-04-30 PROCEDURE — 96413 CHEMO IV INFUSION 1 HR: CPT

## 2020-04-30 PROCEDURE — 82565 ASSAY OF CREATININE: CPT

## 2020-04-30 PROCEDURE — 80053 COMPREHEN METABOLIC PANEL: CPT | Performed by: INTERNAL MEDICINE

## 2020-04-30 PROCEDURE — 99214 OFFICE O/P EST MOD 30 MIN: CPT | Performed by: NURSE PRACTITIONER

## 2020-04-30 RX ORDER — SODIUM CHLORIDE 9 MG/ML
250 INJECTION, SOLUTION INTRAVENOUS ONCE
Status: COMPLETED | OUTPATIENT
Start: 2020-04-30 | End: 2020-04-30

## 2020-04-30 RX ADMIN — SODIUM CHLORIDE 250 ML: 9 INJECTION, SOLUTION INTRAVENOUS at 10:18

## 2020-04-30 RX ADMIN — SODIUM CHLORIDE 200 MG: 900 INJECTION, SOLUTION INTRAVENOUS at 10:20

## 2020-04-30 RX ADMIN — SODIUM CHLORIDE 900 MG: 9 INJECTION, SOLUTION INTRAVENOUS at 10:59

## 2020-04-30 NOTE — PROGRESS NOTES
DATE OF VISIT: 4/30/2020    REASON FOR VISIT: Recurrent adenocarcinoma of the lung       HISTORY OF PRESENT ILLNESS: The patient is a very pleasant 64 y.o. male  with past medical history significant for lung cancer diagnosed 6/22/2016 . 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 PET scan done 6/22/2016 showed findings consistent with recurrent neoplasm in the left upper lobe, with metastatic adenopathy to the left hilum and AP window as well as a metastatic nodule in the left lower lobe. CT-guided biopsy of the left lower lobe nodule was performed that showed adenocarcinoma of the lung. The patient underwent CyberKnife therapy by Dr Pinzon completed 9/29/2016.  Repeat scan showed gradual increase in size of left upper lobe lung infiltrates.  Patient had whole-body PET scan that revealed increased metabolic activity in the left lung infiltrate as well as a new right supraclavicular lymph nodes.  Patient had bronchoscopy with biopsy done by Dr. You on November 6, 2019 that revealed adenocarcinoma and level 4 left mediastinal lymph node.  He received palliative course of radiation to the left upper lobe secondary to persistent hemoptysis followed by palliative treatment with carboplatin Alimta and Keytruda started December 4, 2019.  Completed 4 cycles on February 7, 2020.  The patient was started on Alimta with Keytruda on February 28, 2020.  The patient is here today for scheduled follow up visit with treatment cycle #4.     SUBJECTIVE: The patient is here today by himself. He has been doing fairly well. He has continued to be active and stay busy. He has been compliant with social distancing. He denies diarrhea or skin rash. He has some shortness of breath with activity, however this has not worsened. He denies headaches, fever, chills, or visual changes.     PAST MEDICAL HISTORY/SOCIAL HISTORY/FAMILY HISTORY: Unchanged from  my prior documentation done on 08/02/2016.     Review of Systems   Constitutional: Positive for fatigue. Negative for activity change, appetite change, chills, fever and unexpected weight change.   HENT: Negative for hearing loss, mouth sores, nosebleeds, sore throat and trouble swallowing.    Eyes: Negative for visual disturbance.        Tearing   Respiratory: Positive for cough and shortness of breath. Negative for chest tightness and wheezing.         With activity   Cardiovascular: Negative for chest pain, palpitations and leg swelling.   Gastrointestinal: Negative for abdominal distention, abdominal pain, blood in stool, constipation, diarrhea, nausea, rectal pain and vomiting.   Endocrine: Negative for cold intolerance and heat intolerance.   Genitourinary: Negative for difficulty urinating, dysuria, frequency and urgency.   Musculoskeletal: Negative for arthralgias, back pain, gait problem, joint swelling and myalgias.   Skin: Negative for rash.   Neurological: Negative for dizziness, tremors, syncope, weakness, light-headedness, numbness and headaches.   Hematological: Negative for adenopathy. Does not bruise/bleed easily.   Psychiatric/Behavioral: Negative for confusion, sleep disturbance and suicidal ideas. The patient is not nervous/anxious.          Current Outpatient Medications:   •  albuterol (PROVENTIL HFA;VENTOLIN HFA) 108 (90 BASE) MCG/ACT inhaler, Inhale 2 puffs every 4 (four) hours as needed for wheezing or shortness of air., Disp: , Rfl:   •  dexamethasone (DECADRON) 4 MG tablet, Take 1 tablet twice daily starting the day before chemo, day of chemo, and day after chemo.  Take with food., Disp: 30 tablet, Rfl: 3  •  dexamethasone (DECADRON) 4 MG tablet, Take 1 tablet twice daily starting the day before chemo, day of chemo, and day after chemo.  Take with food., Disp: 30 tablet, Rfl: 5  •  folic acid (FOLVITE) 1 MG tablet, Take 1 tablet by mouth Daily. Start at least 7 days prior to chemotherapy  "until at least 3 weeks after all chemotherapy., Disp: 30 tablet, Rfl: 5  •  guaiFENesin (MUCINEX) 600 MG 12 hr tablet, Take 1 tablet by mouth 2 (Two) Times a Day., Disp: 60 tablet, Rfl: 5  •  HYDROcodone-acetaminophen (NORCO) 7.5-325 MG per tablet, Take 1 tablet by mouth every 6 (six) hours as needed for moderate pain (4-6)., Disp: , Rfl:   •  lactulose (CHRONULAC) 10 GM/15ML solution, Take 30 mL by mouth 3 (Three) Times a Day. PRN constipation, Disp: 240 mL, Rfl: 2  •  levocetirizine (XYZAL) 5 MG tablet, Take 5 mg by mouth Every Evening., Disp: , Rfl:   •  levothyroxine (SYNTHROID, LEVOTHROID) 75 MCG tablet, Take 75 mcg by mouth Daily., Disp: , Rfl:   •  omeprazole (priLOSEC) 40 MG capsule, Take 40 mg by mouth Daily., Disp: , Rfl:   •  ondansetron (ZOFRAN) 8 MG tablet, Take 1 tablet by mouth 3 (Three) Times a Day As Needed for Nausea or Vomiting., Disp: 30 tablet, Rfl: 5  •  SYMBICORT 160-4.5 MCG/ACT inhaler, INL 2 PUFFS PO BID, Disp: , Rfl: 3  •  tamsulosin (FLOMAX) 0.4 MG capsule 24 hr capsule, Take 1 capsule by mouth Daily., Disp: 30 capsule, Rfl: 6    PHYSICAL EXAMINATION:   /72   Pulse 72   Temp 97.7 °F (36.5 °C) (Temporal)   Resp 16   Ht 167.6 cm (65.98\")   Wt 83.6 kg (184 lb 4.8 oz)   SpO2 96%   BMI 29.76 kg/m²    There were no vitals filed for this visit.   ECOG Performance Status: 1 - Symptomatic but completely ambulatory  General Appearance:  alert, cooperative, no apparent distress and appears stated age   Neurologic/Psychiatric: A&O x 3, gait steady, appropriate affect, strength 5/5 in all muscle groups   HEENT:  Normocephalic, without obvious abnormality, mucous membranes moist   Neck: Supple, symmetrical, trachea midline, no adenopathy;  No thyromegaly, masses, or tenderness   Lungs:   Clear to auscultation bilaterally; respirations regular, even, and unlabored bilaterally   Heart:  Regular rate and rhythm, no murmurs appreciated   Abdomen:   Soft, non-tender, non-distended and no " organomegaly   Lymph nodes: No cervical, supraclavicular, inguinal or axillary adenopathy noted   Extremities: Normal, atraumatic; no clubbing, cyanosis, or edema    Skin: No rashes, ulcers, or suspicious lesions noted     No visits with results within 2 Week(s) from this visit.   Latest known visit with results is:   Hospital Outpatient Visit on 04/10/2020   Component Date Value Ref Range Status   • Glucose 04/10/2020 152* 65 - 99 mg/dL Final   • BUN 04/10/2020 10  8 - 23 mg/dL Final   • Creatinine 04/10/2020 0.84  0.76 - 1.27 mg/dL Final   • Sodium 04/10/2020 140  136 - 145 mmol/L Final   • Potassium 04/10/2020 3.3* 3.5 - 5.2 mmol/L Final   • Chloride 04/10/2020 106  98 - 107 mmol/L Final   • CO2 04/10/2020 23.0  22.0 - 29.0 mmol/L Final   • Calcium 04/10/2020 9.2  8.6 - 10.5 mg/dL Final   • Total Protein 04/10/2020 7.6  6.0 - 8.5 g/dL Final   • Albumin 04/10/2020 4.30  3.50 - 5.20 g/dL Final   • ALT (SGPT) 04/10/2020 21  1 - 41 U/L Final   • AST (SGOT) 04/10/2020 35  1 - 40 U/L Final   • Alkaline Phosphatase 04/10/2020 86  39 - 117 U/L Final   • Total Bilirubin 04/10/2020 0.3  0.2 - 1.2 mg/dL Final   • eGFR Non African Amer 04/10/2020 92  >60 mL/min/1.73 Final   • Globulin 04/10/2020 3.3  gm/dL Final   • A/G Ratio 04/10/2020 1.3  g/dL Final   • BUN/Creatinine Ratio 04/10/2020 11.9  7.0 - 25.0 Final   • Anion Gap 04/10/2020 11.0  5.0 - 15.0 mmol/L Final   • TSH 04/10/2020 0.944  0.270 - 4.200 uIU/mL Final   • Free T4 04/10/2020 1.25  0.93 - 1.70 ng/dL Final   • WBC 04/10/2020 4.60  3.40 - 10.80 10*3/mm3 Final   • RBC 04/10/2020 3.52* 4.14 - 5.80 10*6/mm3 Final   • Hemoglobin 04/10/2020 11.9* 13.0 - 17.7 g/dL Final   • Hematocrit 04/10/2020 35.3* 37.5 - 51.0 % Final   • RDW 04/10/2020 16.5* 12.3 - 15.4 % Final   • MCV 04/10/2020 100.2* 79.0 - 97.0 fL Final   • MCH 04/10/2020 33.9* 26.6 - 33.0 pg Final   • MCHC 04/10/2020 33.8  31.5 - 35.7 g/dL Final   • MPV 04/10/2020 6.7  6.0 - 12.0 fL Final   • Platelets  04/10/2020 295  140 - 450 10*3/mm3 Final   • Neutrophil % 04/10/2020 84.7* 42.7 - 76.0 % Final   • Lymphocyte % 04/10/2020 8.6* 19.6 - 45.3 % Final   • Monocyte % 04/10/2020 6.7  5.0 - 12.0 % Final   • Neutrophils, Absolute 04/10/2020 3.90  1.70 - 7.00 10*3/mm3 Final   • Lymphocytes, Absolute 04/10/2020 0.40* 0.70 - 3.10 10*3/mm3 Final   • Monocytes, Absolute 04/10/2020 0.30  0.10 - 0.90 10*3/mm3 Final   • Creatinine 04/10/2020 0.70  0.60 - 1.30 mg/dL Final    Serial Number: [id number]:  [id number]        No results found.(  ASSESSMENT: The patient is a very pleasant 64 y.o. with metastatic adenocarcinoma of the lung    PROBLEM LIST:  1. Non-small cell lung cancer originally diagnosed in 2009 status post right upper lobectomy done by Dr. Real in 2009.  2. Left upper lobe lung nodules found on surveillance CT in 2012, status post left upper lobe wedge resection with benign pathology.  3.  Left lung cancer, adenocarcinoma:  A.  Presented as a new left lower lobe lung nodule found on chest x-ray done 6/2016.   B. CT scan of the chest done 6/16/2016 showing interval enlargement of left lung nodules. PET CT showing hypermetabolic activity in the left lower lobe nodule.   C. CT guided biopsy of left lower lobe nodule positive for adenocarcinoma of the lung. [id number], stage Ia.  C. Status post CyberKnife therapy completed 9/29/2016 per Dr. Lesia CHÁVEZ.  Repeated PET scan done on October 2019 revealed increase in size as well as hypermetabolic activity left upper lobe lung nodule with a new hypermetabolic active right supraclavicular lymph node and essentially other stable findings.  E.  Status post bronchoscopy with biopsy done by Dr. You November 6, 2019 revealed adenocarcinoma from level 4 left and suspicious cytology from left upper lobe.  F.  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  No enough tissue to do PDL 1 testing.  G.  Started carbo Alimta and Keytruda December 04,  2019, status post 4 cycle  H. Started maintenance Alimta/Keytruda on 2/28/2020 status post 3 cycles.   4.  Hemoptysis:  A.  Status post venous embolization done by Dr. Camp November 7, 2019  B.  Completed by radiation to left upper lobe mass November 26, 2019  4.  Black lung  5. Sarcoidosis  6. Hypothyroidism  7.  Upper airway infection  8.  Treatment induced nausea    PLAN:  1. We will proceed today using Alimta/Keytruda cycle #4 as planned.   2. We will see the patient back in 3 weeks for maintenance regimen using Alimta/Keytruda cycle #5.    3. We will continue to monitor the patient's labs throughout treatment including blood counts, kidney function, thyroid function, and liver functions.   4.  We reviewed again the potential side effects of immunotherapy including but not limited to immune mediated reactions with thyroiditis, pneumonitis, hepatitis, colitis, rash, and electrolytes abnormalities, fatigue, multiorgan failure, and possibly death.  5. We reviewed again the potential side effects of this regimen including fatigue, vomiting and nausea, hair loss, nephropathy, neuropathy, hearing loss, myelosuppression, and risk of infusion reaction.  6. We will plan to repeat PET scan in 3 months which will be due 5/7/2020, and ordered for prior to return.   7.  The patient can continue Mucinex 600 mg take by mouth twice per day as needed for congestion and cough.   8. He will continue levothyroxine 75 mcg for hypothyroidism. We will adjust his dose if needed for fluctuations in TSH.   9.  We will continue Zofran as needed for chemotherapy-induced nausea.  10.  We will continue folic acid, vitamin B12, and Decadron per Alimta protocol.  11. The patient will continue Miralax Sen[place]ot and Lactulose for constipation.   12. I recommended he try artificial tears for irritation related to Alimta. We can consider starting steroid eye drops if his symptoms worsen.     Snehal Stallworth, APRN  4/30/2020

## 2020-05-11 LAB — CREAT BLDA-MCNC: 0.8 MG/DL (ref 0.6–1.3)

## 2020-05-18 ENCOUNTER — HOSPITAL ENCOUNTER (OUTPATIENT)
Dept: PET IMAGING | Facility: HOSPITAL | Age: 65
Discharge: HOME | End: 2020-05-18

## 2020-05-18 ENCOUNTER — HOSPITAL ENCOUNTER (OUTPATIENT)
Dept: PET IMAGING | Facility: HOSPITAL | Age: 65
Discharge: HOME | End: 2020-05-18
Admitting: FAMILY MEDICINE

## 2020-05-18 DIAGNOSIS — C34.32 MALIGNANT NEOPLASM OF LOWER LOBE OF LEFT LUNG (HCC): ICD-10-CM

## 2020-05-18 LAB — GLUCOSE BLDC GLUCOMTR-MCNC: 105 MG/DL (ref 70–130)

## 2020-05-18 PROCEDURE — 82962 GLUCOSE BLOOD TEST: CPT

## 2020-05-18 PROCEDURE — 78815 PET IMAGE W/CT SKULL-THIGH: CPT

## 2020-05-18 PROCEDURE — 0 FLUDEOXYGLUCOSE F18 SOLUTION: Performed by: NURSE PRACTITIONER

## 2020-05-18 PROCEDURE — A9552 F18 FDG: HCPCS | Performed by: NURSE PRACTITIONER

## 2020-05-18 RX ADMIN — FLUDEOXYGLUCOSE F18 1 DOSE: 300 INJECTION INTRAVENOUS at 10:12

## 2020-05-21 ENCOUNTER — TELEPHONE (OUTPATIENT)
Dept: ONCOLOGY | Facility: CLINIC | Age: 65
End: 2020-05-21

## 2020-05-22 ENCOUNTER — OFFICE VISIT (OUTPATIENT)
Dept: ONCOLOGY | Facility: CLINIC | Age: 65
End: 2020-05-22

## 2020-05-22 ENCOUNTER — HOSPITAL ENCOUNTER (OUTPATIENT)
Dept: ONCOLOGY | Facility: HOSPITAL | Age: 65
Setting detail: INFUSION SERIES
Discharge: HOME | End: 2020-05-22

## 2020-05-22 VITALS
HEART RATE: 74 BPM | WEIGHT: 179 LBS | TEMPERATURE: 97.8 F | HEIGHT: 66 IN | DIASTOLIC BLOOD PRESSURE: 63 MMHG | BODY MASS INDEX: 28.77 KG/M2 | SYSTOLIC BLOOD PRESSURE: 140 MMHG | OXYGEN SATURATION: 90 % | RESPIRATION RATE: 16 BRPM

## 2020-05-22 DIAGNOSIS — C34.12 MALIGNANT NEOPLASM OF UPPER LOBE OF LEFT LUNG (HCC): Primary | ICD-10-CM

## 2020-05-22 DIAGNOSIS — C34.12 MALIGNANT NEOPLASM OF UPPER LOBE OF LEFT LUNG (HCC): ICD-10-CM

## 2020-05-22 DIAGNOSIS — C34.32 MALIGNANT NEOPLASM OF LOWER LOBE OF LEFT LUNG (HCC): Primary | ICD-10-CM

## 2020-05-22 LAB
ALBUMIN SERPL-MCNC: 3.5 G/DL (ref 3.5–5.2)
ALBUMIN/GLOB SERPL: 1 G/DL
ALP SERPL-CCNC: 80 U/L (ref 39–117)
ALT SERPL W P-5'-P-CCNC: 22 U/L (ref 1–41)
ANION GAP SERPL CALCULATED.3IONS-SCNC: 14 MMOL/L (ref 5–15)
AST SERPL-CCNC: 34 U/L (ref 1–40)
BILIRUB SERPL-MCNC: 0.2 MG/DL (ref 0.2–1.2)
BUN BLD-MCNC: 10 MG/DL (ref 8–23)
BUN/CREAT SERPL: 10.8 (ref 7–25)
CALCIUM SPEC-SCNC: 9.1 MG/DL (ref 8.6–10.5)
CHLORIDE SERPL-SCNC: 103 MMOL/L (ref 98–107)
CO2 SERPL-SCNC: 23 MMOL/L (ref 22–29)
CREAT BLD-MCNC: 0.93 MG/DL (ref 0.76–1.27)
CREAT BLDA-MCNC: 0.9 MG/DL (ref 0.6–1.3)
CREAT SERPL-MCNC: 0.9 MG/DL
ERYTHROCYTE [DISTWIDTH] IN BLOOD BY AUTOMATED COUNT: 15.7 % (ref 12.3–15.4)
GFR SERPL CREATININE-BSD FRML MDRD: 82 ML/MIN/1.73
GLOBULIN UR ELPH-MCNC: 3.5 GM/DL
GLUCOSE BLD-MCNC: 194 MG/DL (ref 65–99)
HCT VFR BLD AUTO: 34.8 % (ref 37.5–51)
HGB BLD-MCNC: 10.9 G/DL (ref 13–17.7)
LYMPHOCYTES # BLD AUTO: 0.5 10*3/MM3 (ref 0.7–3.1)
LYMPHOCYTES NFR BLD AUTO: 7.7 % (ref 19.6–45.3)
MCH RBC QN AUTO: 30 PG (ref 26.6–33)
MCHC RBC AUTO-ENTMCNC: 31.4 G/DL (ref 31.5–35.7)
MCV RBC AUTO: 95.8 FL (ref 79–97)
MONOCYTES # BLD AUTO: 0.6 10*3/MM3 (ref 0.1–0.9)
MONOCYTES NFR BLD AUTO: 8.1 % (ref 5–12)
NEUTROPHILS # BLD AUTO: 5.7 10*3/MM3 (ref 1.7–7)
NEUTROPHILS NFR BLD AUTO: 84.2 % (ref 42.7–76)
PLATELET # BLD AUTO: 346 10*3/MM3 (ref 140–450)
PMV BLD AUTO: 7.4 FL (ref 6–12)
POTASSIUM BLD-SCNC: 3.5 MMOL/L (ref 3.5–5.2)
PROT SERPL-MCNC: 7 G/DL (ref 6–8.5)
RBC # BLD AUTO: 3.63 10*6/MM3 (ref 4.14–5.8)
SODIUM BLD-SCNC: 140 MMOL/L (ref 136–145)
T4 FREE SERPL-MCNC: 1.35 NG/DL (ref 0.93–1.7)
TSH SERPL DL<=0.05 MIU/L-ACNC: 1.41 UIU/ML (ref 0.27–4.2)
WBC NRBC COR # BLD: 6.8 10*3/MM3 (ref 3.4–10.8)

## 2020-05-22 PROCEDURE — 80053 COMPREHEN METABOLIC PANEL: CPT | Performed by: INTERNAL MEDICINE

## 2020-05-22 PROCEDURE — 85025 COMPLETE CBC W/AUTO DIFF WBC: CPT | Performed by: INTERNAL MEDICINE

## 2020-05-22 PROCEDURE — 99215 OFFICE O/P EST HI 40 MIN: CPT | Performed by: INTERNAL MEDICINE

## 2020-05-22 PROCEDURE — 84439 ASSAY OF FREE THYROXINE: CPT | Performed by: INTERNAL MEDICINE

## 2020-05-22 PROCEDURE — 25010000002 PEMETREXED PER 10 MG: Performed by: INTERNAL MEDICINE

## 2020-05-22 PROCEDURE — 96413 CHEMO IV INFUSION 1 HR: CPT

## 2020-05-22 PROCEDURE — 96409 CHEMO IV PUSH SNGL DRUG: CPT

## 2020-05-22 PROCEDURE — 84443 ASSAY THYROID STIM HORMONE: CPT | Performed by: INTERNAL MEDICINE

## 2020-05-22 PROCEDURE — 96411 CHEMO IV PUSH ADDL DRUG: CPT

## 2020-05-22 PROCEDURE — 82565 ASSAY OF CREATININE: CPT

## 2020-05-22 PROCEDURE — 25010000002 PEMBROLIZUMAB 100 MG/4ML SOLUTION 4 ML VIAL: Performed by: INTERNAL MEDICINE

## 2020-05-22 PROCEDURE — 25010000002 PEMETREXED 100 MG RECONSTITUTED SOLUTION 100 MG VIAL: Performed by: INTERNAL MEDICINE

## 2020-05-22 RX ORDER — SODIUM CHLORIDE 9 MG/ML
250 INJECTION, SOLUTION INTRAVENOUS ONCE
Status: DISCONTINUED | OUTPATIENT
Start: 2020-05-22 | End: 2020-05-23 | Stop reason: HOSPADM

## 2020-05-22 RX ORDER — SODIUM CHLORIDE 9 MG/ML
250 INJECTION, SOLUTION INTRAVENOUS ONCE
Status: CANCELLED | OUTPATIENT
Start: 2020-06-12

## 2020-05-22 RX ORDER — CYANOCOBALAMIN 1000 UG/ML
1000 INJECTION, SOLUTION INTRAMUSCULAR; SUBCUTANEOUS ONCE
Status: DISCONTINUED | OUTPATIENT
Start: 2020-05-22 | End: 2020-05-23 | Stop reason: HOSPADM

## 2020-05-22 RX ADMIN — SODIUM CHLORIDE 900 MG: 9 INJECTION, SOLUTION INTRAVENOUS at 11:27

## 2020-05-22 RX ADMIN — SODIUM CHLORIDE 200 MG: 9 INJECTION, SOLUTION INTRAVENOUS at 10:46

## 2020-05-22 NOTE — PROGRESS NOTES
DATE OF VISIT: 5/22/2020    REASON FOR VISIT: Recurrent adenocarcinoma of the lung       HISTORY OF PRESENT ILLNESS: The patient is a very pleasant 64 y.o. male  with past medical history significant for lung cancer diagnosed 6/22/2016 . 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 PET scan done 6/22/2016 showed findings consistent with recurrent neoplasm in the left upper lobe, with metastatic adenopathy to the left hilum and AP window as well as a metastatic nodule in the left lower lobe. CT-guided biopsy of the left lower lobe nodule was performed that showed adenocarcinoma of the lung. The patient underwent CyberKnife therapy by Dr Pinzon completed 9/29/2016.  Repeat scan showed gradual increase in size of left upper lobe lung infiltrates.  Patient had whole-body PET scan that revealed increased metabolic activity in the left lung infiltrate as well as a new right supraclavicular lymph nodes.  Patient had bronchoscopy with biopsy done by Dr. You on November 6, 2019 that revealed adenocarcinoma and level 4 left mediastinal lymph node.  He received palliative course of radiation to the left upper lobe secondary to persistent hemoptysis followed by palliative treatment with carboplatin Alimta and Keytruda started December 4, 2019.  Completed 4 cycles on February 7, 2020.  The patient was started on Alimta with Keytruda on February 28, 2020.  The patient is here today for scheduled follow up visit with treatment cycle #4.     SUBJECTIVE: The patient is here today by himself.  He is complaining of fatigue.  He denies any fever or chills.  His breathing is stable.  He is anxious about the scan results.    PAST MEDICAL HISTORY/SOCIAL HISTORY/FAMILY HISTORY: Unchanged from my prior documentation done on 08/02/2016.     Review of Systems   Constitutional: Positive for fatigue. Negative for activity change, appetite change, chills,  fever and unexpected weight change.   HENT: Negative for hearing loss, mouth sores, nosebleeds, sore throat and trouble swallowing.    Eyes: Negative for visual disturbance.        Tearing   Respiratory: Positive for cough and shortness of breath. Negative for chest tightness and wheezing.         With activity   Cardiovascular: Negative for chest pain, palpitations and leg swelling.   Gastrointestinal: Negative for abdominal distention, abdominal pain, blood in stool, constipation, diarrhea, nausea, rectal pain and vomiting.   Endocrine: Negative for cold intolerance and heat intolerance.   Genitourinary: Negative for difficulty urinating, dysuria, frequency and urgency.   Musculoskeletal: Negative for arthralgias, back pain, gait problem, joint swelling and myalgias.   Skin: Negative for rash.   Neurological: Negative for dizziness, tremors, syncope, weakness, light-headedness, numbness and headaches.   Hematological: Negative for adenopathy. Does not bruise/bleed easily.   Psychiatric/Behavioral: Negative for confusion, sleep disturbance and suicidal ideas. The patient is not nervous/anxious.          Current Outpatient Medications:   •  albuterol (PROVENTIL HFA;VENTOLIN HFA) 108 (90 BASE) MCG/ACT inhaler, Inhale 2 puffs every 4 (four) hours as needed for wheezing or shortness of air., Disp: , Rfl:   •  dexamethasone (DECADRON) 4 MG tablet, Take 1 tablet twice daily starting the day before chemo, day of chemo, and day after chemo.  Take with food., Disp: 30 tablet, Rfl: 3  •  dexamethasone (DECADRON) 4 MG tablet, Take 1 tablet twice daily starting the day before chemo, day of chemo, and day after chemo.  Take with food., Disp: 30 tablet, Rfl: 5  •  folic acid (FOLVITE) 1 MG tablet, Take 1 tablet by mouth Daily. Start at least 7 days prior to chemotherapy until at least 3 weeks after all chemotherapy., Disp: 30 tablet, Rfl: 5  •  guaiFENesin (MUCINEX) 600 MG 12 hr tablet, Take 1 tablet by mouth 2 (Two) Times a  "Day., Disp: 60 tablet, Rfl: 5  •  HYDROcodone-acetaminophen (NORCO) 7.5-325 MG per tablet, Take 1 tablet by mouth every 6 (six) hours as needed for moderate pain (4-6)., Disp: , Rfl:   •  lactulose (CHRONULAC) 10 GM/15ML solution, Take 30 mL by mouth 3 (Three) Times a Day. PRN constipation, Disp: 240 mL, Rfl: 2  •  levocetirizine (XYZAL) 5 MG tablet, Take 5 mg by mouth Every Evening., Disp: , Rfl:   •  levothyroxine (SYNTHROID, LEVOTHROID) 75 MCG tablet, Take 75 mcg by mouth Daily., Disp: , Rfl:   •  omeprazole (priLOSEC) 40 MG capsule, Take 40 mg by mouth Daily., Disp: , Rfl:   •  ondansetron (ZOFRAN) 8 MG tablet, Take 1 tablet by mouth 3 (Three) Times a Day As Needed for Nausea or Vomiting., Disp: 30 tablet, Rfl: 5  •  SYMBICORT 160-4.5 MCG/ACT inhaler, INL 2 PUFFS PO BID, Disp: , Rfl: 3  •  tamsulosin (FLOMAX) 0.4 MG capsule 24 hr capsule, Take 1 capsule by mouth Daily., Disp: 30 capsule, Rfl: 6    PHYSICAL EXAMINATION:   /63   Pulse 74   Temp 97.8 °F (36.6 °C) (Temporal)   Resp 16   Ht 167.6 cm (65.98\")   Wt 81.2 kg (179 lb)   SpO2 90%   BMI 28.91 kg/m²    Pain Score    05/22/20 0909   PainSc: 0-No pain      ECOG Performance Status: 1 - Symptomatic but completely ambulatory  General Appearance:  alert, cooperative, no apparent distress and appears stated age   Neurologic/Psychiatric: A&O x 3, gait steady, appropriate affect, strength 5/5 in all muscle groups   HEENT:  Normocephalic, without obvious abnormality, mucous membranes moist   Neck: Supple, symmetrical, trachea midline, no adenopathy;  No thyromegaly, masses, or tenderness   Lungs:   Clear to auscultation bilaterally; respirations regular, even, and unlabored bilaterally   Heart:  Regular rate and rhythm, no murmurs appreciated   Abdomen:   Soft, non-tender, non-distended and no organomegaly   Lymph nodes: No cervical, supraclavicular, inguinal or axillary adenopathy noted   Extremities: Normal, atraumatic; no clubbing, cyanosis, or edema  "   Skin: No rashes, ulcers, or suspicious lesions noted     Hospital Outpatient Visit on 05/18/2020   Component Date Value Ref Range Status   • Glucose 05/18/2020 105  70 - 130 mg/dL Final        Nm Pet Skull Base To Mid Thigh    Result Date: 5/19/2020  Narrative: EXAMINATION: NM PET SKULL BASE TO MID THIGH-  INDICATION: Restaging lung cancer; C34.32-Malignant neoplasm of lower lobe, left bronchus or lung; followup lung cancer.  TECHNIQUE: With fasting blood glucose level 105 mg/dL, a total of 12.63 mCi of FDG was administered via the right antecubital vein. Following appropriate delay, PET CT imaging was obtained from the skull vertex to the mid thighs bilaterally and fused multiplanar images were reconstructed. The CT scan is an unenhanced study and used for reference to the PET scan only and should not be considered a diagnostic CT scan. PET CT imaging was reviewed in the axial, coronal, and sagittal planes as well as within the cine mode.  COMPARISON: Subsequent exam for treatment with prior study available for comparison dated 02/07/2020.  FINDINGS: There is normal variant activity identified within the oropharynx and muscles of phonation.  Normal variant activity is identified in the region of the vocal cords. No hypermetabolic activity identified within the neck to suggest evidence of metastatic disease. In the supraclavicular region, there is development of lymph nodes on the right with maximum SUV of 3.3 representing metastatic disease. There is consolidation that is increasing in size in the left upper lobe today with maximum SUV of 5.8 and previous maximum SUV was 3.2. Findings suggesting progression of disease. There is interval increase seen in the activity within the perihilar regions bilaterally. Findings may be related to postradiation treatment. There is some low level activity correlating to the areas of pleural thickening in the periphery of the lung fields bilaterally. Findings again may suggest  postradiation change. Low level activity is seen in the hilar regions bilaterally maximum SUV on the right is 3.3 and on the left is 3.5. Findings suggesting possible radiation changes. There is increased size seen of the lymph nodes in the AP window with increasing activity today with maximum SUV of 3.4.  Within the abdomen and pelvis, there is normal variant activity seen within the GI and  tract. No hypermetabolic activity identified to suggest evidence of metastatic disease. Upper thighs are unremarkable in appearance.      Impression: New lymph nodes identified in the right supraclavicular region and increasing size of the AP window lymph nodes concerning for progression of disease. There is interval increase seen in the masslike density in the left upper lobe which is also concerning for progression of disease, however, postradiation change is also a possibility. Low level activity seen throughout the periphery of the lung fields where there is scarring and pleural thickening as well as of the perihilar regions bilaterally. Findings may suggest postradiation change. Close interval followup is recommended.   D:  05/18/2020 E:  05/19/2020  This report was finalized on 5/19/2020 11:51 AM by Dr. Henrietta Menjivar MD.    (  ASSESSMENT: The patient is a very pleasant 64 y.o. with metastatic adenocarcinoma of the lung    PROBLEM LIST:  1. Non-small cell lung cancer originally diagnosed in 2009 status post right upper lobectomy done by Dr. Real in 2009.  2. Left upper lobe lung nodules found on surveillance CT in 2012, status post left upper lobe wedge resection with benign pathology.  3.  Left lung cancer, adenocarcinoma:  A.  Presented as a new left lower lobe lung nodule found on chest x-ray done 6/2016.   B. CT scan of the chest done 6/16/2016 showing interval enlargement of left lung nodules. PET CT showing hypermetabolic activity in the left lower lobe nodule.   C. CT guided biopsy of left lower lobe nodule  positive for adenocarcinoma of the lung. [id number], stage Ia.  C. Status post CyberKnife therapy completed 9/29/2016 per Dr. Lesia CHÁVEZ.  Repeated PET scan done on October 2019 revealed increase in size as well as hypermetabolic activity left upper lobe lung nodule with a new hypermetabolic active right supraclavicular lymph node and essentially other stable findings.  E.  Status post bronchoscopy with biopsy done by Dr. You November 6, 2019 revealed adenocarcinoma from level 4 left and suspicious cytology from left upper lobe.  F.  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  No enough tissue to do PDL 1 testing.  G.  Started carbo Alimta and Keytruda December 04, 2019, status post 4 cycle  H. Started maintenance Alimta/Keytruda on 2/28/2020 status post 3 cycles.   4.  Hemoptysis:  A.  Status post venous embolization done by Dr. Camp November 7, 2019  B.  Completed by radiation to left upper lobe mass November 26, 2019  4.  Black lung  5. Sarcoidosis  6. Hypothyroidism  7.  Conjunctivitis  8.  Treatment induced nausea    PLAN:  1. We will proceed today using Alimta/Keytruda cycle #5 as planned.   2. We will see the patient back in 3 weeks for maintenance regimen using Alimta/Keytruda cycle #6.    3. We will continue to monitor the patient's labs throughout treatment including blood counts, kidney function, thyroid function, and liver functions.   4.  We reviewed again the potential side effects of immunotherapy including but not limited to immune mediated reactions with thyroiditis, pneumonitis, hepatitis, colitis, rash, and electrolytes abnormalities, fatigue, multiorgan failure, and possibly death.  5. We reviewed again the potential side effects of this regimen including fatigue, vomiting and nausea, hair loss, nephropathy, neuropathy, hearing loss, myelosuppression, and risk of infusion reaction.  6.  I did go over his PET scan results in details.  We reviewed the films  together we have compared the current PET scan to previous study done February 2020.  The radiologist read as progressive disease which might be the case however I am suspecting this could be inflammatory postradiation changes.  The patient still fairly asymptomatic from respiratory standpoint.  The area in the left upper lobe looks little bit more diffuse which could be explained by previous radiation.  The activity and the right upper mediastinal nodes has minimal SUV of 3.3 which we will watch for now.  I will do 3 months follow-up study which will be due August 2020.   7.  The patient can continue Mucinex 600 mg take by mouth twice per day as needed for congestion and cough.   8. He will continue levothyroxine 75 mcg for hypothyroidism. We will adjust his dose if needed for fluctuations in TSH.   9.  We will continue Zofran as needed for chemotherapy-induced nausea.  10.  We will continue folic acid, vitamin B12, and Decadron per Alimta protocol.  11. The patient will continue Miralax Sen[place]ot and Lactulose for constipation.   12.  Continue iron moisturizers for conjunctivitis.  Padmaja Denny MD  5/22/2020

## 2020-06-11 ENCOUNTER — TELEPHONE (OUTPATIENT)
Dept: ONCOLOGY | Facility: CLINIC | Age: 65
End: 2020-06-11

## 2020-06-12 ENCOUNTER — HOSPITAL ENCOUNTER (OUTPATIENT)
Dept: ONCOLOGY | Facility: HOSPITAL | Age: 65
Setting detail: INFUSION SERIES
Discharge: HOME | End: 2020-06-12

## 2020-06-12 ENCOUNTER — OFFICE VISIT (OUTPATIENT)
Dept: ONCOLOGY | Facility: CLINIC | Age: 65
End: 2020-06-12

## 2020-06-12 VITALS
HEART RATE: 88 BPM | WEIGHT: 175 LBS | SYSTOLIC BLOOD PRESSURE: 141 MMHG | TEMPERATURE: 97.7 F | OXYGEN SATURATION: 95 % | RESPIRATION RATE: 16 BRPM | DIASTOLIC BLOOD PRESSURE: 70 MMHG | BODY MASS INDEX: 28.12 KG/M2 | HEIGHT: 66 IN

## 2020-06-12 DIAGNOSIS — C34.32 MALIGNANT NEOPLASM OF LOWER LOBE OF LEFT LUNG (HCC): Primary | ICD-10-CM

## 2020-06-12 DIAGNOSIS — C34.12 MALIGNANT NEOPLASM OF UPPER LOBE OF LEFT LUNG (HCC): ICD-10-CM

## 2020-06-12 DIAGNOSIS — C34.12 MALIGNANT NEOPLASM OF UPPER LOBE OF LEFT LUNG (HCC): Primary | ICD-10-CM

## 2020-06-12 LAB
ALBUMIN SERPL-MCNC: 3.9 G/DL (ref 3.5–5.2)
ALBUMIN/GLOB SERPL: 1.3 G/DL
ALP SERPL-CCNC: 94 U/L (ref 39–117)
ALT SERPL W P-5'-P-CCNC: 15 U/L (ref 1–41)
ANION GAP SERPL CALCULATED.3IONS-SCNC: 12 MMOL/L (ref 5–15)
AST SERPL-CCNC: 27 U/L (ref 1–40)
BILIRUB SERPL-MCNC: 0.2 MG/DL (ref 0.2–1.2)
BUN BLD-MCNC: 9 MG/DL (ref 8–23)
BUN/CREAT SERPL: 9.1 (ref 7–25)
CALCIUM SPEC-SCNC: 8.7 MG/DL (ref 8.6–10.5)
CHLORIDE SERPL-SCNC: 105 MMOL/L (ref 98–107)
CO2 SERPL-SCNC: 24 MMOL/L (ref 22–29)
CREAT BLD-MCNC: 0.99 MG/DL (ref 0.76–1.27)
CREAT BLDA-MCNC: 0.9 MG/DL
CREAT BLDA-MCNC: 0.9 MG/DL (ref 0.6–1.3)
ERYTHROCYTE [DISTWIDTH] IN BLOOD BY AUTOMATED COUNT: 17.1 % (ref 12.3–15.4)
GFR SERPL CREATININE-BSD FRML MDRD: 76 ML/MIN/1.73
GLOBULIN UR ELPH-MCNC: 3.1 GM/DL
GLUCOSE BLD-MCNC: 185 MG/DL (ref 65–99)
HCT VFR BLD AUTO: 35.9 % (ref 37.5–51)
HGB BLD-MCNC: 10.8 G/DL (ref 13–17.7)
LYMPHOCYTES # BLD AUTO: 0.4 10*3/MM3 (ref 0.7–3.1)
LYMPHOCYTES NFR BLD AUTO: 9.2 % (ref 19.6–45.3)
MCH RBC QN AUTO: 27.6 PG (ref 26.6–33)
MCHC RBC AUTO-ENTMCNC: 30.1 G/DL (ref 31.5–35.7)
MCV RBC AUTO: 91.8 FL (ref 79–97)
MONOCYTES # BLD AUTO: 0.2 10*3/MM3 (ref 0.1–0.9)
MONOCYTES NFR BLD AUTO: 4.9 % (ref 5–12)
NEUTROPHILS # BLD AUTO: 3.6 10*3/MM3 (ref 1.7–7)
NEUTROPHILS NFR BLD AUTO: 85.9 % (ref 42.7–76)
PLATELET # BLD AUTO: 401 10*3/MM3 (ref 140–450)
PMV BLD AUTO: 7 FL (ref 6–12)
POTASSIUM BLD-SCNC: 3.9 MMOL/L (ref 3.5–5.2)
PROT SERPL-MCNC: 7 G/DL (ref 6–8.5)
RBC # BLD AUTO: 3.91 10*6/MM3 (ref 4.14–5.8)
SODIUM BLD-SCNC: 141 MMOL/L (ref 136–145)
WBC NRBC COR # BLD: 4.2 10*3/MM3 (ref 3.4–10.8)

## 2020-06-12 PROCEDURE — 25010000002 PEMETREXED PER 10 MG: Performed by: INTERNAL MEDICINE

## 2020-06-12 PROCEDURE — 96411 CHEMO IV PUSH ADDL DRUG: CPT

## 2020-06-12 PROCEDURE — 25010000002 PEMBROLIZUMAB 100 MG/4ML SOLUTION 4 ML VIAL: Performed by: INTERNAL MEDICINE

## 2020-06-12 PROCEDURE — 80053 COMPREHEN METABOLIC PANEL: CPT | Performed by: INTERNAL MEDICINE

## 2020-06-12 PROCEDURE — 96413 CHEMO IV INFUSION 1 HR: CPT

## 2020-06-12 PROCEDURE — 85025 COMPLETE CBC W/AUTO DIFF WBC: CPT | Performed by: INTERNAL MEDICINE

## 2020-06-12 PROCEDURE — 82565 ASSAY OF CREATININE: CPT

## 2020-06-12 PROCEDURE — 25010000002 PEMETREXED 100 MG RECONSTITUTED SOLUTION 100 MG VIAL: Performed by: INTERNAL MEDICINE

## 2020-06-12 PROCEDURE — 99215 OFFICE O/P EST HI 40 MIN: CPT | Performed by: INTERNAL MEDICINE

## 2020-06-12 RX ORDER — SODIUM CHLORIDE 9 MG/ML
250 INJECTION, SOLUTION INTRAVENOUS ONCE
Status: COMPLETED | OUTPATIENT
Start: 2020-06-12 | End: 2020-06-12

## 2020-06-12 RX ORDER — PROMETHAZINE HYDROCHLORIDE 25 MG/1
TABLET ORAL
COMMUNITY
Start: 2020-05-23

## 2020-06-12 RX ORDER — SODIUM CHLORIDE 9 MG/ML
250 INJECTION, SOLUTION INTRAVENOUS ONCE
Status: CANCELLED | OUTPATIENT
Start: 2020-07-03

## 2020-06-12 RX ADMIN — SODIUM CHLORIDE 200 MG: 900 INJECTION, SOLUTION INTRAVENOUS at 10:56

## 2020-06-12 RX ADMIN — SODIUM CHLORIDE 900 MG: 9 INJECTION, SOLUTION INTRAVENOUS at 11:52

## 2020-06-12 RX ADMIN — SODIUM CHLORIDE 250 ML: 9 INJECTION, SOLUTION INTRAVENOUS at 10:56

## 2020-06-12 NOTE — PROGRESS NOTES
DATE OF VISIT: 6/12/2020    REASON FOR VISIT: Recurrent adenocarcinoma of the lung       HISTORY OF PRESENT ILLNESS: The patient is a very pleasant 64 y.o. male  with past medical history significant for lung cancer diagnosed 6/22/2016 . 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 PET scan done 6/22/2016 showed findings consistent with recurrent neoplasm in the left upper lobe, with metastatic adenopathy to the left hilum and AP window as well as a metastatic nodule in the left lower lobe. CT-guided biopsy of the left lower lobe nodule was performed that showed adenocarcinoma of the lung. The patient underwent CyberKnife therapy by Dr Pinzon completed 9/29/2016.  Repeat scan showed gradual increase in size of left upper lobe lung infiltrates.  Patient had whole-body PET scan that revealed increased metabolic activity in the left lung infiltrate as well as a new right supraclavicular lymph nodes.  Patient had bronchoscopy with biopsy done by Dr. You on November 6, 2019 that revealed adenocarcinoma and level 4 left mediastinal lymph node.  He received palliative course of radiation to the left upper lobe secondary to persistent hemoptysis followed by palliative treatment with carboplatin Alimta and Keytruda started December 4, 2019.  Completed 4 cycles on February 7, 2020.  The patient was started on Alimta with Keytruda on February 28, 2020.  The patient is here today for scheduled follow up visit with treatment cycle #6.     SUBJECTIVE: The patient is here today by himself.  He is still anxious about his previous PET scan result. His breathing is stable.     PAST MEDICAL HISTORY/SOCIAL HISTORY/FAMILY HISTORY: Unchanged from my prior documentation done on 08/02/2016.     Review of Systems   Constitutional: Positive for fatigue. Negative for activity change, appetite change, chills, fever and unexpected weight change.   HENT:  Negative for hearing loss, mouth sores, nosebleeds, sore throat and trouble swallowing.    Eyes: Negative for visual disturbance.        Tearing   Respiratory: Positive for cough and shortness of breath. Negative for chest tightness and wheezing.         With activity   Cardiovascular: Negative for chest pain, palpitations and leg swelling.   Gastrointestinal: Negative for abdominal distention, abdominal pain, blood in stool, constipation, diarrhea, nausea, rectal pain and vomiting.   Endocrine: Negative for cold intolerance and heat intolerance.   Genitourinary: Negative for difficulty urinating, dysuria, frequency and urgency.   Musculoskeletal: Negative for arthralgias, back pain, gait problem, joint swelling and myalgias.   Skin: Negative for rash.   Neurological: Negative for dizziness, tremors, syncope, weakness, light-headedness, numbness and headaches.   Hematological: Negative for adenopathy. Does not bruise/bleed easily.   Psychiatric/Behavioral: Negative for confusion, sleep disturbance and suicidal ideas. The patient is not nervous/anxious.          Current Outpatient Medications:   •  albuterol (PROVENTIL HFA;VENTOLIN HFA) 108 (90 BASE) MCG/ACT inhaler, Inhale 2 puffs every 4 (four) hours as needed for wheezing or shortness of air., Disp: , Rfl:   •  dexamethasone (DECADRON) 4 MG tablet, Take 1 tablet twice daily starting the day before chemo, day of chemo, and day after chemo.  Take with food., Disp: 30 tablet, Rfl: 3  •  dexamethasone (DECADRON) 4 MG tablet, Take 1 tablet twice daily starting the day before chemo, day of chemo, and day after chemo.  Take with food., Disp: 30 tablet, Rfl: 5  •  folic acid (FOLVITE) 1 MG tablet, Take 1 tablet by mouth Daily. Start at least 7 days prior to chemotherapy until at least 3 weeks after all chemotherapy., Disp: 30 tablet, Rfl: 5  •  guaiFENesin (MUCINEX) 600 MG 12 hr tablet, Take 1 tablet by mouth 2 (Two) Times a Day., Disp: 60 tablet, Rfl: 5  •   "HYDROcodone-acetaminophen (NORCO) 7.5-325 MG per tablet, Take 1 tablet by mouth every 6 (six) hours as needed for moderate pain (4-6)., Disp: , Rfl:   •  INCRUSE ELLIPTA 62.5 MCG/INH aerosol powder , Inhale 1 puff Daily., Disp: , Rfl:   •  lactulose (CHRONULAC) 10 GM/15ML solution, Take 30 mL by mouth 3 (Three) Times a Day. PRN constipation, Disp: 240 mL, Rfl: 2  •  levocetirizine (XYZAL) 5 MG tablet, Take 5 mg by mouth Every Evening., Disp: , Rfl:   •  levothyroxine (SYNTHROID, LEVOTHROID) 75 MCG tablet, Take 75 mcg by mouth Daily., Disp: , Rfl:   •  omeprazole (priLOSEC) 40 MG capsule, Take 40 mg by mouth Daily., Disp: , Rfl:   •  ondansetron (ZOFRAN) 8 MG tablet, Take 1 tablet by mouth 3 (Three) Times a Day As Needed for Nausea or Vomiting., Disp: 30 tablet, Rfl: 5  •  promethazine (PHENERGAN) 25 MG tablet, , Disp: , Rfl:   •  SYMBICORT 160-4.5 MCG/ACT inhaler, INL 2 PUFFS PO BID, Disp: , Rfl: 3  •  tamsulosin (FLOMAX) 0.4 MG capsule 24 hr capsule, Take 1 capsule by mouth Daily., Disp: 30 capsule, Rfl: 6    PHYSICAL EXAMINATION:   /70   Pulse 88   Temp 97.7 °F (36.5 °C) (Temporal)   Resp 16   Ht 167.6 cm (65.98\")   Wt 79.4 kg (175 lb)   SpO2 95%   BMI 28.26 kg/m²    Pain Score    06/12/20 0915   PainSc: 0-No pain      ECOG Performance Status: 1 - Symptomatic but completely ambulatory  General Appearance:  alert, cooperative, no apparent distress and appears stated age   Neurologic/Psychiatric: A&O x 3, gait steady, appropriate affect, strength 5/5 in all muscle groups   HEENT:  Normocephalic, without obvious abnormality, mucous membranes moist   Neck: Supple, symmetrical, trachea midline, no adenopathy;  No thyromegaly, masses, or tenderness   Lungs:   Clear to auscultation bilaterally; respirations regular, even, and unlabored bilaterally   Heart:  Regular rate and rhythm, no murmurs appreciated   Abdomen:   Soft, non-tender, non-distended and no organomegaly   Lymph nodes: No cervical, " supraclavicular, inguinal or axillary adenopathy noted   Extremities: Normal, atraumatic; no clubbing, cyanosis, or edema    Skin: No rashes, ulcers, or suspicious lesions noted     No visits with results within 2 Week(s) from this visit.   Latest known visit with results is:   Hospital Outpatient Visit on 05/22/2020   Component Date Value Ref Range Status   • Glucose 05/22/2020 194* 65 - 99 mg/dL Final   • BUN 05/22/2020 10  8 - 23 mg/dL Final   • Creatinine 05/22/2020 0.93  0.76 - 1.27 mg/dL Final   • Sodium 05/22/2020 140  136 - 145 mmol/L Final   • Potassium 05/22/2020 3.5  3.5 - 5.2 mmol/L Final   • Chloride 05/22/2020 103  98 - 107 mmol/L Final   • CO2 05/22/2020 23.0  22.0 - 29.0 mmol/L Final   • Calcium 05/22/2020 9.1  8.6 - 10.5 mg/dL Final   • Total Protein 05/22/2020 7.0  6.0 - 8.5 g/dL Final   • Albumin 05/22/2020 3.50  3.50 - 5.20 g/dL Final   • ALT (SGPT) 05/22/2020 22  1 - 41 U/L Final   • AST (SGOT) 05/22/2020 34  1 - 40 U/L Final   • Alkaline Phosphatase 05/22/2020 80  39 - 117 U/L Final   • Total Bilirubin 05/22/2020 0.2  0.2 - 1.2 mg/dL Final   • eGFR Non African Amer 05/22/2020 82  >60 mL/min/1.73 Final   • Globulin 05/22/2020 3.5  gm/dL Final   • A/G Ratio 05/22/2020 1.0  g/dL Final   • BUN/Creatinine Ratio 05/22/2020 10.8  7.0 - 25.0 Final   • Anion Gap 05/22/2020 14.0  5.0 - 15.0 mmol/L Final   • TSH 05/22/2020 1.410  0.270 - 4.200 uIU/mL Final    TSH results may be falsely decreased if patient taking Biotin.   • Free T4 05/22/2020 1.35  0.93 - 1.70 ng/dL Final   • WBC 05/22/2020 6.80  3.40 - 10.80 10*3/mm3 Final   • RBC 05/22/2020 3.63* 4.14 - 5.80 10*6/mm3 Final   • Hemoglobin 05/22/2020 10.9* 13.0 - 17.7 g/dL Final   • Hematocrit 05/22/2020 34.8* 37.5 - 51.0 % Final   • RDW 05/22/2020 15.7* 12.3 - 15.4 % Final   • MCV 05/22/2020 95.8  79.0 - 97.0 fL Final   • MCH 05/22/2020 30.0  26.6 - 33.0 pg Final   • MCHC 05/22/2020 31.4* 31.5 - 35.7 g/dL Final   • MPV 05/22/2020 7.4  6.0 - 12.0 fL  Final   • Platelets 05/22/2020 346  140 - 450 10*3/mm3 Final   • Neutrophil % 05/22/2020 84.2* 42.7 - 76.0 % Final   • Lymphocyte % 05/22/2020 7.7* 19.6 - 45.3 % Final   • Monocyte % 05/22/2020 8.1  5.0 - 12.0 % Final   • Neutrophils, Absolute 05/22/2020 5.70  1.70 - 7.00 10*3/mm3 Final   • Lymphocytes, Absolute 05/22/2020 0.50* 0.70 - 3.10 10*3/mm3 Final   • Monocytes, Absolute 05/22/2020 0.60  0.10 - 0.90 10*3/mm3 Final   • Creatinine 05/22/2020 0.9  mg/dL Final   • Creatinine 05/22/2020 0.90  0.60 - 1.30 mg/dL Final    Serial Number: [id number]:  [id number]        Nm Pet Skull Base To Mid Thigh    Result Date: 5/19/2020  Narrative: EXAMINATION: NM PET SKULL BASE TO MID THIGH-  INDICATION: Restaging lung cancer; C34.32-Malignant neoplasm of lower lobe, left bronchus or lung; followup lung cancer.  TECHNIQUE: With fasting blood glucose level 105 mg/dL, a total of 12.63 mCi of FDG was administered via the right antecubital vein. Following appropriate delay, PET CT imaging was obtained from the skull vertex to the mid thighs bilaterally and fused multiplanar images were reconstructed. The CT scan is an unenhanced study and used for reference to the PET scan only and should not be considered a diagnostic CT scan. PET CT imaging was reviewed in the axial, coronal, and sagittal planes as well as within the cine mode.  COMPARISON: Subsequent exam for treatment with prior study available for comparison dated 02/07/2020.  FINDINGS: There is normal variant activity identified within the oropharynx and muscles of phonation.  Normal variant activity is identified in the region of the vocal cords. No hypermetabolic activity identified within the neck to suggest evidence of metastatic disease. In the supraclavicular region, there is development of lymph nodes on the right with maximum SUV of 3.3 representing metastatic disease. There is consolidation that is increasing in size in the left upper lobe today with maximum SUV of  5.8 and previous maximum SUV was 3.2. Findings suggesting progression of disease. There is interval increase seen in the activity within the perihilar regions bilaterally. Findings may be related to postradiation treatment. There is some low level activity correlating to the areas of pleural thickening in the periphery of the lung fields bilaterally. Findings again may suggest postradiation change. Low level activity is seen in the hilar regions bilaterally maximum SUV on the right is 3.3 and on the left is 3.5. Findings suggesting possible radiation changes. There is increased size seen of the lymph nodes in the AP window with increasing activity today with maximum SUV of 3.4.  Within the abdomen and pelvis, there is normal variant activity seen within the GI and  tract. No hypermetabolic activity identified to suggest evidence of metastatic disease. Upper thighs are unremarkable in appearance.      Impression: New lymph nodes identified in the right supraclavicular region and increasing size of the AP window lymph nodes concerning for progression of disease. There is interval increase seen in the masslike density in the left upper lobe which is also concerning for progression of disease, however, postradiation change is also a possibility. Low level activity seen throughout the periphery of the lung fields where there is scarring and pleural thickening as well as of the perihilar regions bilaterally. Findings may suggest postradiation change. Close interval followup is recommended.   D:  05/18/2020 E:  05/19/2020  This report was finalized on 5/19/2020 11:51 AM by Dr. Henrietta Menjivar MD.    (  ASSESSMENT: The patient is a very pleasant 64 y.o. with metastatic adenocarcinoma of the lung    PROBLEM LIST:  1. Non-small cell lung cancer originally diagnosed in 2009 status post right upper lobectomy done by Dr. Real in 2009.  2. Left upper lobe lung nodules found on surveillance CT in 2012, status post left  upper lobe wedge resection with benign pathology.  3.  Left lung cancer, adenocarcinoma:  A.  Presented as a new left lower lobe lung nodule found on chest x-ray done 6/2016.   B. CT scan of the chest done 6/16/2016 showing interval enlargement of left lung nodules. PET CT showing hypermetabolic activity in the left lower lobe nodule.   C. CT guided biopsy of left lower lobe nodule positive for adenocarcinoma of the lung. [id number], stage Ia.  C. Status post CyberKnife therapy completed 9/29/2016 per Dr. Lesia Pinzon  D.  Repeated PET scan done on October 2019 revealed increase in size as well as hypermetabolic activity left upper lobe lung nodule with a new hypermetabolic active right supraclavicular lymph node and essentially other stable findings.  E.  Status post bronchoscopy with biopsy done by Dr. You November 6, 2019 revealed adenocarcinoma from level 4 left and suspicious cytology from left upper lobe.  F.  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  No enough tissue to do PDL 1 testing.  G.  Started carbo Alimta and Keytruda December 04, 2019, status post 4 cycle  H. Started maintenance Alimta/Keytruda on 2/28/2020 status post 5 cycles.   4.  Hemoptysis:  A.  Status post venous embolization done by Dr. Camp November 7, 2019  B.  Completed by radiation to left upper lobe mass November 26, 2019  4.  Black lung  5. Sarcoidosis  6. Hypothyroidism  7.  Conjunctivitis  8.  Treatment induced nausea    PLAN:  1. We will proceed today using Alimta/Keytruda cycle #6 as planned.   2. We will see the patient back in 3 weeks for maintenance regimen using Alimta/Keytruda cycle #7.    3. We will continue to monitor the patient's labs throughout treatment including blood counts, kidney function, thyroid function, and liver functions.   4.  We reviewed again the potential side effects of immunotherapy including but not limited to immune mediated reactions with thyroiditis, pneumonitis,  hepatitis, colitis, rash, and electrolytes abnormalities, fatigue, multiorgan failure, and possibly death.  5. We reviewed again the potential side effects of this regimen including fatigue, vomiting and nausea, hair loss, nephropathy, neuropathy, hearing loss, myelosuppression, and risk of infusion reaction.  6.  I will do 3 months follow-up study which will be due August 2020.   7.  The patient can continue Mucinex 600 mg take by mouth twice per day as needed for congestion and cough.   8. He will continue levothyroxine 75 mcg for hypothyroidism. We will adjust his dose if needed for fluctuations in TSH.   9.  We will continue Zofran as needed for chemotherapy-induced nausea.  10.  We will continue folic acid, vitamin B12, and [facility]ron [facility] [place] [facility].  11. The patient will continue Miralax Senakot and Lactulose for constipation.   12.  Continue saline moisturizers for conjunctivitis.  Padmaja Denny MD  6/12/2020

## 2020-07-06 ENCOUNTER — OFFICE VISIT (OUTPATIENT)
Dept: ONCOLOGY | Facility: CLINIC | Age: 65
End: 2020-07-06

## 2020-07-06 ENCOUNTER — HOSPITAL ENCOUNTER (OUTPATIENT)
Dept: ONCOLOGY | Facility: HOSPITAL | Age: 65
Setting detail: INFUSION SERIES
Discharge: HOME | End: 2020-07-06

## 2020-07-06 VITALS
OXYGEN SATURATION: 91 % | DIASTOLIC BLOOD PRESSURE: 78 MMHG | TEMPERATURE: 98 F | WEIGHT: 178 LBS | HEIGHT: 66 IN | SYSTOLIC BLOOD PRESSURE: 186 MMHG | RESPIRATION RATE: 16 BRPM | BODY MASS INDEX: 28.61 KG/M2 | HEART RATE: 87 BPM

## 2020-07-06 DIAGNOSIS — C34.32 MALIGNANT NEOPLASM OF LOWER LOBE OF LEFT LUNG (HCC): Primary | ICD-10-CM

## 2020-07-06 DIAGNOSIS — C34.12 MALIGNANT NEOPLASM OF UPPER LOBE OF LEFT LUNG (HCC): Primary | ICD-10-CM

## 2020-07-06 LAB
ALBUMIN SERPL-MCNC: 4 G/DL (ref 3.5–5.2)
ALBUMIN/GLOB SERPL: 1.3 G/DL
ALP SERPL-CCNC: 88 U/L (ref 39–117)
ALT SERPL W P-5'-P-CCNC: 15 U/L (ref 1–41)
ANION GAP SERPL CALCULATED.3IONS-SCNC: 13 MMOL/L (ref 5–15)
AST SERPL-CCNC: 25 U/L (ref 1–40)
BILIRUB SERPL-MCNC: 0.2 MG/DL (ref 0.2–1.2)
BUN SERPL-MCNC: 10 MG/DL (ref 8–23)
BUN/CREAT SERPL: 10.5 (ref 7–25)
CALCIUM SPEC-SCNC: 9.1 MG/DL (ref 8.6–10.5)
CHLORIDE SERPL-SCNC: 105 MMOL/L (ref 98–107)
CO2 SERPL-SCNC: 23 MMOL/L (ref 22–29)
CREAT SERPL-MCNC: 0.95 MG/DL (ref 0.76–1.27)
ERYTHROCYTE [DISTWIDTH] IN BLOOD BY AUTOMATED COUNT: 19.4 % (ref 12.3–15.4)
GFR SERPL CREATININE-BSD FRML MDRD: 80 ML/MIN/1.73
GLOBULIN UR ELPH-MCNC: 3.1 GM/DL
GLUCOSE SERPL-MCNC: 166 MG/DL (ref 65–99)
HCT VFR BLD AUTO: 38.2 % (ref 37.5–51)
HGB BLD-MCNC: 11.6 G/DL (ref 13–17.7)
LYMPHOCYTES # BLD AUTO: 0.5 10*3/MM3 (ref 0.7–3.1)
LYMPHOCYTES NFR BLD AUTO: 10.2 % (ref 19.6–45.3)
MCH RBC QN AUTO: 28.5 PG (ref 26.6–33)
MCHC RBC AUTO-ENTMCNC: 30.4 G/DL (ref 31.5–35.7)
MCV RBC AUTO: 93.5 FL (ref 79–97)
MONOCYTES # BLD AUTO: 0.3 10*3/MM3 (ref 0.1–0.9)
MONOCYTES NFR BLD AUTO: 5.3 % (ref 5–12)
NEUTROPHILS NFR BLD AUTO: 4.5 10*3/MM3 (ref 1.7–7)
NEUTROPHILS NFR BLD AUTO: 84.5 % (ref 42.7–76)
PLATELET # BLD AUTO: 287 10*3/MM3 (ref 140–450)
PMV BLD AUTO: 6.9 FL (ref 6–12)
POTASSIUM SERPL-SCNC: 3.4 MMOL/L (ref 3.5–5.2)
PROT SERPL-MCNC: 7.1 G/DL (ref 6–8.5)
RBC # BLD AUTO: 4.09 10*6/MM3 (ref 4.14–5.8)
SODIUM SERPL-SCNC: 141 MMOL/L (ref 136–145)
T4 FREE SERPL-MCNC: 1.18 NG/DL (ref 0.93–1.7)
TSH SERPL DL<=0.05 MIU/L-ACNC: 1.71 UIU/ML (ref 0.27–4.2)
WBC # BLD AUTO: 5.3 10*3/MM3 (ref 3.4–10.8)

## 2020-07-06 PROCEDURE — 25010000002 PEMETREXED 100 MG RECONSTITUTED SOLUTION 100 MG VIAL: Performed by: INTERNAL MEDICINE

## 2020-07-06 PROCEDURE — 25010000002 PEMBROLIZUMAB 100 MG/4ML SOLUTION 4 ML VIAL: Performed by: INTERNAL MEDICINE

## 2020-07-06 PROCEDURE — 99214 OFFICE O/P EST MOD 30 MIN: CPT | Performed by: NURSE PRACTITIONER

## 2020-07-06 PROCEDURE — 84439 ASSAY OF FREE THYROXINE: CPT | Performed by: INTERNAL MEDICINE

## 2020-07-06 PROCEDURE — 96413 CHEMO IV INFUSION 1 HR: CPT

## 2020-07-06 PROCEDURE — 80053 COMPREHEN METABOLIC PANEL: CPT | Performed by: INTERNAL MEDICINE

## 2020-07-06 PROCEDURE — 96409 CHEMO IV PUSH SNGL DRUG: CPT

## 2020-07-06 PROCEDURE — 96411 CHEMO IV PUSH ADDL DRUG: CPT

## 2020-07-06 PROCEDURE — 25010000002 PEMETREXED PER 10 MG: Performed by: INTERNAL MEDICINE

## 2020-07-06 PROCEDURE — 85025 COMPLETE CBC W/AUTO DIFF WBC: CPT | Performed by: INTERNAL MEDICINE

## 2020-07-06 PROCEDURE — 84443 ASSAY THYROID STIM HORMONE: CPT | Performed by: INTERNAL MEDICINE

## 2020-07-06 RX ADMIN — SODIUM CHLORIDE 200 MG: 9 INJECTION, SOLUTION INTRAVENOUS at 13:16

## 2020-07-06 RX ADMIN — SODIUM CHLORIDE 900 MG: 9 INJECTION, SOLUTION INTRAVENOUS at 14:01

## 2020-07-06 NOTE — PROGRESS NOTES
DATE OF VISIT: 7/6/2020    REASON FOR VISIT: Recurrent adenocarcinoma of the lung       HISTORY OF PRESENT ILLNESS: The patient is a very pleasant 64 y.o. male  with past medical history significant for lung cancer diagnosed 6/22/2016 . 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 PET scan done 6/22/2016 showed findings consistent with recurrent neoplasm in the left upper lobe, with metastatic adenopathy to the left hilum and AP window as well as a metastatic nodule in the left lower lobe. CT-guided biopsy of the left lower lobe nodule was performed that showed adenocarcinoma of the lung. The patient underwent CyberKnife therapy by Dr Pinzon completed 9/29/2016.  Repeat scan showed gradual increase in size of left upper lobe lung infiltrates.  Patient had whole-body PET scan that revealed increased metabolic activity in the left lung infiltrate as well as a new right supraclavicular lymph nodes.  Patient had bronchoscopy with biopsy done by Dr. You on November 6, 2019 that revealed adenocarcinoma and level 4 left mediastinal lymph node.  He received palliative course of radiation to the left upper lobe secondary to persistent hemoptysis followed by palliative treatment with carboplatin Alimta and Keytruda started December 4, 2019.  Completed 4 cycles on February 7, 2020.  The patient was started on Alimta with Keytruda on February 28, 2020.  The patient is here today for scheduled follow up visit with treatment cycle #7.     SUBJECTIVE: The patient is here today by himself. He has been doing well since his last visit. He has been more active in the last two weeks than he has been in a while, which has been encouraging for him. His breathing has been stable. He has occasional wheezing when he works out in the yard. He is eating and drinking well. He denies nausea or vomiting. His constipation has improved.     PAST MEDICAL  HISTORY/SOCIAL HISTORY/FAMILY HISTORY: Unchanged from my prior documentation done on 08/02/2016.     Review of Systems   Constitutional: Negative for activity change, appetite change, chills, fatigue, fever and unexpected weight change.   HENT: Negative for hearing loss, mouth sores, nosebleeds, sore throat and trouble swallowing.    Eyes: Negative for visual disturbance.        Tearing   Respiratory: Positive for cough and shortness of breath. Negative for chest tightness and wheezing.         With activity   Cardiovascular: Negative for chest pain, palpitations and leg swelling.   Gastrointestinal: Negative for abdominal distention, abdominal pain, blood in stool, constipation, diarrhea, nausea, rectal pain and vomiting.   Endocrine: Negative for cold intolerance and heat intolerance.   Genitourinary: Negative for difficulty urinating, dysuria, frequency and urgency.   Musculoskeletal: Negative for arthralgias, back pain, gait problem, joint swelling and myalgias.   Skin: Negative for rash.   Neurological: Negative for dizziness, tremors, syncope, weakness, light-headedness, numbness and headaches.   Hematological: Negative for adenopathy. Does not bruise/bleed easily.   Psychiatric/Behavioral: Negative for confusion, sleep disturbance and suicidal ideas. The patient is not nervous/anxious.          Current Outpatient Medications:   •  albuterol (PROVENTIL HFA;VENTOLIN HFA) 108 (90 BASE) MCG/ACT inhaler, Inhale 2 puffs every 4 (four) hours as needed for wheezing or shortness of air., Disp: , Rfl:   •  dexamethasone (DECADRON) 4 MG tablet, Take 1 tablet twice daily starting the day before chemo, day of chemo, and day after chemo.  Take with food., Disp: 30 tablet, Rfl: 3  •  dexamethasone (DECADRON) 4 MG tablet, Take 1 tablet twice daily starting the day before chemo, day of chemo, and day after chemo.  Take with food., Disp: 30 tablet, Rfl: 5  •  folic acid (FOLVITE) 1 MG tablet, Take 1 tablet by mouth Daily.  "Start at least 7 days prior to chemotherapy until at least 3 weeks after all chemotherapy., Disp: 30 tablet, Rfl: 5  •  guaiFENesin (MUCINEX) 600 MG 12 hr tablet, Take 1 tablet by mouth 2 (Two) Times a Day., Disp: 60 tablet, Rfl: 5  •  HYDROcodone-acetaminophen (NORCO) 7.5-325 MG per tablet, Take 1 tablet by mouth every 6 (six) hours as needed for moderate pain (4-6)., Disp: , Rfl:   •  INCRUSE ELLIPTA 62.5 MCG/INH aerosol powder , Inhale 1 puff Daily., Disp: , Rfl:   •  lactulose (CHRONULAC) 10 GM/15ML solution, Take 30 mL by mouth 3 (Three) Times a Day. PRN constipation, Disp: 240 mL, Rfl: 2  •  levocetirizine (XYZAL) 5 MG tablet, Take 5 mg by mouth Every Evening., Disp: , Rfl:   •  levothyroxine (SYNTHROID, LEVOTHROID) 75 MCG tablet, Take 75 mcg by mouth Daily., Disp: , Rfl:   •  omeprazole (priLOSEC) 40 MG capsule, Take 40 mg by mouth Daily., Disp: , Rfl:   •  ondansetron (ZOFRAN) 8 MG tablet, Take 1 tablet by mouth 3 (Three) Times a Day As Needed for Nausea or Vomiting., Disp: 30 tablet, Rfl: 5  •  promethazine (PHENERGAN) 25 MG tablet, , Disp: , Rfl:   •  SYMBICORT 160-4.5 MCG/ACT inhaler, INL 2 PUFFS PO BID, Disp: , Rfl: 3  •  tamsulosin (FLOMAX) 0.4 MG capsule 24 hr capsule, Take 1 capsule by mouth Daily., Disp: 30 capsule, Rfl: 6    PHYSICAL EXAMINATION:   BP (!) 186/78   Pulse 87   Temp 98 °F (36.7 °C) (Temporal)   Resp 16   Ht 167.6 cm (65.98\")   Wt 80.7 kg (178 lb)   SpO2 91%   BMI 28.74 kg/m²    Pain Score    07/06/20 1123   PainSc: 0-No pain      ECOG Performance Status: 1 - Symptomatic but completely ambulatory  General Appearance:  alert, cooperative, no apparent distress and appears stated age   Neurologic/Psychiatric: A&O x 3, gait steady, appropriate affect, strength 5/5 in all muscle groups   HEENT:  Normocephalic, without obvious abnormality, mucous membranes moist   Neck: Supple, symmetrical, trachea midline, no adenopathy;  No thyromegaly, masses, or tenderness   Lungs:   Scattered " expiratory wheezes throughout; respirations regular, even, and unlabored bilaterally   Heart:  Regular rate and rhythm, no murmurs appreciated   Abdomen:   Soft, non-tender, non-distended and no organomegaly   Lymph nodes: No cervical, supraclavicular, inguinal or axillary adenopathy noted   Extremities: Normal, atraumatic; no clubbing, cyanosis, or edema    Skin: No rashes, ulcers, or suspicious lesions noted     No visits with results within 2 Week(s) from this visit.   Latest known visit with results is:   Hospital Outpatient Visit on 06/12/2020   Component Date Value Ref Range Status   • Glucose 06/12/2020 185* 65 - 99 mg/dL Final   • BUN 06/12/2020 9  8 - 23 mg/dL Final   • Creatinine 06/12/2020 0.99  0.76 - 1.27 mg/dL Final   • Sodium 06/12/2020 141  136 - 145 mmol/L Final   • Potassium 06/12/2020 3.9  3.5 - 5.2 mmol/L Final   • Chloride 06/12/2020 105  98 - 107 mmol/L Final   • CO2 06/12/2020 24.0  22.0 - 29.0 mmol/L Final   • Calcium 06/12/2020 8.7  8.6 - 10.5 mg/dL Final   • Total Protein 06/12/2020 7.0  6.0 - 8.5 g/dL Final   • Albumin 06/12/2020 3.90  3.50 - 5.20 g/dL Final   • ALT (SGPT) 06/12/2020 15  1 - 41 U/L Final   • AST (SGOT) 06/12/2020 27  1 - 40 U/L Final   • Alkaline Phosphatase 06/12/2020 94  39 - 117 U/L Final   • Total Bilirubin 06/12/2020 0.2  0.2 - 1.2 mg/dL Final   • eGFR Non African Amer 06/12/2020 76  >60 mL/min/1.73 Final   • Globulin 06/12/2020 3.1  gm/dL Final   • A/G Ratio 06/12/2020 1.3  g/dL Final   • BUN/Creatinine Ratio 06/12/2020 9.1  7.0 - 25.0 Final   • Anion Gap 06/12/2020 12.0  5.0 - 15.0 mmol/L Final   • WBC 06/12/2020 4.20  3.40 - 10.80 10*3/mm3 Final   • RBC 06/12/2020 3.91* 4.14 - 5.80 10*6/mm3 Final   • Hemoglobin 06/12/2020 10.8* 13.0 - 17.7 g/dL Final   • Hematocrit 06/12/2020 35.9* 37.5 - 51.0 % Final   • RDW 06/12/2020 17.1* 12.3 - 15.4 % Final   • MCV 06/12/2020 91.8  79.0 - 97.0 fL Final   • MCH 06/12/2020 27.6  26.6 - 33.0 pg Final   • MCHC 06/12/2020 30.1*  31.5 - 35.7 g/dL Final   • MPV 06/12/2020 7.0  6.0 - 12.0 fL Final   • Platelets 06/12/2020 401  140 - 450 10*3/mm3 Final   • Neutrophil % 06/12/2020 85.9* 42.7 - 76.0 % Final   • Lymphocyte % 06/12/2020 9.2* 19.6 - 45.3 % Final   • Monocyte % 06/12/2020 4.9* 5.0 - 12.0 % Final   • Neutrophils, Absolute 06/12/2020 3.60  1.70 - 7.00 10*3/mm3 Final   • Lymphocytes, Absolute 06/12/2020 0.40* 0.70 - 3.10 10*3/mm3 Final   • Monocytes, Absolute 06/12/2020 0.20  0.10 - 0.90 10*3/mm3 Final   • Creatinine 06/12/2020 0.90  mg/dL Final   • Creatinine 06/12/2020 0.90  0.60 - 1.30 mg/dL Final    Serial Number: [id number]:  [id number]        No results found.(  ASSESSMENT: The patient is a very pleasant 64 y.o. with metastatic adenocarcinoma of the lung    PROBLEM LIST:  1. Non-small cell lung cancer originally diagnosed in 2009 status post right upper lobectomy done by Dr. Real in 2009.  2. Left upper lobe lung nodules found on surveillance CT in 2012, status post left upper lobe wedge resection with benign pathology.  3.  Left lung cancer, adenocarcinoma:  A.  Presented as a new left lower lobe lung nodule found on chest x-ray done 6/2016.   B. CT scan of the chest done 6/16/2016 showing interval enlargement of left lung nodules. PET CT showing hypermetabolic activity in the left lower lobe nodule.   C. CT guided biopsy of left lower lobe nodule positive for adenocarcinoma of the lung. [id number], stage Ia.  C. Status post CyberKnife therapy completed 9/29/2016 per Dr. Lesia CHÁVEZ.  Repeated PET scan done on October 2019 revealed increase in size as well as hypermetabolic activity left upper lobe lung nodule with a new hypermetabolic active right supraclavicular lymph node and essentially other stable findings.  E.  Status post bronchoscopy with biopsy done by Dr. You November 6, 2019 revealed adenocarcinoma from level 4 left and suspicious cytology from left upper lobe.  F.  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  No enough tissue to do PDL 1 testing.  G.  Started carbo Alimta and Keytruda December 04, 2019, status post 4 cycle  H. Started maintenance Alimta/Keytruda on 2/28/2020 status post 6 cycles.   4.  Hemoptysis:  A.  Status post venous embolization done by Dr. Camp November 7, 2019  B.  Completed by radiation to left upper lobe mass November 26, 2019  4.  Black lung  5. Sarcoidosis  6. Hypothyroidism  7.  Conjunctivitis  8.  Treatment induced nausea    PLAN:  1. We will proceed today using Alimta/Keytruda cycle #7 as planned.   2. We will see the patient back in 3 weeks for maintenance regimen using Alimta/Keytruda cycle #8.    3. We will continue to monitor the patient's labs throughout treatment including blood counts, kidney function, thyroid function, and liver functions.   4.  We reviewed again the potential side effects of immunotherapy including but not limited to immune mediated reactions with thyroiditis, pneumonitis, hepatitis, colitis, rash, and electrolytes abnormalities, fatigue, multiorgan failure, and possibly death.  5. We reviewed again the potential side effects of this regimen including fatigue, vomiting and nausea, hair loss, nephropathy, neuropathy, hearing loss, myelosuppression, and risk of infusion reaction.  6.  I will do 3 months follow-up imaging which will be due August 2020.  I reviewed again his PET results from May that showed slight progression versus inflammation and post-radiation change. He continues to feel well, and we will further evaluate these changes in next scan.   7.  The patient can continue Mucinex 600 mg take by mouth twice per day as needed for congestion and cough.   8. He will continue levothyroxine 75 mcg for hypothyroidism. We will adjust his dose if needed for fluctuations in TSH.   9.  We will continue Zofran as needed for chemotherapy-induced nausea.  10.  We will continue folic acid, vitamin B12, and Decadron per Alimta  protocol.  11. The patient will continue Miralax, Senakot, and Lactulose for constipation.   12.  He will continue saline eye drops for conjunctivitis.    Snehal Stallworth, APRN  7/6/2020

## 2020-07-07 RX ORDER — TAMSULOSIN HYDROCHLORIDE 0.4 MG/1
1 CAPSULE ORAL DAILY
Qty: 30 CAPSULE | Refills: 0 | Status: SHIPPED | OUTPATIENT
Start: 2020-07-07

## 2020-07-07 NOTE — TELEPHONE ENCOUNTER
Fax refill request for Rx Tamsulosin 0.4 mg approved and faxed per chart with 30 day supply to [facility]. Pt will need to contact PCP for further refills.

## 2020-07-31 ENCOUNTER — HOSPITAL ENCOUNTER (OUTPATIENT)
Dept: ONCOLOGY | Facility: HOSPITAL | Age: 65
Setting detail: INFUSION SERIES
Discharge: HOME | End: 2020-07-31

## 2020-07-31 ENCOUNTER — OFFICE VISIT (OUTPATIENT)
Dept: ONCOLOGY | Facility: CLINIC | Age: 65
End: 2020-07-31

## 2020-07-31 VITALS
HEIGHT: 66 IN | WEIGHT: 181 LBS | DIASTOLIC BLOOD PRESSURE: 91 MMHG | HEART RATE: 99 BPM | TEMPERATURE: 98.6 F | RESPIRATION RATE: 18 BRPM | SYSTOLIC BLOOD PRESSURE: 154 MMHG | BODY MASS INDEX: 29.09 KG/M2 | OXYGEN SATURATION: 98 %

## 2020-07-31 DIAGNOSIS — C34.12 MALIGNANT NEOPLASM OF UPPER LOBE OF LEFT LUNG (HCC): ICD-10-CM

## 2020-07-31 DIAGNOSIS — C34.32 MALIGNANT NEOPLASM OF LOWER LOBE OF LEFT LUNG (HCC): Primary | ICD-10-CM

## 2020-07-31 DIAGNOSIS — C34.12 MALIGNANT NEOPLASM OF UPPER LOBE OF LEFT LUNG (HCC): Primary | ICD-10-CM

## 2020-07-31 LAB
ALBUMIN SERPL-MCNC: 4 G/DL (ref 3.5–5.2)
ALBUMIN/GLOB SERPL: 1.5 G/DL
ALP SERPL-CCNC: 88 U/L (ref 39–117)
ALT SERPL W P-5'-P-CCNC: 15 U/L (ref 1–41)
ANION GAP SERPL CALCULATED.3IONS-SCNC: 10 MMOL/L (ref 5–15)
AST SERPL-CCNC: 24 U/L (ref 1–40)
BILIRUB SERPL-MCNC: 0.3 MG/DL (ref 0–1.2)
BUN SERPL-MCNC: 6 MG/DL (ref 8–23)
BUN/CREAT SERPL: 5.8 (ref 7–25)
CALCIUM SPEC-SCNC: 8.9 MG/DL (ref 8.6–10.5)
CHLORIDE SERPL-SCNC: 108 MMOL/L (ref 98–107)
CO2 SERPL-SCNC: 24 MMOL/L (ref 22–29)
CREAT SERPL-MCNC: 1.03 MG/DL (ref 0.76–1.27)
ERYTHROCYTE [DISTWIDTH] IN BLOOD BY AUTOMATED COUNT: 19.9 % (ref 12.3–15.4)
GFR SERPL CREATININE-BSD FRML MDRD: 72 ML/MIN/1.73
GLOBULIN UR ELPH-MCNC: 2.6 GM/DL
GLUCOSE SERPL-MCNC: 189 MG/DL (ref 65–99)
HCT VFR BLD AUTO: 40.5 % (ref 37.5–51)
HGB BLD-MCNC: 12.4 G/DL (ref 13–17.7)
LYMPHOCYTES # BLD AUTO: 0.3 10*3/MM3 (ref 0.7–3.1)
LYMPHOCYTES NFR BLD AUTO: 6.1 % (ref 19.6–45.3)
MCH RBC QN AUTO: 28.6 PG (ref 26.6–33)
MCHC RBC AUTO-ENTMCNC: 30.6 G/DL (ref 31.5–35.7)
MCV RBC AUTO: 93.2 FL (ref 79–97)
MONOCYTES # BLD AUTO: 0.1 10*3/MM3 (ref 0.1–0.9)
MONOCYTES NFR BLD AUTO: 1.4 % (ref 5–12)
NEUTROPHILS NFR BLD AUTO: 5.3 10*3/MM3 (ref 1.7–7)
NEUTROPHILS NFR BLD AUTO: 92.5 % (ref 42.7–76)
PLATELET # BLD AUTO: 309 10*3/MM3 (ref 140–450)
PMV BLD AUTO: 7.1 FL (ref 6–12)
POTASSIUM SERPL-SCNC: 3.8 MMOL/L (ref 3.5–5.2)
PROT SERPL-MCNC: 6.6 G/DL (ref 6–8.5)
RBC # BLD AUTO: 4.34 10*6/MM3 (ref 4.14–5.8)
SODIUM SERPL-SCNC: 142 MMOL/L (ref 136–145)
WBC # BLD AUTO: 5.7 10*3/MM3 (ref 3.4–10.8)

## 2020-07-31 PROCEDURE — 96411 CHEMO IV PUSH ADDL DRUG: CPT

## 2020-07-31 PROCEDURE — 85025 COMPLETE CBC W/AUTO DIFF WBC: CPT | Performed by: INTERNAL MEDICINE

## 2020-07-31 PROCEDURE — 25010000002 PEMBROLIZUMAB 100 MG/4ML SOLUTION 4 ML VIAL: Performed by: INTERNAL MEDICINE

## 2020-07-31 PROCEDURE — 96372 THER/PROPH/DIAG INJ SC/IM: CPT

## 2020-07-31 PROCEDURE — 99215 OFFICE O/P EST HI 40 MIN: CPT | Performed by: INTERNAL MEDICINE

## 2020-07-31 PROCEDURE — 25010000002 PEMETREXED 100 MG RECONSTITUTED SOLUTION 100 MG VIAL: Performed by: INTERNAL MEDICINE

## 2020-07-31 PROCEDURE — 96413 CHEMO IV INFUSION 1 HR: CPT

## 2020-07-31 PROCEDURE — 80053 COMPREHEN METABOLIC PANEL: CPT | Performed by: INTERNAL MEDICINE

## 2020-07-31 PROCEDURE — 25010000002 CYANOCOBALAMIN PER 1000 MCG: Performed by: INTERNAL MEDICINE

## 2020-07-31 PROCEDURE — 82565 ASSAY OF CREATININE: CPT

## 2020-07-31 PROCEDURE — 25010000002 PEMETREXED PER 10 MG: Performed by: INTERNAL MEDICINE

## 2020-07-31 RX ORDER — SODIUM CHLORIDE 9 MG/ML
250 INJECTION, SOLUTION INTRAVENOUS ONCE
Status: DISCONTINUED | OUTPATIENT
Start: 2020-07-31 | End: 2020-08-01 | Stop reason: HOSPADM

## 2020-07-31 RX ORDER — CYANOCOBALAMIN 1000 UG/ML
1000 INJECTION, SOLUTION INTRAMUSCULAR; SUBCUTANEOUS ONCE
Status: COMPLETED | OUTPATIENT
Start: 2020-07-31 | End: 2020-07-31

## 2020-07-31 RX ORDER — SODIUM CHLORIDE 9 MG/ML
250 INJECTION, SOLUTION INTRAVENOUS ONCE
Status: CANCELLED | OUTPATIENT
Start: 2020-07-31

## 2020-07-31 RX ORDER — CYANOCOBALAMIN 1000 UG/ML
1000 INJECTION, SOLUTION INTRAMUSCULAR; SUBCUTANEOUS ONCE
Status: CANCELLED | OUTPATIENT
Start: 2020-07-31

## 2020-07-31 RX ADMIN — SODIUM CHLORIDE 900 MG: 9 INJECTION, SOLUTION INTRAVENOUS at 11:24

## 2020-07-31 RX ADMIN — CYANOCOBALAMIN 1000 MCG: 1000 INJECTION, SOLUTION INTRAMUSCULAR; SUBCUTANEOUS at 10:41

## 2020-07-31 RX ADMIN — SODIUM CHLORIDE 200 MG: 9 INJECTION, SOLUTION INTRAVENOUS at 10:25

## 2020-07-31 NOTE — PROGRESS NOTES
DATE OF VISIT: 7/31/2020    REASON FOR VISIT: Recurrent adenocarcinoma of the lung       HISTORY OF PRESENT ILLNESS: The patient is a very pleasant 64 y.o. male  with past medical history significant for lung cancer diagnosed 6/22/2016 . 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 PET scan done 6/22/2016 showed findings consistent with recurrent neoplasm in the left upper lobe, with metastatic adenopathy to the left hilum and AP window as well as a metastatic nodule in the left lower lobe. CT-guided biopsy of the left lower lobe nodule was performed that showed adenocarcinoma of the lung. The patient underwent CyberKnife therapy by Dr Pinzon completed 9/29/2016.  Repeat scan showed gradual increase in size of left upper lobe lung infiltrates.  Patient had whole-body PET scan that revealed increased metabolic activity in the left lung infiltrate as well as a new right supraclavicular lymph nodes.  Patient had bronchoscopy with biopsy done by Dr. You on November 6, 2019 that revealed adenocarcinoma and level 4 left mediastinal lymph node.  He received palliative course of radiation to the left upper lobe secondary to persistent hemoptysis followed by palliative treatment with carboplatin Alimta and Keytruda started December 4, 2019.  Completed 4 cycles on February 7, 2020.  The patient was started on Alimta with Keytruda on February 28, 2020.  The patient is here today for scheduled follow up visit with treatment cycle #8.     SUBJECTIVE: The patient is here today by himself.  He is doing fairly well.  He denies any fever chills night sweats.  His energy is better.    PAST MEDICAL HISTORY/SOCIAL HISTORY/FAMILY HISTORY: Unchanged from my prior documentation done on 08/02/2016.     Review of Systems   Constitutional: Negative for activity change, appetite change, chills, fatigue, fever and unexpected weight change.   HENT: Negative  for hearing loss, mouth sores, nosebleeds, sore throat and trouble swallowing.    Eyes: Negative for visual disturbance.        Tearing   Respiratory: Positive for cough and shortness of breath. Negative for chest tightness and wheezing.         With activity   Cardiovascular: Negative for chest pain, palpitations and leg swelling.   Gastrointestinal: Negative for abdominal distention, abdominal pain, blood in stool, constipation, diarrhea, nausea, rectal pain and vomiting.   Endocrine: Negative for cold intolerance and heat intolerance.   Genitourinary: Negative for difficulty urinating, dysuria, frequency and urgency.   Musculoskeletal: Negative for arthralgias, back pain, gait problem, joint swelling and myalgias.   Skin: Negative for rash.   Neurological: Negative for dizziness, tremors, syncope, weakness, light-headedness, numbness and headaches.   Hematological: Negative for adenopathy. Does not bruise/bleed easily.   Psychiatric/Behavioral: Negative for confusion, sleep disturbance and suicidal ideas. The patient is not nervous/anxious.          Current Outpatient Medications:   •  albuterol (PROVENTIL HFA;VENTOLIN HFA) 108 (90 BASE) MCG/ACT inhaler, Inhale 2 puffs every 4 (four) hours as needed for wheezing or shortness of air., Disp: , Rfl:   •  dexamethasone (DECADRON) 4 MG tablet, Take 1 tablet twice daily starting the day before chemo, day of chemo, and day after chemo.  Take with food., Disp: 30 tablet, Rfl: 3  •  dexamethasone (DECADRON) 4 MG tablet, Take 1 tablet twice daily starting the day before chemo, day of chemo, and day after chemo.  Take with food., Disp: 30 tablet, Rfl: 5  •  folic acid (FOLVITE) 1 MG tablet, Take 1 tablet by mouth Daily. Start at least 7 days prior to chemotherapy until at least 3 weeks after all chemotherapy., Disp: 30 tablet, Rfl: 5  •  guaiFENesin (MUCINEX) 600 MG 12 hr tablet, Take 1 tablet by mouth 2 (Two) Times a Day., Disp: 60 tablet, Rfl: 5  •   "HYDROcodone-acetaminophen (NORCO) 7.5-325 MG per tablet, Take 1 tablet by mouth every 6 (six) hours as needed for moderate pain (4-6)., Disp: , Rfl:   •  INCRUSE ELLIPTA 62.5 MCG/INH aerosol powder , Inhale 1 puff Daily., Disp: , Rfl:   •  lactulose (CHRONULAC) 10 GM/15ML solution, Take 30 mL by mouth 3 (Three) Times a Day. PRN constipation, Disp: 240 mL, Rfl: 2  •  levocetirizine (XYZAL) 5 MG tablet, Take 5 mg by mouth Every Evening., Disp: , Rfl:   •  levothyroxine (SYNTHROID, LEVOTHROID) 75 MCG tablet, Take 75 mcg by mouth Daily., Disp: , Rfl:   •  omeprazole (priLOSEC) 40 MG capsule, Take 40 mg by mouth Daily., Disp: , Rfl:   •  ondansetron (ZOFRAN) 8 MG tablet, Take 1 tablet by mouth 3 (Three) Times a Day As Needed for Nausea or Vomiting., Disp: 30 tablet, Rfl: 5  •  promethazine (PHENERGAN) 25 MG tablet, , Disp: , Rfl:   •  SYMBICORT 160-4.5 MCG/ACT inhaler, INL 2 PUFFS PO BID, Disp: , Rfl: 3  •  tamsulosin (FLOMAX) 0.4 MG capsule 24 hr capsule, Take 1 capsule by mouth Daily. Pt will need to contact PCP for further refills., Disp: 30 capsule, Rfl: 0    PHYSICAL EXAMINATION:   /91   Pulse 99   Temp 98.6 °F (37 °C) (Temporal)   Resp 18   Ht 167.6 cm (65.98\")   Wt 82.1 kg (181 lb)   SpO2 98%   BMI 29.23 kg/m²    Pain Score    07/31/20 0907   PainSc: 0-No pain      ECOG Performance Status: 1 - Symptomatic but completely ambulatory  General Appearance:  alert, cooperative, no apparent distress and appears stated age   Neurologic/Psychiatric: A&O x 3, gait steady, appropriate affect, strength 5/5 in all muscle groups   HEENT:  Normocephalic, without obvious abnormality, mucous membranes moist   Neck: Supple, symmetrical, trachea midline, no adenopathy;  No thyromegaly, masses, or tenderness   Lungs:   Scattered expiratory wheezes throughout; respirations regular, even, and unlabored bilaterally   Heart:  Regular rate and rhythm, no murmurs appreciated   Abdomen:   Soft, non-tender, non-distended and no " organomegaly   Lymph nodes: No cervical, supraclavicular, inguinal or axillary adenopathy noted   Extremities: Normal, atraumatic; no clubbing, cyanosis, or edema    Skin: No rashes, ulcers, or suspicious lesions noted     No visits with results within 2 Week(s) from this visit.   Latest known visit with results is:   Hospital Outpatient Visit on 07/06/2020   Component Date Value Ref Range Status   • Glucose 07/06/2020 166* 65 - 99 mg/dL Final   • BUN 07/06/2020 10  8 - 23 mg/dL Final   • Creatinine 07/06/2020 0.95  0.76 - 1.27 mg/dL Final   • Sodium 07/06/2020 141  136 - 145 mmol/L Final   • Potassium 07/06/2020 3.4* 3.5 - 5.2 mmol/L Final    Specimen hemolyzed.  Results may be affected.   • Chloride 07/06/2020 105  98 - 107 mmol/L Final   • CO2 07/06/2020 23.0  22.0 - 29.0 mmol/L Final   • Calcium 07/06/2020 9.1  8.6 - 10.5 mg/dL Final   • Total Protein 07/06/2020 7.1  6.0 - 8.5 g/dL Final   • Albumin 07/06/2020 4.00  3.50 - 5.20 g/dL Final   • ALT (SGPT) 07/06/2020 15  1 - 41 U/L Final   • AST (SGOT) 07/06/2020 25  1 - 40 U/L Final   • Alkaline Phosphatase 07/06/2020 88  39 - 117 U/L Final   • Total Bilirubin 07/06/2020 0.2  0.2 - 1.2 mg/dL Final   • eGFR Non African Amer 07/06/2020 80  >60 mL/min/1.73 Final   • Globulin 07/06/2020 3.1  gm/dL Final   • A/G Ratio 07/06/2020 1.3  g/dL Final   • BUN/Creatinine Ratio 07/06/2020 10.5  7.0 - 25.0 Final   • Anion Gap 07/06/2020 13.0  5.0 - 15.0 mmol/L Final   • TSH 07/06/2020 1.710  0.270 - 4.200 uIU/mL Final   • Free T4 07/06/2020 1.18  0.93 - 1.70 ng/dL Final   • WBC 07/06/2020 5.30  3.40 - 10.80 10*3/mm3 Final   • RBC 07/06/2020 4.09* 4.14 - 5.80 10*6/mm3 Final   • Hemoglobin 07/06/2020 11.6* 13.0 - 17.7 g/dL Final   • Hematocrit 07/06/2020 38.2  37.5 - 51.0 % Final   • RDW 07/06/2020 19.4* 12.3 - 15.4 % Final   • MCV 07/06/2020 93.5  79.0 - 97.0 fL Final   • MCH 07/06/2020 28.5  26.6 - 33.0 pg Final   • MCHC 07/06/2020 30.4* 31.5 - 35.7 g/dL Final   • MPV 07/06/2020  6.9  6.0 - 12.0 fL Final   • Platelets 07/06/2020 287  140 - 450 10*3/mm3 Final   • Neutrophil % 07/06/2020 84.5* 42.7 - 76.0 % Final   • Lymphocyte % 07/06/2020 10.2* 19.6 - 45.3 % Final   • Monocyte % 07/06/2020 5.3  5.0 - 12.0 % Final   • Neutrophils, Absolute 07/06/2020 4.50  1.70 - 7.00 10*3/mm3 Final   • Lymphocytes, Absolute 07/06/2020 0.50* 0.70 - 3.10 10*3/mm3 Final   • Monocytes, Absolute 07/06/2020 0.30  0.10 - 0.90 10*3/mm3 Final        No results found.(  ASSESSMENT: The patient is a very pleasant 64 y.o. with metastatic adenocarcinoma of the lung    PROBLEM LIST:  1. Non-small cell lung cancer originally diagnosed in 2009 status post right upper lobectomy done by Dr. Real in 2009.  2. Left upper lobe lung nodules found on surveillance CT in 2012, status post left upper lobe wedge resection with benign pathology.  3.  Left lung cancer, adenocarcinoma:  A.  Presented as a new left lower lobe lung nodule found on chest x-ray done 6/2016.   B. CT scan of the chest done 6/16/2016 showing interval enlargement of left lung nodules. PET CT showing hypermetabolic activity in the left lower lobe nodule.   C. CT guided biopsy of left lower lobe nodule positive for adenocarcinoma of the lung. [id number], stage Ia.  C. Status post CyberKnife therapy completed 9/29/2016 per Dr. Lesia CHÁVEZ.  Repeated PET scan done on October 2019 revealed increase in size as well as hypermetabolic activity left upper lobe lung nodule with a new hypermetabolic active right supraclavicular lymph node and essentially other stable findings.  E.  Status post bronchoscopy with biopsy done by Dr. You November 6, 2019 revealed adenocarcinoma from level 4 left and suspicious cytology from left upper lobe.  F.  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  No enough tissue to do PDL 1 testing.  G.  Started carbo Alimta and Keytruda December 04, 2019, status post 4 cycle  H. Started maintenance  Alimta/Keytruda on 2/28/2020 status post 7 cycles.   4.  Hemoptysis:  A.  Status post venous embolization done by Dr. Camp November 7, 2019  B.  Completed by radiation to left upper lobe mass November 26, 2019  4.  Black lung  5. Sarcoidosis  6. Hypothyroidism  7.  Conjunctivitis  8.  Treatment induced nausea    PLAN:  1. We will proceed today using Alimta/Keytruda cycle #8 as planned.   2. We will see the patient back in 3 weeks for maintenance regimen using Alimta/Keytruda cycle #9.    3. We will continue to monitor the patient's labs throughout treatment including blood counts, kidney function, thyroid function, and liver functions.   4.  We reviewed again the potential side effects of immunotherapy including but not limited to immune mediated reactions with thyroiditis, pneumonitis, hepatitis, colitis, rash, and electrolytes abnormalities, fatigue, multiorgan failure, and possibly death.  5. We reviewed again the potential side effects of this regimen including fatigue, vomiting and nausea, hair loss, nephropathy, neuropathy, hearing loss, myelosuppression, and risk of infusion reaction.  6.  I will do 3 months follow-up PET imaging which will be due August 2020.  This would be ordered prior to return.  7.  The patient can continue Mucinex 600 mg take by mouth twice per day as needed for congestion and cough.   8. He will continue levothyroxine 75 mcg for hypothyroidism. We will adjust his dose if needed for fluctuations in TSH.   9.  We will continue Zofran as needed for chemotherapy-induced nausea.  10.  We will continue folic acid, vitamin B12, and Decadron per Alimta protocol.  11. The patient will continue Miralax, Senakot, and Lactulose for constipation.   12.  He will continue saline eye drops for conjunctivitis.    Padmaja Denny MD  7/31/2020

## 2020-08-20 ENCOUNTER — HOSPITAL ENCOUNTER (OUTPATIENT)
Dept: PET IMAGING | Facility: HOSPITAL | Age: 65
Discharge: HOME | End: 2020-08-20
Admitting: INTERNAL MEDICINE

## 2020-08-20 ENCOUNTER — HOSPITAL ENCOUNTER (OUTPATIENT)
Dept: PET IMAGING | Facility: HOSPITAL | Age: 65
Discharge: HOME | End: 2020-08-20

## 2020-08-20 DIAGNOSIS — C34.12 MALIGNANT NEOPLASM OF UPPER LOBE OF LEFT LUNG (HCC): ICD-10-CM

## 2020-08-20 DIAGNOSIS — C34.32 MALIGNANT NEOPLASM OF LOWER LOBE OF LEFT LUNG (HCC): ICD-10-CM

## 2020-08-20 LAB — GLUCOSE BLDC GLUCOMTR-MCNC: 97 MG/DL (ref 70–130)

## 2020-08-20 PROCEDURE — A9552 F18 FDG: HCPCS | Performed by: INTERNAL MEDICINE

## 2020-08-20 PROCEDURE — 82962 GLUCOSE BLOOD TEST: CPT

## 2020-08-20 PROCEDURE — 0 FLUDEOXYGLUCOSE F18 SOLUTION: Performed by: INTERNAL MEDICINE

## 2020-08-20 PROCEDURE — 78815 PET IMAGE W/CT SKULL-THIGH: CPT

## 2020-08-20 RX ADMIN — FLUDEOXYGLUCOSE F18 1 DOSE: 300 INJECTION INTRAVENOUS at 09:08

## 2020-08-21 ENCOUNTER — OFFICE VISIT (OUTPATIENT)
Dept: ONCOLOGY | Facility: CLINIC | Age: 65
End: 2020-08-21

## 2020-08-21 ENCOUNTER — HOSPITAL ENCOUNTER (OUTPATIENT)
Dept: ONCOLOGY | Facility: HOSPITAL | Age: 65
Setting detail: INFUSION SERIES
Discharge: HOME | End: 2020-08-21

## 2020-08-21 VITALS
TEMPERATURE: 98.2 F | BODY MASS INDEX: 29.19 KG/M2 | OXYGEN SATURATION: 98 % | HEIGHT: 65 IN | RESPIRATION RATE: 16 BRPM | SYSTOLIC BLOOD PRESSURE: 150 MMHG | HEART RATE: 74 BPM | WEIGHT: 175.2 LBS | DIASTOLIC BLOOD PRESSURE: 80 MMHG

## 2020-08-21 DIAGNOSIS — C34.32 MALIGNANT NEOPLASM OF LOWER LOBE OF LEFT LUNG (HCC): Primary | ICD-10-CM

## 2020-08-21 DIAGNOSIS — C34.12 MALIGNANT NEOPLASM OF UPPER LOBE OF LEFT LUNG (HCC): Primary | ICD-10-CM

## 2020-08-21 DIAGNOSIS — C34.12 MALIGNANT NEOPLASM OF UPPER LOBE OF LEFT LUNG (HCC): ICD-10-CM

## 2020-08-21 LAB
ALBUMIN SERPL-MCNC: 4.6 G/DL (ref 3.5–5.2)
ALBUMIN/GLOB SERPL: 1.4 G/DL
ALP SERPL-CCNC: 83 U/L (ref 39–117)
ALT SERPL W P-5'-P-CCNC: 13 U/L (ref 1–41)
ANION GAP SERPL CALCULATED.3IONS-SCNC: 12 MMOL/L (ref 5–15)
AST SERPL-CCNC: 26 U/L (ref 1–40)
BILIRUB SERPL-MCNC: 0.3 MG/DL (ref 0–1.2)
BUN SERPL-MCNC: 11 MG/DL (ref 8–23)
BUN/CREAT SERPL: 9.2 (ref 7–25)
CALCIUM SPEC-SCNC: 9.3 MG/DL (ref 8.6–10.5)
CHLORIDE SERPL-SCNC: 104 MMOL/L (ref 98–107)
CO2 SERPL-SCNC: 25 MMOL/L (ref 22–29)
CREAT BLDA-MCNC: 1.1 MG/DL
CREAT SERPL-MCNC: 1.19 MG/DL (ref 0.76–1.27)
ERYTHROCYTE [DISTWIDTH] IN BLOOD BY AUTOMATED COUNT: 18.7 % (ref 12.3–15.4)
GFR SERPL CREATININE-BSD FRML MDRD: 61 ML/MIN/1.73
GLOBULIN UR ELPH-MCNC: 3.2 GM/DL
GLUCOSE SERPL-MCNC: 118 MG/DL (ref 65–99)
HCT VFR BLD AUTO: 40.5 % (ref 37.5–51)
HGB BLD-MCNC: 13.2 G/DL (ref 13–17.7)
LYMPHOCYTES # BLD AUTO: 0.9 10*3/MM3 (ref 0.7–3.1)
LYMPHOCYTES NFR BLD AUTO: 12.3 % (ref 19.6–45.3)
MCH RBC QN AUTO: 29.8 PG (ref 26.6–33)
MCHC RBC AUTO-ENTMCNC: 32.6 G/DL (ref 31.5–35.7)
MCV RBC AUTO: 91.3 FL (ref 79–97)
MONOCYTES # BLD AUTO: 0.2 10*3/MM3 (ref 0.1–0.9)
MONOCYTES NFR BLD AUTO: 3.1 % (ref 5–12)
NEUTROPHILS NFR BLD AUTO: 6.5 10*3/MM3 (ref 1.7–7)
NEUTROPHILS NFR BLD AUTO: 84.6 % (ref 42.7–76)
PLATELET # BLD AUTO: 312 10*3/MM3 (ref 140–450)
PMV BLD AUTO: 7 FL (ref 6–12)
POTASSIUM SERPL-SCNC: 3.3 MMOL/L (ref 3.5–5.2)
PROT SERPL-MCNC: 7.8 G/DL (ref 6–8.5)
RBC # BLD AUTO: 4.44 10*6/MM3 (ref 4.14–5.8)
SODIUM SERPL-SCNC: 141 MMOL/L (ref 136–145)
T4 FREE SERPL-MCNC: 1.24 NG/DL (ref 0.93–1.7)
TSH SERPL DL<=0.05 MIU/L-ACNC: 1.15 UIU/ML (ref 0.27–4.2)
WBC # BLD AUTO: 7.7 10*3/MM3 (ref 3.4–10.8)

## 2020-08-21 PROCEDURE — 96411 CHEMO IV PUSH ADDL DRUG: CPT

## 2020-08-21 PROCEDURE — 25010000002 PEMETREXED 100 MG RECONSTITUTED SOLUTION 100 MG VIAL: Performed by: INTERNAL MEDICINE

## 2020-08-21 PROCEDURE — 96409 CHEMO IV PUSH SNGL DRUG: CPT

## 2020-08-21 PROCEDURE — 80053 COMPREHEN METABOLIC PANEL: CPT | Performed by: INTERNAL MEDICINE

## 2020-08-21 PROCEDURE — 99214 OFFICE O/P EST MOD 30 MIN: CPT | Performed by: INTERNAL MEDICINE

## 2020-08-21 PROCEDURE — 84443 ASSAY THYROID STIM HORMONE: CPT | Performed by: INTERNAL MEDICINE

## 2020-08-21 PROCEDURE — 25010000002 PEMBROLIZUMAB 100 MG/4ML SOLUTION 4 ML VIAL: Performed by: INTERNAL MEDICINE

## 2020-08-21 PROCEDURE — 82565 ASSAY OF CREATININE: CPT

## 2020-08-21 PROCEDURE — 36415 COLL VENOUS BLD VENIPUNCTURE: CPT

## 2020-08-21 PROCEDURE — 96413 CHEMO IV INFUSION 1 HR: CPT

## 2020-08-21 PROCEDURE — 84439 ASSAY OF FREE THYROXINE: CPT | Performed by: INTERNAL MEDICINE

## 2020-08-21 PROCEDURE — 85025 COMPLETE CBC W/AUTO DIFF WBC: CPT | Performed by: INTERNAL MEDICINE

## 2020-08-21 PROCEDURE — 25010000002 PEMETREXED PER 10 MG: Performed by: INTERNAL MEDICINE

## 2020-08-21 RX ORDER — SODIUM CHLORIDE 9 MG/ML
250 INJECTION, SOLUTION INTRAVENOUS ONCE
Status: COMPLETED | OUTPATIENT
Start: 2020-08-21 | End: 2020-08-21

## 2020-08-21 RX ORDER — SODIUM CHLORIDE 9 MG/ML
250 INJECTION, SOLUTION INTRAVENOUS ONCE
Status: CANCELLED | OUTPATIENT
Start: 2020-08-21

## 2020-08-21 RX ADMIN — SODIUM CHLORIDE 250 ML: 9 INJECTION, SOLUTION INTRAVENOUS at 10:06

## 2020-08-21 RX ADMIN — SODIUM CHLORIDE 900 MG: 9 INJECTION, SOLUTION INTRAVENOUS at 10:45

## 2020-08-21 RX ADMIN — SODIUM CHLORIDE 200 MG: 9 INJECTION, SOLUTION INTRAVENOUS at 10:07

## 2020-08-21 NOTE — PROGRESS NOTES
DATE OF VISIT: 8/21/2020    REASON FOR VISIT: Recurrent adenocarcinoma of the lung       HISTORY OF PRESENT ILLNESS: The patient is a very pleasant 64 y.o. male  with past medical history significant for lung cancer diagnosed 6/22/2016 . 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 PET scan done 6/22/2016 showed findings consistent with recurrent neoplasm in the left upper lobe, with metastatic adenopathy to the left hilum and AP window as well as a metastatic nodule in the left lower lobe. CT-guided biopsy of the left lower lobe nodule was performed that showed adenocarcinoma of the lung. The patient underwent CyberKnife therapy by Dr Pinzon completed 9/29/2016.  Repeat scan showed gradual increase in size of left upper lobe lung infiltrates.  Patient had whole-body PET scan that revealed increased metabolic activity in the left lung infiltrate as well as a new right supraclavicular lymph nodes.  Patient had bronchoscopy with biopsy done by Dr. You on November 6, 2019 that revealed adenocarcinoma and level 4 left mediastinal lymph node.  He received palliative course of radiation to the left upper lobe secondary to persistent hemoptysis followed by palliative treatment with carboplatin Alimta and Keytruda started December 4, 2019.  Completed 4 cycles on February 7, 2020.  The patient was started on Alimta with Keytruda on February 28, 2020.  The patient is here today for scheduled follow up visit with treatment cycle #9.     SUBJECTIVE: The patient is here today by himself.  He is doing fairly well.  He denied any fever chills night sweats.  He is anxious about the scan results.    PAST MEDICAL HISTORY/SOCIAL HISTORY/FAMILY HISTORY: Unchanged from my prior documentation done on 08/02/2016.     Review of Systems   Constitutional: Negative for activity change, appetite change, chills, fatigue, fever and unexpected weight change.    HENT: Negative for hearing loss, mouth sores, nosebleeds, sore throat and trouble swallowing.    Eyes: Negative for visual disturbance.        Tearing   Respiratory: Positive for cough and shortness of breath. Negative for chest tightness and wheezing.         With activity   Cardiovascular: Negative for chest pain, palpitations and leg swelling.   Gastrointestinal: Negative for abdominal distention, abdominal pain, blood in stool, constipation, diarrhea, nausea, rectal pain and vomiting.   Endocrine: Negative for cold intolerance and heat intolerance.   Genitourinary: Negative for difficulty urinating, dysuria, frequency and urgency.   Musculoskeletal: Negative for arthralgias, back pain, gait problem, joint swelling and myalgias.   Skin: Negative for rash.   Neurological: Negative for dizziness, tremors, syncope, weakness, light-headedness, numbness and headaches.   Hematological: Negative for adenopathy. Does not bruise/bleed easily.   Psychiatric/Behavioral: Negative for confusion, sleep disturbance and suicidal ideas. The patient is not nervous/anxious.          Current Outpatient Medications:   •  albuterol (PROVENTIL HFA;VENTOLIN HFA) 108 (90 BASE) MCG/ACT inhaler, Inhale 2 puffs every 4 (four) hours as needed for wheezing or shortness of air., Disp: , Rfl:   •  dexamethasone (DECADRON) 4 MG tablet, Take 1 tablet twice daily starting the day before chemo, day of chemo, and day after chemo.  Take with food., Disp: 30 tablet, Rfl: 3  •  dexamethasone (DECADRON) 4 MG tablet, Take 1 tablet twice daily starting the day before chemo, day of chemo, and day after chemo.  Take with food., Disp: 30 tablet, Rfl: 5  •  folic acid (FOLVITE) 1 MG tablet, Take 1 tablet by mouth Daily. Start at least 7 days prior to chemotherapy until at least 3 weeks after all chemotherapy., Disp: 30 tablet, Rfl: 5  •  guaiFENesin (MUCINEX) 600 MG 12 hr tablet, Take 1 tablet by mouth 2 (Two) Times a Day., Disp: 60 tablet, Rfl: 5  •   "HYDROcodone-acetaminophen (NORCO) 7.5-325 MG per tablet, Take 1 tablet by mouth every 6 (six) hours as needed for moderate pain (4-6)., Disp: , Rfl:   •  INCRUSE ELLIPTA 62.5 MCG/INH aerosol powder , Inhale 1 puff Daily., Disp: , Rfl:   •  lactulose (CHRONULAC) 10 GM/15ML solution, Take 30 mL by mouth 3 (Three) Times a Day. PRN constipation, Disp: 240 mL, Rfl: 2  •  levocetirizine (XYZAL) 5 MG tablet, Take 5 mg by mouth Every Evening., Disp: , Rfl:   •  levothyroxine (SYNTHROID, LEVOTHROID) 75 MCG tablet, Take 75 mcg by mouth Daily., Disp: , Rfl:   •  omeprazole (priLOSEC) 40 MG capsule, Take 40 mg by mouth Daily., Disp: , Rfl:   •  ondansetron (ZOFRAN) 8 MG tablet, Take 1 tablet by mouth 3 (Three) Times a Day As Needed for Nausea or Vomiting., Disp: 30 tablet, Rfl: 5  •  promethazine (PHENERGAN) 25 MG tablet, , Disp: , Rfl:   •  SYMBICORT 160-4.5 MCG/ACT inhaler, INL 2 PUFFS PO BID, Disp: , Rfl: 3  •  tamsulosin (FLOMAX) 0.4 MG capsule 24 hr capsule, Take 1 capsule by mouth Daily. Pt will need to contact PCP for further refills., Disp: 30 capsule, Rfl: 0    PHYSICAL EXAMINATION:   /80   Pulse 74   Temp 98.2 °F (36.8 °C) (Infrared)   Resp 16   Ht 164.6 cm (64.8\")   Wt 79.5 kg (175 lb 3.2 oz)   SpO2 98%   BMI 29.33 kg/m²    Pain Score    08/21/20 0853   PainSc: 0-No pain      ECOG Performance Status: 1 - Symptomatic but completely ambulatory  General Appearance:  alert, cooperative, no apparent distress and appears stated age   Neurologic/Psychiatric: A&O x 3, gait steady, appropriate affect, strength 5/5 in all muscle groups   HEENT:  Normocephalic, without obvious abnormality, mucous membranes moist   Neck: Supple, symmetrical, trachea midline, no adenopathy;  No thyromegaly, masses, or tenderness   Lungs:   Scattered expiratory wheezes throughout; respirations regular, even, and unlabored bilaterally   Heart:  Regular rate and rhythm, no murmurs appreciated   Abdomen:   Soft, non-tender, non-distended " and no organomegaly   Lymph nodes: No cervical, supraclavicular, inguinal or axillary adenopathy noted   Extremities: Normal, atraumatic; no clubbing, cyanosis, or edema    Skin: No rashes, ulcers, or suspicious lesions noted     Hospital Outpatient Visit on 08/20/2020   Component Date Value Ref Range Status   • Glucose 08/20/2020 97  70 - 130 mg/dL Final        Nm Pet Skull Base To Mid Thigh    Result Date: 8/20/2020  Narrative: EXAMINATION: NM PET, SKULL BASE TO MID THIGH-08/20/2020:  INDICATION: F/U scan; C34.32-Malignant neoplasm of lower lobe, left bronchus or lung; C34.12-Malignant neoplasm of upper lobe, left bronchus or lung.  TECHNIQUE: With fasting blood glucose level of 97 mg/dl, total of 13.0 mCi of FDG was administered via right forearm vein. Following appropriate delay, PET and CT images were obtained from the level of the skull vertex to the mid thighs and fused multiplanar images reconstructed. The CT scan is an unenhanced low-dose study for reference to the PET scan only and does not constitute a standard diagnostic CT scan.  The radiation dose reduction device was turned on as low as reasonably achievable for each scan per ALARA protocol.  COMPARISON: 05/18/2020 whole-body PET scan.  FINDINGS: Previous exam report indicated new right supraclavicular lymph nodes, increasing left upper lobe masslike density. By history, the patient had previous diagnosis of adenocarcinoma of the left upper lobe.  3-D images show little overall change in appearance of the patient's dominant left upper lung, mediastinal and bilateral pulmonary parenchymal activity. Images today are somewhat more sharply defined, perhaps due to less patient motion. No clearly new focus of disease is identified. Some normal variant activity is seen in the arm and hand musculature.  Multiplanar images show no significant asymmetry of uptake in the brain. No hypermetabolic cervical node or mass is seen.  In the chest, activity in the right  neck appears to localize to a small subclavicular node, maximal SUV 3.2 today and 2.8 on the prior study, little overall change. The patient's dominant left upper lung scar/mass has maximal SUV of 5.9, previously 5.8 essentially stable. There are scattered other foci in the mediastinum and pulmonary parenchyma which are relatively low level, activity in the AP window decreased from 3.4 to 3.3, activity in the superior segment of the right lower lobe, within a small rounded nodule unchanged at 2.5. Activity in a small lateral left mid lung nodule decreased from 2.2 to 1.8. Pleural-based nodule in the medial left lower lobe, which appears to contain a fiducial, remains nonhypermetabolic, maximal SUV 1.6.  Below the diaphragm, no hypermetabolic node or mass is seen in the liver. Adrenal glands are nonhypermetabolic. No hypermetabolic solid organ disease or adenopathy is seen. There is expected GI and  tract uptake. No pathologic marrow space uptake is seen.  Review of the CT scan shows no obviously enlarging or new mass. Interstitial changes in left upper lung appear improved. Nodular density in the medial left base appears a little smaller today.      Impression: Minor changes in activity in the patient's mediastinal nodes within expected limits for normal differences between scans. The patient's medial left lower lung nodule remains nonhypermetabolic. No obvious progression of disease or new disease is identified.  D:  08/20/2020 E:  08/20/2020       (  ASSESSMENT: The patient is a very pleasant 64 y.o. with metastatic adenocarcinoma of the lung    PROBLEM LIST:  1. Non-small cell lung cancer originally diagnosed in 2009 status post right upper lobectomy done by Dr. Real in 2009.  2. Left upper lobe lung nodules found on surveillance CT in 2012, status post left upper lobe wedge resection with benign pathology.  3.  Left lung cancer, adenocarcinoma:  A.  Presented as a new left lower lobe lung nodule found on  chest x-ray done 6/2016.   B. CT scan of the chest done 6/16/2016 showing interval enlargement of left lung nodules. PET CT showing hypermetabolic activity in the left lower lobe nodule.   C. CT guided biopsy of left lower lobe nodule positive for adenocarcinoma of the lung. [id number], stage Ia.  C. Status post CyberKnife therapy completed 9/29/2016 per Dr. Lesia CHÁVEZ.  Repeated PET scan done on October 2019 revealed increase in size as well as hypermetabolic activity left upper lobe lung nodule with a new hypermetabolic active right supraclavicular lymph node and essentially other stable findings.  E.  Status post bronchoscopy with biopsy done by Dr. You November 6, 2019 revealed adenocarcinoma from level 4 left and suspicious cytology from left upper lobe.  F.  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  No enough tissue to do PDL 1 testing.  G.  Started carbo Alimta and Keytruda December 04, 2019, status post 4 cycle  H. Started maintenance Alimta/Keytruda on 2/28/2020 status post 8 cycles.   4.  Hemoptysis:  A.  Status post venous embolization done by Dr. Camp November 7, 2019  B.  Completed by radiation to left upper lobe mass November 26, 2019  4.  Black lung  5. Sarcoidosis  6. Hypothyroidism  7.  Conjunctivitis  8.  Treatment induced nausea    PLAN:  1. We will proceed today using Alimta/Keytruda cycle #9 as planned.   2. We will see the patient back in 3 weeks for maintenance regimen using Alimta/Keytruda cycle #10.    3. We will continue to monitor the patient's labs throughout treatment including blood counts, kidney function, thyroid function, and liver functions.   4.  We reviewed again the potential side effects of immunotherapy including but not limited to immune mediated reactions with thyroiditis, pneumonitis, hepatitis, colitis, rash, and electrolytes abnormalities, fatigue, multiorgan failure, and possibly death.  5. We reviewed again the potential side  effects of this regimen including fatigue, vomiting and nausea, hair loss, nephropathy, neuropathy, hearing loss, myelosuppression, and risk of infusion reaction.  6.  I will go over the scan results with the patient and reviewed the films myself and went over the pictures with him and compare the current PET scan to previous study.  Everything essentially stable.  I will do 3 months follow-up PET imaging which will be due November 2020.  7.  The patient can continue Mucinex 600 mg take by mouth twice per day as needed for congestion and cough.   8. He will continue levothyroxine 75 mcg for hypothyroidism. We will adjust his dose if needed for fluctuations in TSH.   9.  We will continue Zofran as needed for chemotherapy-induced nausea.  10.  We will continue folic acid, vitamin B12, and Decadron per Alimta protocol.  11. The patient will continue Miralax, Senakot, and Lactulose for constipation.   12.  He will continue saline eye drops for conjunctivitis.    Padmaja Denny MD  8/21/2020

## 2020-08-26 LAB
CREAT BLDA-MCNC: 0.9 MG/DL (ref 0.6–1.3)
CREAT BLDA-MCNC: 1.1 MG/DL (ref 0.6–1.3)

## 2020-09-11 ENCOUNTER — OFFICE VISIT (OUTPATIENT)
Dept: ONCOLOGY | Facility: CLINIC | Age: 65
End: 2020-09-11

## 2020-09-11 ENCOUNTER — HOSPITAL ENCOUNTER (OUTPATIENT)
Dept: ONCOLOGY | Facility: HOSPITAL | Age: 65
Setting detail: INFUSION SERIES
Discharge: HOME | End: 2020-09-11

## 2020-09-11 VITALS
HEIGHT: 65 IN | HEART RATE: 65 BPM | DIASTOLIC BLOOD PRESSURE: 68 MMHG | WEIGHT: 177 LBS | TEMPERATURE: 98 F | RESPIRATION RATE: 18 BRPM | BODY MASS INDEX: 29.49 KG/M2 | SYSTOLIC BLOOD PRESSURE: 149 MMHG | OXYGEN SATURATION: 93 %

## 2020-09-11 DIAGNOSIS — C34.12 MALIGNANT NEOPLASM OF UPPER LOBE OF LEFT LUNG (HCC): Primary | ICD-10-CM

## 2020-09-11 DIAGNOSIS — C34.12 MALIGNANT NEOPLASM OF UPPER LOBE OF LEFT LUNG (HCC): ICD-10-CM

## 2020-09-11 DIAGNOSIS — C34.32 MALIGNANT NEOPLASM OF LOWER LOBE OF LEFT LUNG (HCC): Primary | ICD-10-CM

## 2020-09-11 LAB
ALBUMIN SERPL-MCNC: 4.2 G/DL (ref 3.5–5.2)
ALBUMIN/GLOB SERPL: 1.4 G/DL
ALP SERPL-CCNC: 76 U/L (ref 39–117)
ALT SERPL W P-5'-P-CCNC: 14 U/L (ref 1–41)
ANION GAP SERPL CALCULATED.3IONS-SCNC: 10 MMOL/L (ref 5–15)
AST SERPL-CCNC: 22 U/L (ref 1–40)
BILIRUB SERPL-MCNC: 0.2 MG/DL (ref 0–1.2)
BUN SERPL-MCNC: 15 MG/DL (ref 8–23)
BUN/CREAT SERPL: 13.2 (ref 7–25)
CALCIUM SPEC-SCNC: 9.4 MG/DL (ref 8.6–10.5)
CHLORIDE SERPL-SCNC: 104 MMOL/L (ref 98–107)
CO2 SERPL-SCNC: 23 MMOL/L (ref 22–29)
CREAT BLDA-MCNC: 1.1 MG/DL (ref 0.6–1.3)
CREAT SERPL-MCNC: 1.1 MG/DL
CREAT SERPL-MCNC: 1.14 MG/DL (ref 0.76–1.27)
ERYTHROCYTE [DISTWIDTH] IN BLOOD BY AUTOMATED COUNT: 16.6 % (ref 12.3–15.4)
GFR SERPL CREATININE-BSD FRML MDRD: 64 ML/MIN/1.73
GLOBULIN UR ELPH-MCNC: 3.1 GM/DL
GLUCOSE SERPL-MCNC: 121 MG/DL (ref 65–99)
HCT VFR BLD AUTO: 35.2 % (ref 37.5–51)
HGB BLD-MCNC: 11.6 G/DL (ref 13–17.7)
LYMPHOCYTES # BLD AUTO: 0.5 10*3/MM3 (ref 0.7–3.1)
LYMPHOCYTES NFR BLD AUTO: 7.3 % (ref 19.6–45.3)
MCH RBC QN AUTO: 30.4 PG (ref 26.6–33)
MCHC RBC AUTO-ENTMCNC: 33 G/DL (ref 31.5–35.7)
MCV RBC AUTO: 92.2 FL (ref 79–97)
MONOCYTES # BLD AUTO: 0.3 10*3/MM3 (ref 0.1–0.9)
MONOCYTES NFR BLD AUTO: 3.8 % (ref 5–12)
NEUTROPHILS NFR BLD AUTO: 6.6 10*3/MM3 (ref 1.7–7)
NEUTROPHILS NFR BLD AUTO: 88.9 % (ref 42.7–76)
PLATELET # BLD AUTO: 303 10*3/MM3 (ref 140–450)
PMV BLD AUTO: 6.8 FL (ref 6–12)
POTASSIUM SERPL-SCNC: 3.9 MMOL/L (ref 3.5–5.2)
PROT SERPL-MCNC: 7.3 G/DL (ref 6–8.5)
RBC # BLD AUTO: 3.82 10*6/MM3 (ref 4.14–5.8)
SODIUM SERPL-SCNC: 137 MMOL/L (ref 136–145)
WBC # BLD AUTO: 7.4 10*3/MM3 (ref 3.4–10.8)

## 2020-09-11 PROCEDURE — 25010000002 PEMETREXED 100 MG RECONSTITUTED SOLUTION 100 MG VIAL: Performed by: NURSE PRACTITIONER

## 2020-09-11 PROCEDURE — 82565 ASSAY OF CREATININE: CPT

## 2020-09-11 PROCEDURE — 80053 COMPREHEN METABOLIC PANEL: CPT | Performed by: NURSE PRACTITIONER

## 2020-09-11 PROCEDURE — 96413 CHEMO IV INFUSION 1 HR: CPT

## 2020-09-11 PROCEDURE — 25010000002 PEMETREXED PER 10 MG: Performed by: NURSE PRACTITIONER

## 2020-09-11 PROCEDURE — 96411 CHEMO IV PUSH ADDL DRUG: CPT

## 2020-09-11 PROCEDURE — 85025 COMPLETE CBC W/AUTO DIFF WBC: CPT | Performed by: NURSE PRACTITIONER

## 2020-09-11 PROCEDURE — 25010000002 PEMBROLIZUMAB 100 MG/4ML SOLUTION 4 ML VIAL: Performed by: NURSE PRACTITIONER

## 2020-09-11 PROCEDURE — 99214 OFFICE O/P EST MOD 30 MIN: CPT | Performed by: NURSE PRACTITIONER

## 2020-09-11 RX ORDER — CYANOCOBALAMIN 1000 UG/ML
1000 INJECTION, SOLUTION INTRAMUSCULAR; SUBCUTANEOUS ONCE
Status: CANCELLED | OUTPATIENT
Start: 2020-10-02

## 2020-09-11 RX ORDER — SODIUM CHLORIDE 9 MG/ML
250 INJECTION, SOLUTION INTRAVENOUS ONCE
Status: CANCELLED | OUTPATIENT
Start: 2020-09-11

## 2020-09-11 RX ORDER — SODIUM CHLORIDE 9 MG/ML
250 INJECTION, SOLUTION INTRAVENOUS ONCE
Status: CANCELLED | OUTPATIENT
Start: 2020-10-02

## 2020-09-11 RX ORDER — SODIUM CHLORIDE 9 MG/ML
250 INJECTION, SOLUTION INTRAVENOUS ONCE
Status: DISCONTINUED | OUTPATIENT
Start: 2020-09-11 | End: 2020-09-12 | Stop reason: HOSPADM

## 2020-09-11 RX ADMIN — SODIUM CHLORIDE 900 MG: 9 INJECTION, SOLUTION INTRAVENOUS at 12:31

## 2020-09-11 RX ADMIN — SODIUM CHLORIDE 200 MG: 9 INJECTION, SOLUTION INTRAVENOUS at 11:55

## 2020-09-11 NOTE — PROGRESS NOTES
DATE OF VISIT: 9/11/2020    REASON FOR VISIT: Recurrent adenocarcinoma of the lung       HISTORY OF PRESENT ILLNESS: The patient is a very pleasant 64 y.o. male  with past medical history significant for lung cancer diagnosed 6/22/2016 . 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 PET scan done 6/22/2016 showed findings consistent with recurrent neoplasm in the left upper lobe, with metastatic adenopathy to the left hilum and AP window as well as a metastatic nodule in the left lower lobe. CT-guided biopsy of the left lower lobe nodule was performed that showed adenocarcinoma of the lung. The patient underwent CyberKnife therapy by Dr Pinzon completed 9/29/2016.  Repeat scan showed gradual increase in size of left upper lobe lung infiltrates.  Patient had whole-body PET scan that revealed increased metabolic activity in the left lung infiltrate as well as a new right supraclavicular lymph nodes.  Patient had bronchoscopy with biopsy done by Dr. You on November 6, 2019 that revealed adenocarcinoma and level 4 left mediastinal lymph node.  He received palliative course of radiation to the left upper lobe secondary to persistent hemoptysis followed by palliative treatment with carboplatin Alimta and Keytruda started December 4, 2019.  Completed 4 cycles on February 7, 2020.  The patient was started on Alimta with Keytruda on February 28, 2020.  The patient is here today for scheduled follow up visit with treatment cycle #10.     SUBJECTIVE: The patient is here today by himself. He has been doing fairly well. He has had more allergy symptoms after working outside with eye itching, tearing, and runny nose. He has been using over the counter eye drops as well as allergy medication that has helped some. He still has some cough and shortness of breath, however it is not progressive and he denies hemoptysis. He continues to be very  active.     PAST MEDICAL HISTORY/SOCIAL HISTORY/FAMILY HISTORY: Unchanged from my prior documentation done on 08/02/2016.     Review of Systems   Constitutional: Negative for activity change, appetite change, chills, fatigue, fever and unexpected weight change.   HENT: Positive for rhinorrhea and sneezing. Negative for hearing loss, mouth sores, nosebleeds, sore throat and trouble swallowing.    Eyes: Positive for itching. Negative for visual disturbance.        Tearing   Respiratory: Positive for cough and shortness of breath. Negative for chest tightness and wheezing.         With activity   Cardiovascular: Negative for chest pain, palpitations and leg swelling.   Gastrointestinal: Negative for abdominal distention, abdominal pain, blood in stool, constipation, diarrhea, nausea, rectal pain and vomiting.   Endocrine: Negative for cold intolerance and heat intolerance.   Genitourinary: Negative for difficulty urinating, dysuria, frequency and urgency.   Musculoskeletal: Negative for arthralgias, back pain, gait problem, joint swelling and myalgias.   Skin: Negative for rash.   Neurological: Negative for dizziness, tremors, syncope, weakness, light-headedness, numbness and headaches.   Hematological: Negative for adenopathy. Does not bruise/bleed easily.   Psychiatric/Behavioral: Negative for confusion, sleep disturbance and suicidal ideas. The patient is not nervous/anxious.          Current Outpatient Medications:   •  albuterol (PROVENTIL HFA;VENTOLIN HFA) 108 (90 BASE) MCG/ACT inhaler, Inhale 2 puffs every 4 (four) hours as needed for wheezing or shortness of air., Disp: , Rfl:   •  dexamethasone (DECADRON) 4 MG tablet, Take 1 tablet twice daily starting the day before chemo, day of chemo, and day after chemo.  Take with food., Disp: 30 tablet, Rfl: 3  •  dexamethasone (DECADRON) 4 MG tablet, Take 1 tablet twice daily starting the day before chemo, day of chemo, and day after chemo.  Take with food., Disp: 30  "tablet, Rfl: 5  •  folic acid (FOLVITE) 1 MG tablet, Take 1 tablet by mouth Daily. Start at least 7 days prior to chemotherapy until at least 3 weeks after all chemotherapy., Disp: 30 tablet, Rfl: 5  •  guaiFENesin (MUCINEX) 600 MG 12 hr tablet, Take 1 tablet by mouth 2 (Two) Times a Day., Disp: 60 tablet, Rfl: 5  •  HYDROcodone-acetaminophen (NORCO) 7.5-325 MG per tablet, Take 1 tablet by mouth every 6 (six) hours as needed for moderate pain (4-6)., Disp: , Rfl:   •  INCRUSE ELLIPTA 62.5 MCG/INH aerosol powder , Inhale 1 puff Daily., Disp: , Rfl:   •  lactulose (CHRONULAC) 10 GM/15ML solution, Take 30 mL by mouth 3 (Three) Times a Day. PRN constipation, Disp: 240 mL, Rfl: 2  •  levocetirizine (XYZAL) 5 MG tablet, Take 5 mg by mouth Every Evening., Disp: , Rfl:   •  levothyroxine (SYNTHROID, LEVOTHROID) 75 MCG tablet, Take 75 mcg by mouth Daily., Disp: , Rfl:   •  omeprazole (priLOSEC) 40 MG capsule, Take 40 mg by mouth Daily., Disp: , Rfl:   •  ondansetron (ZOFRAN) 8 MG tablet, Take 1 tablet by mouth 3 (Three) Times a Day As Needed for Nausea or Vomiting., Disp: 30 tablet, Rfl: 5  •  promethazine (PHENERGAN) 25 MG tablet, , Disp: , Rfl:   •  SYMBICORT 160-4.5 MCG/ACT inhaler, INL 2 PUFFS PO BID, Disp: , Rfl: 3  •  tamsulosin (FLOMAX) 0.4 MG capsule 24 hr capsule, Take 1 capsule by mouth Daily. Pt will need to contact PCP for further refills., Disp: 30 capsule, Rfl: 0    PHYSICAL EXAMINATION:   /68   Pulse 65   Temp 98 °F (36.7 °C) (Temporal)   Resp 18   Ht 164.6 cm (64.8\")   Wt 80.3 kg (177 lb)   SpO2 93%   BMI 29.63 kg/m²    Pain Score    09/11/20 1019   PainSc: 0-No pain      ECOG Performance Status: 1 - Symptomatic but completely ambulatory  General Appearance:  alert, cooperative, no apparent distress and appears stated age   Neurologic/Psychiatric: A&O x 3, gait steady, appropriate affect, strength 5/5 in all muscle groups   HEENT:  Normocephalic, without obvious abnormality, mucous membranes " moist   Neck: Supple, symmetrical, trachea midline, no adenopathy;  No thyromegaly, masses, or tenderness   Lungs:   Scattered expiratory wheezes throughout; respirations regular, even, and unlabored bilaterally   Heart:  Regular rate and rhythm, no murmurs appreciated   Abdomen:   Soft, non-tender, non-distended and no organomegaly   Lymph nodes: No cervical, supraclavicular, inguinal or axillary adenopathy noted   Extremities: Normal, atraumatic; no clubbing, cyanosis, or edema    Skin: No rashes, ulcers, or suspicious lesions noted     No visits with results within 2 Week(s) from this visit.   Latest known visit with results is:   Hospital Outpatient Visit on 08/21/2020   Component Date Value Ref Range Status   • Glucose 08/21/2020 118* 65 - 99 mg/dL Final   • BUN 08/21/2020 11  8 - 23 mg/dL Final   • Creatinine 08/21/2020 1.19  0.76 - 1.27 mg/dL Final   • Sodium 08/21/2020 141  136 - 145 mmol/L Final   • Potassium 08/21/2020 3.3* 3.5 - 5.2 mmol/L Final    Specimen hemolyzed.  Results may be affected.   • Chloride 08/21/2020 104  98 - 107 mmol/L Final   • CO2 08/21/2020 25.0  22.0 - 29.0 mmol/L Final   • Calcium 08/21/2020 9.3  8.6 - 10.5 mg/dL Final   • Total Protein 08/21/2020 7.8  6.0 - 8.5 g/dL Final   • Albumin 08/21/2020 4.60  3.50 - 5.20 g/dL Final   • ALT (SGPT) 08/21/2020 13  1 - 41 U/L Final   • AST (SGOT) 08/21/2020 26  1 - 40 U/L Final   • Alkaline Phosphatase 08/21/2020 83  39 - 117 U/L Final   • Total Bilirubin 08/21/2020 0.3  0.0 - 1.2 mg/dL Final   • eGFR Non African Amer 08/21/2020 61  >60 mL/min/1.73 Final   • Globulin 08/21/2020 3.2  gm/dL Final   • A/G Ratio 08/21/2020 1.4  g/dL Final   • BUN/Creatinine Ratio 08/21/2020 9.2  7.0 - 25.0 Final   • Anion Gap 08/21/2020 12.0  5.0 - 15.0 mmol/L Final   • TSH 08/21/2020 1.150  0.270 - 4.200 uIU/mL Final   • Free T4 08/21/2020 1.24  0.93 - 1.70 ng/dL Final   • WBC 08/21/2020 7.70  3.40 - 10.80 10*3/mm3 Final   • RBC 08/21/2020 4.44  4.14 - 5.80  10*6/mm3 Final   • Hemoglobin 08/21/2020 13.2  13.0 - 17.7 g/dL Final   • Hematocrit 08/21/2020 40.5  37.5 - 51.0 % Final   • RDW 08/21/2020 18.7* 12.3 - 15.4 % Final   • MCV 08/21/2020 91.3  79.0 - 97.0 fL Final   • MCH 08/21/2020 29.8  26.6 - 33.0 pg Final   • MCHC 08/21/2020 32.6  31.5 - 35.7 g/dL Final   • MPV 08/21/2020 7.0  6.0 - 12.0 fL Final   • Platelets 08/21/2020 312  140 - 450 10*3/mm3 Final   • Neutrophil % 08/21/2020 84.6* 42.7 - 76.0 % Final   • Lymphocyte % 08/21/2020 12.3* 19.6 - 45.3 % Final   • Monocyte % 08/21/2020 3.1* 5.0 - 12.0 % Final   • Neutrophils, Absolute 08/21/2020 6.50  1.70 - 7.00 10*3/mm3 Final   • Lymphocytes, Absolute 08/21/2020 0.90  0.70 - 3.10 10*3/mm3 Final   • Monocytes, Absolute 08/21/2020 0.20  0.10 - 0.90 10*3/mm3 Final   • Creatinine 08/21/2020 1.10  mg/dL Final    ISTAT   • Creatinine 08/21/2020 1.10  0.60 - 1.30 mg/dL Final    Serial Number: [id number]:  [id number]        Nm Pet Skull Base To Mid Thigh    Result Date: 8/22/2020  Narrative: EXAMINATION: NM PET, SKULL BASE TO MID THIGH-08/20/2020:  INDICATION: F/U scan; C34.32-Malignant neoplasm of lower lobe, left bronchus or lung; C34.12-Malignant neoplasm of upper lobe, left bronchus or lung.  TECHNIQUE: With fasting blood glucose level of 97 mg/dl, total of 13.0 mCi of FDG was administered via right forearm vein. Following appropriate delay, PET and CT images were obtained from the level of the skull vertex to the mid thighs and fused multiplanar images reconstructed. The CT scan is an unenhanced low-dose study for reference to the PET scan only and does not constitute a standard diagnostic CT scan.  The radiation dose reduction device was turned on as low as reasonably achievable for each scan per ALARA protocol.  COMPARISON: 05/18/2020 whole-body PET scan.  FINDINGS: Previous exam report indicated new right supraclavicular lymph nodes, increasing left upper lobe masslike density. By history, the patient had previous  diagnosis of adenocarcinoma of the left upper lobe.  3-D images show little overall change in appearance of the patient's dominant left upper lung, mediastinal and bilateral pulmonary parenchymal activity. Images today are somewhat more sharply defined, perhaps due to less patient motion. No clearly new focus of disease is identified. Some normal variant activity is seen in the arm and hand musculature.  Multiplanar images show no significant asymmetry of uptake in the brain. No hypermetabolic cervical node or mass is seen.  In the chest, activity in the right neck appears to localize to a small subclavicular node, maximal SUV 3.2 today and 2.8 on the prior study, little overall changed. The patient's dominant left upper lung scar/mass has maximal SUV of 5.9, previously 5.8 essentially stable. There are scattered other foci in the mediastinum and pulmonary parenchyma which are relatively low level, activity in the AP window decreased from 3.4 to 3.3, activity in the superior segment of the right lower lobe, within a small rounded nodule unchanged at 2.5. Activity in a small lateral left mid lung nodule decreased from 2.2 to 1.8. Pleural-based nodule in the medial left lower lobe, which appears to contain a fiducial, remains nonhypermetabolic, maximal SUV 1.6.  Below the diaphragm, no hypermetabolic node or mass is seen in the liver. Adrenal glands are nonhypermetabolic. No hypermetabolic solid organ disease or adenopathy is seen. There is expected GI and  tract uptake. No pathologic marrow space uptake is seen.  Review of the CT scan shows no obviously enlarging or new mass. Interstitial changes in left upper lung appear improved. Nodular density in the medial left base appears a little smaller today.      Impression: Minor changes in activity in the patient's mediastinal nodes within expected limits for normal differences between scans. The patient's medial left lower lung nodule remains nonhypermetabolic. No  obvious progression of disease or new disease is identified.  D:  08/20/2020 E:  08/20/2020    This report was finalized on 8/22/2020 6:37 PM by Dr. Curtis Lyons MD.    (  ASSESSMENT: The patient is a very pleasant 64 y.o. with metastatic adenocarcinoma of the lung    PROBLEM LIST:  1. Non-small cell lung cancer originally diagnosed in 2009 status post right upper lobectomy done by Dr. Real in 2009.  2. Left upper lobe lung nodules found on surveillance CT in 2012, status post left upper lobe wedge resection with benign pathology.  3.  Left lung cancer, adenocarcinoma:  A.  Presented as a new left lower lobe lung nodule found on chest x-ray done 6/2016.   B. CT scan of the chest done 6/16/2016 showing interval enlargement of left lung nodules. PET CT showing hypermetabolic activity in the left lower lobe nodule.   C. CT guided biopsy of left lower lobe nodule positive for adenocarcinoma of the lung. [id number], stage Ia.  C. Status post CyberKnife therapy completed 9/29/2016 per Dr. Lesia CHÁVEZ.  Repeated PET scan done on October 2019 revealed increase in size as well as hypermetabolic activity left upper lobe lung nodule with a new hypermetabolic active right supraclavicular lymph node and essentially other stable findings.  E.  Status post bronchoscopy with biopsy done by Dr. You November 6, 2019 revealed adenocarcinoma from level 4 left and suspicious cytology from left upper lobe.  F.  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  No enough tissue to do PDL 1 testing.  G.  Started carbo Alimta and Keytruda December 04, 2019, status post 4 cycle  H. Started maintenance Alimta/Keytruda on 2/28/2020 status post 9 cycles.   4.  Hemoptysis:  A.  Status post venous embolization done by Dr. Camp November 7, 2019  B.  Completed by radiation to left upper lobe mass November 26, 2019  4.  Black lung  5. Sarcoidosis  6. Hypothyroidism  7.  Conjunctivitis  8.  Treatment induced  nausea  9. Seasonal allergies    PLAN:  1. We will proceed today using Alimta/Keytruda cycle #10 as planned.   2. We will see the patient back in 3 weeks for maintenance regimen using Alimta/Keytruda cycle #11.    3. We will continue to monitor the patient's labs throughout treatment including blood counts, kidney function, thyroid function, and liver functions.   4.  We reviewed again the potential side effects of immunotherapy including but not limited to immune mediated reactions with thyroiditis, pneumonitis, hepatitis, colitis, rash, and electrolytes abnormalities, fatigue, multiorgan failure, and possibly death.  5. We reviewed again the potential side effects of this regimen including fatigue, vomiting and nausea, hair loss, nephropathy, neuropathy, hearing loss, myelosuppression, and risk of infusion reaction.  6.  We will do 3 months follow-up PET imaging which will be due November 2020.  7.  The patient will continue Mucinex 600 mg take by mouth twice per day as needed for congestion and cough.   8. He will continue levothyroxine 75 mcg for hypothyroidism. We will adjust his dose if needed for fluctuations in TSH.   9.  We will continue Zofran as needed for chemotherapy-induced nausea.  10.  We will continue folic acid, vitamin B12, and Decadron per Alimta protocol.  11. The patient will continue Miralax, Senakot, and Lactulose for constipation.   12.  He will continue saline eye drops as well as Pataday drops for conjunctivitis.  13. He will continue use of over the counter allergy medication for seasonal allergies.     Snehal Stallworth, APRN  9/11/2020

## 2020-10-02 ENCOUNTER — OFFICE VISIT (OUTPATIENT)
Dept: ONCOLOGY | Facility: CLINIC | Age: 65
End: 2020-10-02

## 2020-10-02 ENCOUNTER — HOSPITAL ENCOUNTER (OUTPATIENT)
Dept: ONCOLOGY | Facility: HOSPITAL | Age: 65
Setting detail: INFUSION SERIES
Discharge: HOME | End: 2020-10-02

## 2020-10-02 VITALS
WEIGHT: 176 LBS | HEIGHT: 65 IN | SYSTOLIC BLOOD PRESSURE: 163 MMHG | BODY MASS INDEX: 29.32 KG/M2 | HEART RATE: 70 BPM | TEMPERATURE: 97.8 F | OXYGEN SATURATION: 94 % | RESPIRATION RATE: 18 BRPM | DIASTOLIC BLOOD PRESSURE: 74 MMHG

## 2020-10-02 DIAGNOSIS — C34.12 MALIGNANT NEOPLASM OF UPPER LOBE OF LEFT LUNG (HCC): Primary | ICD-10-CM

## 2020-10-02 DIAGNOSIS — C34.32 MALIGNANT NEOPLASM OF LOWER LOBE OF LEFT LUNG (HCC): Primary | ICD-10-CM

## 2020-10-02 LAB
ALBUMIN SERPL-MCNC: 4.5 G/DL (ref 3.5–5.2)
ALBUMIN/GLOB SERPL: 1.8 G/DL
ALP SERPL-CCNC: 94 U/L (ref 39–117)
ALT SERPL W P-5'-P-CCNC: 16 U/L (ref 1–41)
ANION GAP SERPL CALCULATED.3IONS-SCNC: 14 MMOL/L (ref 5–15)
AST SERPL-CCNC: 27 U/L (ref 1–40)
BILIRUB SERPL-MCNC: 0.3 MG/DL (ref 0–1.2)
BUN SERPL-MCNC: 14 MG/DL (ref 8–23)
BUN/CREAT SERPL: 11.8 (ref 7–25)
CALCIUM SPEC-SCNC: 9.3 MG/DL (ref 8.6–10.5)
CHLORIDE SERPL-SCNC: 101 MMOL/L (ref 98–107)
CO2 SERPL-SCNC: 23 MMOL/L (ref 22–29)
CREAT BLDA-MCNC: 1.2 MG/DL (ref 0.6–1.3)
CREAT SERPL-MCNC: 1.19 MG/DL (ref 0.76–1.27)
ERYTHROCYTE [DISTWIDTH] IN BLOOD BY AUTOMATED COUNT: 16.8 % (ref 12.3–15.4)
GFR SERPL CREATININE-BSD FRML MDRD: 61 ML/MIN/1.73
GLOBULIN UR ELPH-MCNC: 2.5 GM/DL
GLUCOSE SERPL-MCNC: 132 MG/DL (ref 65–99)
HCT VFR BLD AUTO: 40.6 % (ref 37.5–51)
HGB BLD-MCNC: 13.2 G/DL (ref 13–17.7)
LYMPHOCYTES # BLD AUTO: 0.4 10*3/MM3 (ref 0.7–3.1)
LYMPHOCYTES NFR BLD AUTO: 6.4 % (ref 19.6–45.3)
MCH RBC QN AUTO: 30.4 PG (ref 26.6–33)
MCHC RBC AUTO-ENTMCNC: 32.5 G/DL (ref 31.5–35.7)
MCV RBC AUTO: 93.6 FL (ref 79–97)
MONOCYTES # BLD AUTO: 0.3 10*3/MM3 (ref 0.1–0.9)
MONOCYTES NFR BLD AUTO: 5.8 % (ref 5–12)
NEUTROPHILS NFR BLD AUTO: 5.3 10*3/MM3 (ref 1.7–7)
NEUTROPHILS NFR BLD AUTO: 87.8 % (ref 42.7–76)
PLATELET # BLD AUTO: 329 10*3/MM3 (ref 140–450)
PMV BLD AUTO: 7 FL (ref 6–12)
POTASSIUM SERPL-SCNC: 3.8 MMOL/L (ref 3.5–5.2)
PROT SERPL-MCNC: 7 G/DL (ref 6–8.5)
RBC # BLD AUTO: 4.34 10*6/MM3 (ref 4.14–5.8)
SODIUM SERPL-SCNC: 138 MMOL/L (ref 136–145)
T4 FREE SERPL-MCNC: 1.28 NG/DL (ref 0.93–1.7)
TSH SERPL DL<=0.05 MIU/L-ACNC: 0.87 UIU/ML (ref 0.27–4.2)
WBC # BLD AUTO: 6 10*3/MM3 (ref 3.4–10.8)

## 2020-10-02 PROCEDURE — 85025 COMPLETE CBC W/AUTO DIFF WBC: CPT | Performed by: NURSE PRACTITIONER

## 2020-10-02 PROCEDURE — 25010000002 PEMETREXED 100 MG RECONSTITUTED SOLUTION 100 MG VIAL: Performed by: NURSE PRACTITIONER

## 2020-10-02 PROCEDURE — 80053 COMPREHEN METABOLIC PANEL: CPT | Performed by: NURSE PRACTITIONER

## 2020-10-02 PROCEDURE — 25010000002 PEMBROLIZUMAB 100 MG/4ML SOLUTION 4 ML VIAL: Performed by: NURSE PRACTITIONER

## 2020-10-02 PROCEDURE — 96372 THER/PROPH/DIAG INJ SC/IM: CPT

## 2020-10-02 PROCEDURE — 25010000002 CYANOCOBALAMIN PER 1000 MCG: Performed by: NURSE PRACTITIONER

## 2020-10-02 PROCEDURE — 25010000002 PEMETREXED PER 10 MG: Performed by: NURSE PRACTITIONER

## 2020-10-02 PROCEDURE — 96411 CHEMO IV PUSH ADDL DRUG: CPT

## 2020-10-02 PROCEDURE — 99214 OFFICE O/P EST MOD 30 MIN: CPT | Performed by: NURSE PRACTITIONER

## 2020-10-02 PROCEDURE — 82565 ASSAY OF CREATININE: CPT

## 2020-10-02 PROCEDURE — 84439 ASSAY OF FREE THYROXINE: CPT | Performed by: NURSE PRACTITIONER

## 2020-10-02 PROCEDURE — 84443 ASSAY THYROID STIM HORMONE: CPT | Performed by: NURSE PRACTITIONER

## 2020-10-02 PROCEDURE — 96413 CHEMO IV INFUSION 1 HR: CPT

## 2020-10-02 RX ORDER — SODIUM CHLORIDE 9 MG/ML
250 INJECTION, SOLUTION INTRAVENOUS ONCE
Status: COMPLETED | OUTPATIENT
Start: 2020-10-02 | End: 2020-10-02

## 2020-10-02 RX ORDER — CYANOCOBALAMIN 1000 UG/ML
1000 INJECTION, SOLUTION INTRAMUSCULAR; SUBCUTANEOUS ONCE
Status: COMPLETED | OUTPATIENT
Start: 2020-10-02 | End: 2020-10-02

## 2020-10-02 RX ADMIN — CYANOCOBALAMIN 1000 MCG: 1000 INJECTION, SOLUTION INTRAMUSCULAR; SUBCUTANEOUS at 10:26

## 2020-10-02 RX ADMIN — SODIUM CHLORIDE 900 MG: 9 INJECTION, SOLUTION INTRAVENOUS at 10:43

## 2020-10-02 RX ADMIN — SODIUM CHLORIDE 200 MG: 9 INJECTION, SOLUTION INTRAVENOUS at 10:02

## 2020-10-02 RX ADMIN — SODIUM CHLORIDE 250 ML: 9 INJECTION, SOLUTION INTRAVENOUS at 10:01

## 2020-10-02 NOTE — PROGRESS NOTES
DATE OF VISIT: 10/2/2020    REASON FOR VISIT: Recurrent adenocarcinoma of the lung       HISTORY OF PRESENT ILLNESS: The patient is a very pleasant 65 y.o. male  with past medical history significant for lung cancer diagnosed 6/22/2016 . 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 PET scan done 6/22/2016 showed findings consistent with recurrent neoplasm in the left upper lobe, with metastatic adenopathy to the left hilum and AP window as well as a metastatic nodule in the left lower lobe. CT-guided biopsy of the left lower lobe nodule was performed that showed adenocarcinoma of the lung. The patient underwent CyberKnife therapy by Dr Pinzon completed 9/29/2016.  Repeat scan showed gradual increase in size of left upper lobe lung infiltrates.  Patient had whole-body PET scan that revealed increased metabolic activity in the left lung infiltrate as well as a new right supraclavicular lymph nodes.  Patient had bronchoscopy with biopsy done by Dr. You on November 6, 2019 that revealed adenocarcinoma and level 4 left mediastinal lymph node.  He received palliative course of radiation to the left upper lobe secondary to persistent hemoptysis followed by palliative treatment with carboplatin Alimta and Keytruda started December 4, 2019.  Completed 4 cycles on February 7, 2020.  The patient was started on Alimta with Keytruda on February 28, 2020.  The patient is here today for scheduled follow up visit with treatment cycle #11.     SUBJECTIVE: The patient is here today by himself. He has been doing fairly well. He complains of having sore throat about a week after treatment. He has been using salt water rinses that helps some, but his discomfort was a little more this cycle. He does note some white changes to his tongue at times. He denies difficulty swallowing. Otherwise, he has has no nausea, vomiting, fever or chills. He is eating  well and continuing to stay active.     PAST MEDICAL HISTORY/SOCIAL HISTORY/FAMILY HISTORY: Unchanged from my prior documentation done on 08/02/2016.     Review of Systems   Constitutional: Negative for activity change, appetite change, chills, fatigue, fever and unexpected weight change.   HENT: Positive for sore throat. Negative for hearing loss, mouth sores, nosebleeds, rhinorrhea, sneezing and trouble swallowing.    Eyes: Negative for itching and visual disturbance.        Tearing   Respiratory: Positive for cough and shortness of breath. Negative for chest tightness and wheezing.         With activity   Cardiovascular: Negative for chest pain, palpitations and leg swelling.   Gastrointestinal: Negative for abdominal distention, abdominal pain, blood in stool, constipation, diarrhea, nausea, rectal pain and vomiting.   Endocrine: Negative for cold intolerance and heat intolerance.   Genitourinary: Negative for difficulty urinating, dysuria, frequency and urgency.   Musculoskeletal: Negative for arthralgias, back pain, gait problem, joint swelling and myalgias.   Skin: Negative for rash.   Neurological: Negative for dizziness, tremors, syncope, weakness, light-headedness, numbness and headaches.   Hematological: Negative for adenopathy. Does not bruise/bleed easily.   Psychiatric/Behavioral: Negative for confusion, sleep disturbance and suicidal ideas. The patient is not nervous/anxious.          Current Outpatient Medications:   •  albuterol (PROVENTIL HFA;VENTOLIN HFA) 108 (90 BASE) MCG/ACT inhaler, Inhale 2 puffs every 4 (four) hours as needed for wheezing or shortness of air., Disp: , Rfl:   •  dexamethasone (DECADRON) 4 MG tablet, Take 1 tablet twice daily starting the day before chemo, day of chemo, and day after chemo.  Take with food., Disp: 30 tablet, Rfl: 3  •  dexamethasone (DECADRON) 4 MG tablet, Take 1 tablet twice daily starting the day before chemo, day of chemo, and day after chemo.  Take with  "food., Disp: 30 tablet, Rfl: 5  •  folic acid (FOLVITE) 1 MG tablet, Take 1 tablet by mouth Daily. Start at least 7 days prior to chemotherapy until at least 3 weeks after all chemotherapy., Disp: 30 tablet, Rfl: 5  •  guaiFENesin (MUCINEX) 600 MG 12 hr tablet, Take 1 tablet by mouth 2 (Two) Times a Day., Disp: 60 tablet, Rfl: 5  •  HYDROcodone-acetaminophen (NORCO) 7.5-325 MG per tablet, Take 1 tablet by mouth every 6 (six) hours as needed for moderate pain (4-6)., Disp: , Rfl:   •  INCRUSE ELLIPTA 62.5 MCG/INH aerosol powder , Inhale 1 puff Daily., Disp: , Rfl:   •  lactulose (CHRONULAC) 10 GM/15ML solution, Take 30 mL by mouth 3 (Three) Times a Day. PRN constipation, Disp: 240 mL, Rfl: 2  •  levocetirizine (XYZAL) 5 MG tablet, Take 5 mg by mouth Every Evening., Disp: , Rfl:   •  levothyroxine (SYNTHROID, LEVOTHROID) 75 MCG tablet, Take 75 mcg by mouth Daily., Disp: , Rfl:   •  omeprazole (priLOSEC) 40 MG capsule, Take 40 mg by mouth Daily., Disp: , Rfl:   •  ondansetron (ZOFRAN) 8 MG tablet, Take 1 tablet by mouth 3 (Three) Times a Day As Needed for Nausea or Vomiting., Disp: 30 tablet, Rfl: 5  •  promethazine (PHENERGAN) 25 MG tablet, , Disp: , Rfl:   •  SYMBICORT 160-4.5 MCG/ACT inhaler, INL 2 PUFFS PO BID, Disp: , Rfl: 3  •  tamsulosin (FLOMAX) 0.4 MG capsule 24 hr capsule, Take 1 capsule by mouth Daily. Pt will need to contact PCP for further refills., Disp: 30 capsule, Rfl: 0    PHYSICAL EXAMINATION:   /74   Pulse 70   Temp 97.8 °F (36.6 °C) (Temporal)   Resp 18   Ht 164.6 cm (64.8\")   Wt 79.8 kg (176 lb)   SpO2 94%   BMI 29.47 kg/m²    Pain Score    10/02/20 0821   PainSc: 0-No pain      ECOG Performance Status: 1 - Symptomatic but completely ambulatory  General Appearance:  alert, cooperative, no apparent distress and appears stated age   Neurologic/Psychiatric: A&O x 3, gait steady, appropriate affect, strength 5/5 in all muscle groups   HEENT:  Normocephalic, without obvious abnormality, " mucous membranes moist   Neck: Supple, symmetrical, trachea midline, no adenopathy;  No thyromegaly, masses, or tenderness   Lungs:   Scattered expiratory wheezes throughout; respirations regular, even, and unlabored bilaterally   Heart:  Regular rate and rhythm, no murmurs appreciated   Abdomen:   Soft, non-tender, non-distended and no organomegaly   Lymph nodes: No cervical, supraclavicular, inguinal or axillary adenopathy noted   Extremities: Normal, atraumatic; no clubbing, cyanosis, or edema    Skin: No rashes, ulcers, or suspicious lesions noted     No visits with results within 2 Week(s) from this visit.   Latest known visit with results is:   Hospital Outpatient Visit on 09/11/2020   Component Date Value Ref Range Status   • Glucose 09/11/2020 121* 65 - 99 mg/dL Final   • BUN 09/11/2020 15  8 - 23 mg/dL Final   • Creatinine 09/11/2020 1.14  0.76 - 1.27 mg/dL Final   • Sodium 09/11/2020 137  136 - 145 mmol/L Final   • Potassium 09/11/2020 3.9  3.5 - 5.2 mmol/L Final   • Chloride 09/11/2020 104  98 - 107 mmol/L Final   • CO2 09/11/2020 23.0  22.0 - 29.0 mmol/L Final   • Calcium 09/11/2020 9.4  8.6 - 10.5 mg/dL Final   • Total Protein 09/11/2020 7.3  6.0 - 8.5 g/dL Final   • Albumin 09/11/2020 4.20  3.50 - 5.20 g/dL Final   • ALT (SGPT) 09/11/2020 14  1 - 41 U/L Final   • AST (SGOT) 09/11/2020 22  1 - 40 U/L Final   • Alkaline Phosphatase 09/11/2020 76  39 - 117 U/L Final   • Total Bilirubin 09/11/2020 0.2  0.0 - 1.2 mg/dL Final   • eGFR Non African Amer 09/11/2020 64  >60 mL/min/1.73 Final   • Globulin 09/11/2020 3.1  gm/dL Final   • A/G Ratio 09/11/2020 1.4  g/dL Final   • BUN/Creatinine Ratio 09/11/2020 13.2  7.0 - 25.0 Final   • Anion Gap 09/11/2020 10.0  5.0 - 15.0 mmol/L Final   • WBC 09/11/2020 7.40  3.40 - 10.80 10*3/mm3 Final   • RBC 09/11/2020 3.82* 4.14 - 5.80 10*6/mm3 Final   • Hemoglobin 09/11/2020 11.6* 13.0 - 17.7 g/dL Final   • Hematocrit 09/11/2020 35.2* 37.5 - 51.0 % Final   • RDW 09/11/2020  16.6* 12.3 - 15.4 % Final   • MCV 09/11/2020 92.2  79.0 - 97.0 fL Final   • MCH 09/11/2020 30.4  26.6 - 33.0 pg Final   • MCHC 09/11/2020 33.0  31.5 - 35.7 g/dL Final   • MPV 09/11/2020 6.8  6.0 - 12.0 fL Final   • Platelets 09/11/2020 303  140 - 450 10*3/mm3 Final   • Neutrophil % 09/11/2020 88.9* 42.7 - 76.0 % Final   • Lymphocyte % 09/11/2020 7.3* 19.6 - 45.3 % Final   • Monocyte % 09/11/2020 3.8* 5.0 - 12.0 % Final   • Neutrophils, Absolute 09/11/2020 6.60  1.70 - 7.00 10*3/mm3 Final   • Lymphocytes, Absolute 09/11/2020 0.50* 0.70 - 3.10 10*3/mm3 Final   • Monocytes, Absolute 09/11/2020 0.30  0.10 - 0.90 10*3/mm3 Final   • Creatinine 09/11/2020 1.1  mg/dL Final   • Creatinine 09/11/2020 1.10  0.60 - 1.30 mg/dL Final    Serial Number: [id number]:  [id number]        No results found.(  ASSESSMENT: The patient is a very pleasant 65 y.o. with metastatic adenocarcinoma of the lung    PROBLEM LIST:  1. Non-small cell lung cancer originally diagnosed in 2009 status post right upper lobectomy done by Dr. Real in 2009.  2. Left upper lobe lung nodules found on surveillance CT in 2012, status post left upper lobe wedge resection with benign pathology.  3.  Left lung cancer, adenocarcinoma:  A.  Presented as a new left lower lobe lung nodule found on chest x-ray done 6/2016.   B. CT scan of the chest done 6/16/2016 showing interval enlargement of left lung nodules. PET CT showing hypermetabolic activity in the left lower lobe nodule.   C. CT guided biopsy of left lower lobe nodule positive for adenocarcinoma of the lung. [id number], stage Ia.  C. Status post CyberKnife therapy completed 9/29/2016 per Dr. Lesia CHÁVEZ.  Repeated PET scan done on October 2019 revealed increase in size as well as hypermetabolic activity left upper lobe lung nodule with a new hypermetabolic active right supraclavicular lymph node and essentially other stable findings.  E.  Status post bronchoscopy with biopsy done by Dr. You November 6,  2019 revealed adenocarcinoma from level 4 left and suspicious cytology from left upper lobe.  F.  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  No enough tissue to do PDL 1 testing.  G.  Started carbo Alimta and Keytruda December 04, 2019, status post 4 cycle  H. Started maintenance Alimta/Keytruda on 2/28/2020 status post 10 cycles.   4.  Hemoptysis:  A.  Status post venous embolization done by Dr. Camp November 7, 2019  B.  Completed by radiation to left upper lobe mass November 26, 2019  4.  Black lung  5. Sarcoidosis  6. Hypothyroidism  7.  Conjunctivitis  8.  Treatment induced nausea  9. Seasonal allergies    PLAN:  1. We will proceed today using Alimta/Keytruda cycle #11 as planned.   2. We will see the patient back in 3 weeks for maintenance regimen using Alimta/Keytruda cycle #12.    3. We will continue to monitor the patient's labs throughout treatment including blood counts, kidney function, thyroid function, and liver functions.   4.  We reviewed again the potential side effects of immunotherapy including but not limited to immune mediated reactions with thyroiditis, pneumonitis, hepatitis, colitis, rash, and electrolytes abnormalities, fatigue, multiorgan failure, and possibly death.  5. We reviewed again the potential side effects of this regimen including fatigue, vomiting and nausea, hair loss, nephropathy, neuropathy, hearing loss, myelosuppression, and risk of infusion reaction.  6.  We will do 3 months follow-up PET imaging which will be due November 2020.  7.  The patient will continue Mucinex 600 mg take by mouth twice per day as needed for congestion and cough.   8. He will continue levothyroxine 75 mcg for hypothyroidism. We will adjust his dose if needed for fluctuations in TSH.   9.  We will continue Zofran as needed for chemotherapy-induced nausea.  10.  We will continue folic acid, vitamin B12, and Decadron per Alimta protocol.  11. The patient will  continue Miralax, Senakot, and Lactulose for constipation.   12.  He will continue saline eye drops as well as Pataday drops for conjunctivitis.  13. We will add Magic Mouthwash to use as needed for treatment induced sore throat. He was given a new prescription for this today.     Snehal Stallworth, APRN  10/2/2020

## 2020-10-23 ENCOUNTER — OFFICE VISIT (OUTPATIENT)
Dept: ONCOLOGY | Facility: CLINIC | Age: 65
End: 2020-10-23

## 2020-10-23 ENCOUNTER — HOSPITAL ENCOUNTER (OUTPATIENT)
Dept: ONCOLOGY | Facility: HOSPITAL | Age: 65
Setting detail: INFUSION SERIES
Discharge: HOME | End: 2020-10-23

## 2020-10-23 VITALS
DIASTOLIC BLOOD PRESSURE: 76 MMHG | BODY MASS INDEX: 29.49 KG/M2 | OXYGEN SATURATION: 96 % | HEART RATE: 94 BPM | HEIGHT: 65 IN | RESPIRATION RATE: 18 BRPM | TEMPERATURE: 98 F | SYSTOLIC BLOOD PRESSURE: 148 MMHG | WEIGHT: 177 LBS

## 2020-10-23 DIAGNOSIS — C34.12 MALIGNANT NEOPLASM OF UPPER LOBE OF LEFT LUNG (HCC): Primary | ICD-10-CM

## 2020-10-23 DIAGNOSIS — C34.32 MALIGNANT NEOPLASM OF LOWER LOBE OF LEFT LUNG (HCC): Primary | ICD-10-CM

## 2020-10-23 DIAGNOSIS — C34.12 MALIGNANT NEOPLASM OF UPPER LOBE OF LEFT LUNG (HCC): ICD-10-CM

## 2020-10-23 LAB
ALBUMIN SERPL-MCNC: 4.2 G/DL (ref 3.5–5.2)
ALBUMIN/GLOB SERPL: 1.6 G/DL
ALP SERPL-CCNC: 99 U/L (ref 39–117)
ALT SERPL W P-5'-P-CCNC: 15 U/L (ref 1–41)
ANION GAP SERPL CALCULATED.3IONS-SCNC: 13 MMOL/L (ref 5–15)
AST SERPL-CCNC: 26 U/L (ref 1–40)
BILIRUB SERPL-MCNC: 0.2 MG/DL (ref 0–1.2)
BUN SERPL-MCNC: 17 MG/DL (ref 8–23)
BUN/CREAT SERPL: 12.3 (ref 7–25)
CALCIUM SPEC-SCNC: 9.3 MG/DL (ref 8.6–10.5)
CHLORIDE SERPL-SCNC: 102 MMOL/L (ref 98–107)
CO2 SERPL-SCNC: 24 MMOL/L (ref 22–29)
CREAT BLDA-MCNC: 1.4 MG/DL (ref 0.6–1.3)
CREAT SERPL-MCNC: 1.38 MG/DL (ref 0.76–1.27)
ERYTHROCYTE [DISTWIDTH] IN BLOOD BY AUTOMATED COUNT: 16.7 % (ref 12.3–15.4)
GFR SERPL CREATININE-BSD FRML MDRD: 52 ML/MIN/1.73
GLOBULIN UR ELPH-MCNC: 2.6 GM/DL
GLUCOSE SERPL-MCNC: 193 MG/DL (ref 65–99)
HCT VFR BLD AUTO: 35.1 % (ref 37.5–51)
HGB BLD-MCNC: 11.6 G/DL (ref 13–17.7)
LYMPHOCYTES # BLD AUTO: 0.4 10*3/MM3 (ref 0.7–3.1)
LYMPHOCYTES NFR BLD AUTO: 4.7 % (ref 19.6–45.3)
MCH RBC QN AUTO: 31.4 PG (ref 26.6–33)
MCHC RBC AUTO-ENTMCNC: 33 G/DL (ref 31.5–35.7)
MCV RBC AUTO: 95 FL (ref 79–97)
MONOCYTES # BLD AUTO: 0.6 10*3/MM3 (ref 0.1–0.9)
MONOCYTES NFR BLD AUTO: 7.3 % (ref 5–12)
NEUTROPHILS NFR BLD AUTO: 7 10*3/MM3 (ref 1.7–7)
NEUTROPHILS NFR BLD AUTO: 88 % (ref 42.7–76)
PLATELET # BLD AUTO: 326 10*3/MM3 (ref 140–450)
PMV BLD AUTO: 7 FL (ref 6–12)
POTASSIUM SERPL-SCNC: 3.6 MMOL/L (ref 3.5–5.2)
PROT SERPL-MCNC: 6.8 G/DL (ref 6–8.5)
RBC # BLD AUTO: 3.69 10*6/MM3 (ref 4.14–5.8)
SODIUM SERPL-SCNC: 139 MMOL/L (ref 136–145)
WBC # BLD AUTO: 8 10*3/MM3 (ref 3.4–10.8)

## 2020-10-23 PROCEDURE — 96413 CHEMO IV INFUSION 1 HR: CPT

## 2020-10-23 PROCEDURE — 96411 CHEMO IV PUSH ADDL DRUG: CPT

## 2020-10-23 PROCEDURE — 82565 ASSAY OF CREATININE: CPT

## 2020-10-23 PROCEDURE — 25010000002 PEMETREXED PER 10 MG: Performed by: INTERNAL MEDICINE

## 2020-10-23 PROCEDURE — 25010000002 PEMBROLIZUMAB 100 MG/4ML SOLUTION 4 ML VIAL: Performed by: INTERNAL MEDICINE

## 2020-10-23 PROCEDURE — 85025 COMPLETE CBC W/AUTO DIFF WBC: CPT | Performed by: INTERNAL MEDICINE

## 2020-10-23 PROCEDURE — 25010000002 PEMETREXED 100 MG RECONSTITUTED SOLUTION 100 MG VIAL: Performed by: INTERNAL MEDICINE

## 2020-10-23 PROCEDURE — 80053 COMPREHEN METABOLIC PANEL: CPT | Performed by: INTERNAL MEDICINE

## 2020-10-23 PROCEDURE — 99215 OFFICE O/P EST HI 40 MIN: CPT | Performed by: INTERNAL MEDICINE

## 2020-10-23 RX ORDER — CIPROFLOXACIN 500 MG/1
500 TABLET, FILM COATED ORAL 2 TIMES DAILY
COMMUNITY
Start: 2020-10-13 | End: 2021-01-22

## 2020-10-23 RX ORDER — SODIUM CHLORIDE 9 MG/ML
250 INJECTION, SOLUTION INTRAVENOUS ONCE
Status: CANCELLED | OUTPATIENT
Start: 2020-10-23

## 2020-10-23 RX ORDER — SODIUM CHLORIDE 9 MG/ML
250 INJECTION, SOLUTION INTRAVENOUS ONCE
Status: DISCONTINUED | OUTPATIENT
Start: 2020-10-23 | End: 2020-10-24 | Stop reason: HOSPADM

## 2020-10-23 RX ORDER — SODIUM CHLORIDE 9 MG/ML
250 INJECTION, SOLUTION INTRAVENOUS ONCE
Status: CANCELLED | OUTPATIENT
Start: 2020-11-13

## 2020-10-23 RX ORDER — LIDOCAINE HYDROCHLORIDE 20 MG/ML
SOLUTION OROPHARYNGEAL
COMMUNITY
Start: 2020-10-02

## 2020-10-23 RX ADMIN — SODIUM CHLORIDE 900 MG: 9 INJECTION, SOLUTION INTRAVENOUS at 11:24

## 2020-10-23 RX ADMIN — SODIUM CHLORIDE 200 MG: 9 INJECTION, SOLUTION INTRAVENOUS at 10:47

## 2020-10-23 NOTE — PROGRESS NOTES
DATE OF VISIT: 10/23/2020    REASON FOR VISIT: Recurrent adenocarcinoma of the lung       HISTORY OF PRESENT ILLNESS: The patient is a very pleasant 65 y.o. male  with past medical history significant for lung cancer diagnosed 6/22/2016 . 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 PET scan done 6/22/2016 showed findings consistent with recurrent neoplasm in the left upper lobe, with metastatic adenopathy to the left hilum and AP window as well as a metastatic nodule in the left lower lobe. CT-guided biopsy of the left lower lobe nodule was performed that showed adenocarcinoma of the lung. The patient underwent CyberKnife therapy by Dr Pinzon completed 9/29/2016.  Repeat scan showed gradual increase in size of left upper lobe lung infiltrates.  Patient had whole-body PET scan that revealed increased metabolic activity in the left lung infiltrate as well as a new right supraclavicular lymph nodes.  Patient had bronchoscopy with biopsy done by Dr. You on November 6, 2019 that revealed adenocarcinoma and level 4 left mediastinal lymph node.  He received palliative course of radiation to the left upper lobe secondary to persistent hemoptysis followed by palliative treatment with carboplatin Alimta and Keytruda started December 4, 2019.  Completed 4 cycles on February 7, 2020.  The patient was started on Alimta with Keytruda on February 28, 2020.  The patient is here today for scheduled follow up visit with treatment cycle #12.     SUBJECTIVE: The patient is here today by himself.  He is able to tolerate chemotherapy with multiple side effects.  His mouth mucositis did improve with a prescription given for Magic mouthwash.  His shortness of breath as well as cough are stable and mostly with exertion.  He is having nausea that improved with Zofran.    PAST MEDICAL HISTORY/SOCIAL HISTORY/FAMILY HISTORY: Unchanged from my prior  documentation done on 08/02/2016.     Review of Systems   Constitutional: Negative for activity change, appetite change, chills, fatigue, fever and unexpected weight change.   HENT: Positive for sore throat. Negative for hearing loss, mouth sores, nosebleeds, rhinorrhea, sneezing and trouble swallowing.    Eyes: Negative for itching and visual disturbance.        Tearing   Respiratory: Positive for cough and shortness of breath. Negative for chest tightness and wheezing.         With activity   Cardiovascular: Negative for chest pain, palpitations and leg swelling.   Gastrointestinal: Positive for nausea. Negative for abdominal distention, abdominal pain, blood in stool, constipation, diarrhea, rectal pain and vomiting.   Endocrine: Negative for cold intolerance and heat intolerance.   Genitourinary: Negative for difficulty urinating, dysuria, frequency and urgency.   Musculoskeletal: Negative for arthralgias, back pain, gait problem, joint swelling and myalgias.   Skin: Negative for rash.   Neurological: Negative for dizziness, tremors, syncope, weakness, light-headedness, numbness and headaches.   Hematological: Negative for adenopathy. Does not bruise/bleed easily.   Psychiatric/Behavioral: Negative for confusion, sleep disturbance and suicidal ideas. The patient is not nervous/anxious.          Current Outpatient Medications:   •  albuterol (PROVENTIL HFA;VENTOLIN HFA) 108 (90 BASE) MCG/ACT inhaler, Inhale 2 puffs every 4 (four) hours as needed for wheezing or shortness of air., Disp: , Rfl:   •  ciprofloxacin (CIPRO) 500 MG tablet, Take 500 mg by mouth 2 (Two) Times a Day., Disp: , Rfl:   •  dexamethasone (DECADRON) 4 MG tablet, Take 1 tablet twice daily starting the day before chemo, day of chemo, and day after chemo.  Take with food., Disp: 30 tablet, Rfl: 3  •  dexamethasone (DECADRON) 4 MG tablet, Take 1 tablet twice daily starting the day before chemo, day of chemo, and day after chemo.  Take with food.,  "Disp: 30 tablet, Rfl: 5  •  folic acid (FOLVITE) 1 MG tablet, Take 1 tablet by mouth Daily. Start at least 7 days prior to chemotherapy until at least 3 weeks after all chemotherapy., Disp: 30 tablet, Rfl: 5  •  guaiFENesin (MUCINEX) 600 MG 12 hr tablet, Take 1 tablet by mouth 2 (Two) Times a Day., Disp: 60 tablet, Rfl: 5  •  HYDROcodone-acetaminophen (NORCO) 7.5-325 MG per tablet, Take 1 tablet by mouth every 6 (six) hours as needed for moderate pain (4-6)., Disp: , Rfl:   •  INCRUSE ELLIPTA 62.5 MCG/INH aerosol powder , Inhale 1 puff Daily., Disp: , Rfl:   •  lactulose (CHRONULAC) 10 GM/15ML solution, Take 30 mL by mouth 3 (Three) Times a Day. PRN constipation, Disp: 240 mL, Rfl: 2  •  levocetirizine (XYZAL) 5 MG tablet, Take 5 mg by mouth Every Evening., Disp: , Rfl:   •  levothyroxine (SYNTHROID, LEVOTHROID) 75 MCG tablet, Take 75 mcg by mouth Daily., Disp: , Rfl:   •  Lidocaine Viscous HCl (XYLOCAINE) 2 % solution, , Disp: , Rfl:   •  magic mouthwash oral suspension, Swish and spit or swallow 5-10 mL 4 (Four) Times a Day As Needed., Disp: 240 mL, Rfl: 3  •  omeprazole (priLOSEC) 40 MG capsule, Take 40 mg by mouth Daily., Disp: , Rfl:   •  ondansetron (ZOFRAN) 8 MG tablet, Take 1 tablet by mouth 3 (Three) Times a Day As Needed for Nausea or Vomiting., Disp: 30 tablet, Rfl: 5  •  promethazine (PHENERGAN) 25 MG tablet, , Disp: , Rfl:   •  SYMBICORT 160-4.5 MCG/ACT inhaler, INL 2 PUFFS PO BID, Disp: , Rfl: 3  •  tamsulosin (FLOMAX) 0.4 MG capsule 24 hr capsule, Take 1 capsule by mouth Daily. Pt will need to contact PCP for further refills., Disp: 30 capsule, Rfl: 0    PHYSICAL EXAMINATION:   /76   Pulse 94   Temp 98 °F (36.7 °C) (Temporal)   Resp 18   Ht 164.6 cm (64.8\")   Wt 80.3 kg (177 lb)   SpO2 96%   BMI 29.63 kg/m²    Pain Score    10/23/20 0858   PainSc: 0-No pain      ECOG Performance Status: 1 - Symptomatic but completely ambulatory  General Appearance:  alert, cooperative, no apparent " distress and appears stated age   Neurologic/Psychiatric: A&O x 3, gait steady, appropriate affect, strength 5/5 in all muscle groups   HEENT:  Normocephalic, without obvious abnormality, mucous membranes moist   Neck: Supple, symmetrical, trachea midline, no adenopathy;  No thyromegaly, masses, or tenderness   Lungs:   Scattered expiratory wheezes throughout; respirations regular, even, and unlabored bilaterally   Heart:  Regular rate and rhythm, no murmurs appreciated   Abdomen:   Soft, non-tender, non-distended and no organomegaly   Lymph nodes: No cervical, supraclavicular, inguinal or axillary adenopathy noted   Extremities: Normal, atraumatic; no clubbing, cyanosis, or edema    Skin: No rashes, ulcers, or suspicious lesions noted     No visits with results within 2 Week(s) from this visit.   Latest known visit with results is:   Hospital Outpatient Visit on 10/02/2020   Component Date Value Ref Range Status   • Glucose 10/02/2020 132* 65 - 99 mg/dL Final   • BUN 10/02/2020 14  8 - 23 mg/dL Final   • Creatinine 10/02/2020 1.19  0.76 - 1.27 mg/dL Final   • Sodium 10/02/2020 138  136 - 145 mmol/L Final   • Potassium 10/02/2020 3.8  3.5 - 5.2 mmol/L Final    Slight hemolysis detected by analyzer. Results may be affected.   • Chloride 10/02/2020 101  98 - 107 mmol/L Final   • CO2 10/02/2020 23.0  22.0 - 29.0 mmol/L Final   • Calcium 10/02/2020 9.3  8.6 - 10.5 mg/dL Final   • Total Protein 10/02/2020 7.0  6.0 - 8.5 g/dL Final   • Albumin 10/02/2020 4.50  3.50 - 5.20 g/dL Final   • ALT (SGPT) 10/02/2020 16  1 - 41 U/L Final   • AST (SGOT) 10/02/2020 27  1 - 40 U/L Final   • Alkaline Phosphatase 10/02/2020 94  39 - 117 U/L Final   • Total Bilirubin 10/02/2020 0.3  0.0 - 1.2 mg/dL Final   • eGFR Non African Amer 10/02/2020 61  >60 mL/min/1.73 Final   • Globulin 10/02/2020 2.5  gm/dL Final   • A/G Ratio 10/02/2020 1.8  g/dL Final   • BUN/Creatinine Ratio 10/02/2020 11.8  7.0 - 25.0 Final   • Anion Gap 10/02/2020 14.0   5.0 - 15.0 mmol/L Final   • TSH 10/02/2020 0.866  0.270 - 4.200 uIU/mL Final   • Free T4 10/02/2020 1.28  0.93 - 1.70 ng/dL Final   • WBC 10/02/2020 6.00  3.40 - 10.80 10*3/mm3 Final   • RBC 10/02/2020 4.34  4.14 - 5.80 10*6/mm3 Final   • Hemoglobin 10/02/2020 13.2  13.0 - 17.7 g/dL Final   • Hematocrit 10/02/2020 40.6  37.5 - 51.0 % Final   • RDW 10/02/2020 16.8* 12.3 - 15.4 % Final   • MCV 10/02/2020 93.6  79.0 - 97.0 fL Final   • MCH 10/02/2020 30.4  26.6 - 33.0 pg Final   • MCHC 10/02/2020 32.5  31.5 - 35.7 g/dL Final   • MPV 10/02/2020 7.0  6.0 - 12.0 fL Final   • Platelets 10/02/2020 329  140 - 450 10*3/mm3 Final   • Neutrophil % 10/02/2020 87.8* 42.7 - 76.0 % Final   • Lymphocyte % 10/02/2020 6.4* 19.6 - 45.3 % Final   • Monocyte % 10/02/2020 5.8  5.0 - 12.0 % Final   • Neutrophils, Absolute 10/02/2020 5.30  1.70 - 7.00 10*3/mm3 Final   • Lymphocytes, Absolute 10/02/2020 0.40* 0.70 - 3.10 10*3/mm3 Final   • Monocytes, Absolute 10/02/2020 0.30  0.10 - 0.90 10*3/mm3 Final   • Creatinine 10/02/2020 1.20  0.60 - 1.30 mg/dL Final    Serial Number: [id number]:  [id number]        No results found.(  ASSESSMENT: The patient is a very pleasant 65 y.o. with metastatic adenocarcinoma of the lung    PROBLEM LIST:  1. Non-small cell lung cancer originally diagnosed in 2009 status post right upper lobectomy done by Dr. Real in 2009.  2. Left upper lobe lung nodules found on surveillance CT in 2012, status post left upper lobe wedge resection with benign pathology.  3.  Left lung cancer, adenocarcinoma:  A.  Presented as a new left lower lobe lung nodule found on chest x-ray done 6/2016.   B. CT scan of the chest done 6/16/2016 showing interval enlargement of left lung nodules. PET CT showing hypermetabolic activity in the left lower lobe nodule.   C. CT guided biopsy of left lower lobe nodule positive for adenocarcinoma of the lung. [id number], stage Ia.  C. Status post CyberKnife therapy completed 9/29/2016 per Dr. Graf  Jeromy CHÁVEZ.  Repeated PET scan done on October 2019 revealed increase in size as well as hypermetabolic activity left upper lobe lung nodule with a new hypermetabolic active right supraclavicular lymph node and essentially other stable findings.  E.  Status post bronchoscopy with biopsy done by Dr. You November 6, 2019 revealed adenocarcinoma from level 4 left and suspicious cytology from left upper lobe.  F.  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  No enough tissue to do PDL 1 testing.  G.  Started carbo Alimta and Keytruda December 04, 2019, status post 4 cycle  H. Started maintenance Alimta/Keytruda on 2/28/2020 status post 11 cycles.   4.  Hemoptysis:  A.  Status post venous embolization done by Dr. Camp November 7, 2019  B.  Completed by radiation to left upper lobe mass November 26, 2019  4.  Black lung  5. Sarcoidosis  6. Hypothyroidism  7.  Conjunctivitis  8.  Treatment induced nausea  9. Seasonal allergies    PLAN:  1. We will proceed today using Alimta/Keytruda cycle #12 as planned.   2. We will see the patient back in 3 weeks for maintenance regimen using Alimta/Keytruda cycle #13.    3. We will continue to monitor the patient's labs throughout treatment including blood counts, kidney function, thyroid function, and liver functions.   4.  We reviewed again the potential side effects of immunotherapy including but not limited to immune mediated reactions with thyroiditis, pneumonitis, hepatitis, colitis, rash, and electrolytes abnormalities, fatigue, multiorgan failure, and possibly death.  5. We reviewed again the potential side effects of this regimen including fatigue, vomiting and nausea, hair loss, nephropathy, neuropathy, hearing loss, myelosuppression, and risk of infusion reaction.  6.  We will do 3 months follow-up PET imaging which will be due November 2020.  This will be ordered prior to return.  7.  The patient will continue Mucinex 600 mg take by  mouth twice per day as needed for congestion and cough.   8. He will continue levothyroxine 75 mcg for hypothyroidism. We will adjust his dose if needed for fluctuations in TSH.   9.  We will continue Zofran as needed for chemotherapy-induced nausea.  10.  We will continue folic acid, vitamin B12, and Decadron per [facility] protocol.  11. The patient will continue Miralax, Senakot, and Lactulose for constipation.   12.  He will continue saline eye drops as well as Pataday drops for conjunctivitis.  13.  I will continue Magic Mouthwash to use as needed for treatment induced sore throat.   Padmaja Denny MD  10/23/2020

## 2020-11-10 ENCOUNTER — HOSPITAL ENCOUNTER (OUTPATIENT)
Dept: PET IMAGING | Facility: HOSPITAL | Age: 65
Discharge: HOME | End: 2020-11-10

## 2020-11-10 ENCOUNTER — LAB (OUTPATIENT)
Dept: LAB | Facility: HOSPITAL | Age: 65
End: 2020-11-10

## 2020-11-10 DIAGNOSIS — C34.12 MALIGNANT NEOPLASM OF UPPER LOBE OF LEFT LUNG (HCC): ICD-10-CM

## 2020-11-10 DIAGNOSIS — C34.32 MALIGNANT NEOPLASM OF LOWER LOBE OF LEFT LUNG (HCC): ICD-10-CM

## 2020-11-10 LAB
ALBUMIN SERPL-MCNC: 4 G/DL (ref 3.5–5.2)
ALBUMIN/GLOB SERPL: 1.3 G/DL
ALP SERPL-CCNC: 115 U/L (ref 39–117)
ALT SERPL W P-5'-P-CCNC: 15 U/L (ref 1–41)
ANION GAP SERPL CALCULATED.3IONS-SCNC: 8 MMOL/L (ref 5–15)
AST SERPL-CCNC: 24 U/L (ref 1–40)
BASOPHILS # BLD AUTO: 0.04 10*3/MM3 (ref 0–0.2)
BASOPHILS NFR BLD AUTO: 0.9 % (ref 0–1.5)
BILIRUB SERPL-MCNC: 0.4 MG/DL (ref 0–1.2)
BUN SERPL-MCNC: 13 MG/DL (ref 8–23)
BUN/CREAT SERPL: 9.6 (ref 7–25)
CALCIUM SPEC-SCNC: 9.1 MG/DL (ref 8.6–10.5)
CHLORIDE SERPL-SCNC: 102 MMOL/L (ref 98–107)
CO2 SERPL-SCNC: 29 MMOL/L (ref 22–29)
CREAT SERPL-MCNC: 1.35 MG/DL (ref 0.76–1.27)
DEPRECATED RDW RBC AUTO: 58.6 FL (ref 37–54)
EOSINOPHIL # BLD AUTO: 0.13 10*3/MM3 (ref 0–0.4)
EOSINOPHIL NFR BLD AUTO: 2.8 % (ref 0.3–6.2)
ERYTHROCYTE [DISTWIDTH] IN BLOOD BY AUTOMATED COUNT: 16.2 % (ref 12.3–15.4)
GFR SERPL CREATININE-BSD FRML MDRD: 53 ML/MIN/1.73
GLOBULIN UR ELPH-MCNC: 3 GM/DL
GLUCOSE BLDC GLUCOMTR-MCNC: 103 MG/DL (ref 70–130)
GLUCOSE SERPL-MCNC: 85 MG/DL (ref 65–99)
HCT VFR BLD AUTO: 35.2 % (ref 37.5–51)
HGB BLD-MCNC: 11.1 G/DL (ref 13–17.7)
IMM GRANULOCYTES # BLD AUTO: 0.03 10*3/MM3 (ref 0–0.05)
IMM GRANULOCYTES NFR BLD AUTO: 0.6 % (ref 0–0.5)
LYMPHOCYTES # BLD AUTO: 0.57 10*3/MM3 (ref 0.7–3.1)
LYMPHOCYTES NFR BLD AUTO: 12.2 % (ref 19.6–45.3)
MCH RBC QN AUTO: 31.2 PG (ref 26.6–33)
MCHC RBC AUTO-ENTMCNC: 31.5 G/DL (ref 31.5–35.7)
MCV RBC AUTO: 98.9 FL (ref 79–97)
MONOCYTES # BLD AUTO: 0.8 10*3/MM3 (ref 0.1–0.9)
MONOCYTES NFR BLD AUTO: 17.1 % (ref 5–12)
NEUTROPHILS NFR BLD AUTO: 3.1 10*3/MM3 (ref 1.7–7)
NEUTROPHILS NFR BLD AUTO: 66.4 % (ref 42.7–76)
NRBC BLD AUTO-RTO: 0 /100 WBC (ref 0–0.2)
PLATELET # BLD AUTO: 251 10*3/MM3 (ref 140–450)
PMV BLD AUTO: 9.8 FL (ref 6–12)
POTASSIUM SERPL-SCNC: 4.8 MMOL/L (ref 3.5–5.2)
PROT SERPL-MCNC: 7 G/DL (ref 6–8.5)
RBC # BLD AUTO: 3.56 10*6/MM3 (ref 4.14–5.8)
SODIUM SERPL-SCNC: 139 MMOL/L (ref 136–145)
T4 FREE SERPL-MCNC: 1.25 NG/DL (ref 0.93–1.7)
TSH SERPL DL<=0.05 MIU/L-ACNC: 6.32 UIU/ML (ref 0.27–4.2)
WBC # BLD AUTO: 4.67 10*3/MM3 (ref 3.4–10.8)

## 2020-11-10 PROCEDURE — 78815 PET IMAGE W/CT SKULL-THIGH: CPT

## 2020-11-10 PROCEDURE — A9552 F18 FDG: HCPCS | Performed by: INTERNAL MEDICINE

## 2020-11-10 PROCEDURE — 85025 COMPLETE CBC W/AUTO DIFF WBC: CPT

## 2020-11-10 PROCEDURE — 80053 COMPREHEN METABOLIC PANEL: CPT

## 2020-11-10 PROCEDURE — 84439 ASSAY OF FREE THYROXINE: CPT

## 2020-11-10 PROCEDURE — 82962 GLUCOSE BLOOD TEST: CPT

## 2020-11-10 PROCEDURE — 36415 COLL VENOUS BLD VENIPUNCTURE: CPT

## 2020-11-10 PROCEDURE — 0 FLUDEOXYGLUCOSE F18 SOLUTION: Performed by: INTERNAL MEDICINE

## 2020-11-10 PROCEDURE — 84443 ASSAY THYROID STIM HORMONE: CPT

## 2020-11-10 RX ADMIN — FLUDEOXYGLUCOSE F18 1 DOSE: 300 INJECTION INTRAVENOUS at 07:51

## 2020-11-11 NOTE — PROGRESS NOTES
DATE OF VISIT: 11/13/2020    REASON FOR VISIT: Recurrent adenocarcinoma of the lung       HISTORY OF PRESENT ILLNESS: The patient is a very pleasant 65 y.o. male  with past medical history significant for lung cancer diagnosed 6/22/2016 . 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 PET scan done 6/22/2016 showed findings consistent with recurrent neoplasm in the left upper lobe, with metastatic adenopathy to the left hilum and AP window as well as a metastatic nodule in the left lower lobe. CT-guided biopsy of the left lower lobe nodule was performed that showed adenocarcinoma of the lung. The patient underwent CyberKnife therapy by Dr Pinzon completed 9/29/2016.  Repeat scan showed gradual increase in size of left upper lobe lung infiltrates.  Patient had whole-body PET scan that revealed increased metabolic activity in the left lung infiltrate as well as a new right supraclavicular lymph nodes.  Patient had bronchoscopy with biopsy done by Dr. You on November 6, 2019 that revealed adenocarcinoma and level 4 left mediastinal lymph node.  He received palliative course of radiation to the left upper lobe secondary to persistent hemoptysis followed by palliative treatment with carboplatin Alimta and Keytruda started December 4, 2019.  Completed 4 cycles on February 7, 2020.  The patient was started on Alimta with Keytruda on February 28, 2020.  The patient is here today for scheduled follow up visit with treatment cycle #13.     SUBJECTIVE: The patient is here today by himself.  He is doing well.  His breathing is stable.  He has mild fatigue.  He does have on and off sore throat.  He is anxious about the scan results.    PAST MEDICAL HISTORY/SOCIAL HISTORY/FAMILY HISTORY: Unchanged from my prior documentation done on 08/02/2016.     Review of Systems   Constitutional: Negative for activity change, appetite change, chills,  fatigue, fever and unexpected weight change.   HENT: Positive for sore throat. Negative for hearing loss, mouth sores, nosebleeds, rhinorrhea, sneezing and trouble swallowing.    Eyes: Negative for itching and visual disturbance.        Tearing   Respiratory: Positive for cough and shortness of breath. Negative for chest tightness and wheezing.         With activity   Cardiovascular: Negative for chest pain, palpitations and leg swelling.   Gastrointestinal: Positive for nausea. Negative for abdominal distention, abdominal pain, blood in stool, constipation, diarrhea, rectal pain and vomiting.   Endocrine: Negative for cold intolerance and heat intolerance.   Genitourinary: Negative for difficulty urinating, dysuria, frequency and urgency.   Musculoskeletal: Negative for arthralgias, back pain, gait problem, joint swelling and myalgias.   Skin: Negative for rash.   Neurological: Negative for dizziness, tremors, syncope, weakness, light-headedness, numbness and headaches.   Hematological: Negative for adenopathy. Does not bruise/bleed easily.   Psychiatric/Behavioral: Negative for confusion, sleep disturbance and suicidal ideas. The patient is not nervous/anxious.          Current Outpatient Medications:   •  albuterol (PROVENTIL HFA;VENTOLIN HFA) 108 (90 BASE) MCG/ACT inhaler, Inhale 2 puffs every 4 (four) hours as needed for wheezing or shortness of air., Disp: , Rfl:   •  ciprofloxacin (CIPRO) 500 MG tablet, Take 500 mg by mouth 2 (Two) Times a Day., Disp: , Rfl:   •  dexamethasone (DECADRON) 4 MG tablet, Take 1 tablet twice daily starting the day before chemo, day of chemo, and day after chemo.  Take with food., Disp: 30 tablet, Rfl: 3  •  dexamethasone (DECADRON) 4 MG tablet, Take 1 tablet twice daily starting the day before chemo, day of chemo, and day after chemo.  Take with food., Disp: 30 tablet, Rfl: 5  •  folic acid (FOLVITE) 1 MG tablet, Take 1 tablet by mouth Daily. Start at least 7 days prior to  "chemotherapy until at least 3 weeks after all chemotherapy., Disp: 30 tablet, Rfl: 5  •  guaiFENesin (MUCINEX) 600 MG 12 hr tablet, Take 1 tablet by mouth 2 (Two) Times a Day., Disp: 60 tablet, Rfl: 5  •  HYDROcodone-acetaminophen (NORCO) 7.5-325 MG per tablet, Take 1 tablet by mouth every 6 (six) hours as needed for moderate pain (4-6)., Disp: , Rfl:   •  INCRUSE ELLIPTA 62.5 MCG/INH aerosol powder , Inhale 1 puff Daily., Disp: , Rfl:   •  lactulose (CHRONULAC) 10 GM/15ML solution, Take 30 mL by mouth 3 (Three) Times a Day. PRN constipation, Disp: 240 mL, Rfl: 2  •  levocetirizine (XYZAL) 5 MG tablet, Take 5 mg by mouth Every Evening., Disp: , Rfl:   •  levothyroxine (SYNTHROID, LEVOTHROID) 75 MCG tablet, Take 75 mcg by mouth Daily., Disp: , Rfl:   •  Lidocaine Viscous HCl (XYLOCAINE) 2 % solution, , Disp: , Rfl:   •  magic mouthwash oral suspension, Swish and spit or swallow 5-10 mL 4 (Four) Times a Day As Needed., Disp: 240 mL, Rfl: 3  •  omeprazole (priLOSEC) 40 MG capsule, Take 40 mg by mouth Daily., Disp: , Rfl:   •  ondansetron (ZOFRAN) 8 MG tablet, Take 1 tablet by mouth 3 (Three) Times a Day As Needed for Nausea or Vomiting., Disp: 30 tablet, Rfl: 5  •  promethazine (PHENERGAN) 25 MG tablet, , Disp: , Rfl:   •  SYMBICORT 160-4.5 MCG/ACT inhaler, INL 2 PUFFS PO BID, Disp: , Rfl: 3  •  tamsulosin (FLOMAX) 0.4 MG capsule 24 hr capsule, Take 1 capsule by mouth Daily. Pt will need to contact PCP for further refills., Disp: 30 capsule, Rfl: 0    PHYSICAL EXAMINATION:   /73   Pulse 91   Temp 97.3 °F (36.3 °C)   Resp 16   Ht 163.8 cm (64.5\")   Wt 80.3 kg (177 lb)   SpO2 94%   BMI 29.91 kg/m²    Pain Score    11/13/20 1001   PainSc: 0-No pain      ECOG Performance Status: 1 - Symptomatic but completely ambulatory  General Appearance:  alert, cooperative, no apparent distress and appears stated age   Neurologic/Psychiatric: A&O x 3, gait steady, appropriate affect, strength 5/5 in all muscle groups "   HEENT:  Normocephalic, without obvious abnormality, mucous membranes moist   Neck: Supple, symmetrical, trachea midline, no adenopathy;  No thyromegaly, masses, or tenderness   Lungs:   Scattered expiratory wheezes throughout; respirations regular, even, and unlabored bilaterally   Heart:  Regular rate and rhythm, no murmurs appreciated   Abdomen:   Soft, non-tender, non-distended and no organomegaly   Lymph nodes: No cervical, supraclavicular, inguinal or axillary adenopathy noted   Extremities: Normal, atraumatic; no clubbing, cyanosis, or edema    Skin: No rashes, ulcers, or suspicious lesions noted     Hospital Outpatient Visit on 11/10/2020   Component Date Value Ref Range Status   • Glucose 11/10/2020 103  70 - 130 mg/dL Final   Lab on 11/10/2020   Component Date Value Ref Range Status   • Glucose 11/10/2020 85  65 - 99 mg/dL Final   • BUN 11/10/2020 13  8 - 23 mg/dL Final   • Creatinine 11/10/2020 1.35* 0.76 - 1.27 mg/dL Final   • Sodium 11/10/2020 139  136 - 145 mmol/L Final   • Potassium 11/10/2020 4.8  3.5 - 5.2 mmol/L Final   • Chloride 11/10/2020 102  98 - 107 mmol/L Final   • CO2 11/10/2020 29.0  22.0 - 29.0 mmol/L Final   • Calcium 11/10/2020 9.1  8.6 - 10.5 mg/dL Final   • Total Protein 11/10/2020 7.0  6.0 - 8.5 g/dL Final   • Albumin 11/10/2020 4.00  3.50 - 5.20 g/dL Final   • ALT (SGPT) 11/10/2020 15  1 - 41 U/L Final   • AST (SGOT) 11/10/2020 24  1 - 40 U/L Final   • Alkaline Phosphatase 11/10/2020 115  39 - 117 U/L Final   • Total Bilirubin 11/10/2020 0.4  0.0 - 1.2 mg/dL Final   • eGFR Non African Amer 11/10/2020 53* >60 mL/min/1.73 Final   • Globulin 11/10/2020 3.0  gm/dL Final   • A/G Ratio 11/10/2020 1.3  g/dL Final   • BUN/Creatinine Ratio 11/10/2020 9.6  7.0 - 25.0 Final   • Anion Gap 11/10/2020 8.0  5.0 - 15.0 mmol/L Final   • TSH 11/10/2020 6.320* 0.270 - 4.200 uIU/mL Final   • Free T4 11/10/2020 1.25  0.93 - 1.70 ng/dL Final   • WBC 11/10/2020 4.67  3.40 - 10.80 10*3/mm3 Final   • RBC  11/10/2020 3.56* 4.14 - 5.80 10*6/mm3 Final   • Hemoglobin 11/10/2020 11.1* 13.0 - 17.7 g/dL Final   • Hematocrit 11/10/2020 35.2* 37.5 - 51.0 % Final   • MCV 11/10/2020 98.9* 79.0 - 97.0 fL Final   • MCH 11/10/2020 31.2  26.6 - 33.0 pg Final   • MCHC 11/10/2020 31.5  31.5 - 35.7 g/dL Final   • RDW 11/10/2020 16.2* 12.3 - 15.4 % Final   • RDW-SD 11/10/2020 58.6* 37.0 - 54.0 fl Final   • MPV 11/10/2020 9.8  6.0 - 12.0 fL Final   • Platelets 11/10/2020 251  140 - 450 10*3/mm3 Final   • Neutrophil % 11/10/2020 66.4  42.7 - 76.0 % Final   • Lymphocyte % 11/10/2020 12.2* 19.6 - 45.3 % Final   • Monocyte % 11/10/2020 17.1* 5.0 - 12.0 % Final   • Eosinophil % 11/10/2020 2.8  0.3 - 6.2 % Final   • Basophil % 11/10/2020 0.9  0.0 - 1.5 % Final   • Immature Grans % 11/10/2020 0.6* 0.0 - 0.5 % Final   • Neutrophils, Absolute 11/10/2020 3.10  1.70 - 7.00 10*3/mm3 Final   • Lymphocytes, Absolute 11/10/2020 0.57* 0.70 - 3.10 10*3/mm3 Final   • Monocytes, Absolute 11/10/2020 0.80  0.10 - 0.90 10*3/mm3 Final   • Eosinophils, Absolute 11/10/2020 0.13  0.00 - 0.40 10*3/mm3 Final   • Basophils, Absolute 11/10/2020 0.04  0.00 - 0.20 10*3/mm3 Final   • Immature Grans, Absolute 11/10/2020 0.03  0.00 - 0.05 10*3/mm3 Final   • nRBC 11/10/2020 0.0  0.0 - 0.2 /100 WBC Final        Nm Pet Skull Base To Mid Thigh    Result Date: 11/11/2020  Narrative: EXAMINATION: NM PET SKULL BASE TO MID THIGH- 11/10/2020  INDICATION: Lung cancer restaging; C34.32-Malignant neoplasm of lower lobe, left bronchus or lung; C34.12-Malignant neoplasm of upper lobe, left bronchus or lung  TECHNIQUE: Fasting blood glucose 103 mg/dL. Radiopharmaceutical injection 12.47 mCi of F-18 FDG injected into a right AC vein with patient imaged after appropriate amount of time.  The radiation dose reduction device was turned on for each scan per the ALARA (As Low as Reasonably Achievable) protocol.  COMPARISON: PET/CT dated 08/20/2020  FINDINGS:  HEAD AND NECK: Grossly symmetric  appearance of cerebral hemispheres on limited intracranial evaluation. Physiologic variant activity within the extraocular muscles, muscles of phonation and tonsils. Right supraclavicular adenopathy with maximum SUV 4.3 previously 3.1 increased  CHEST: Physiologic activity within the left ventricular myocardium. Right lower lobe pulmonary nodule has abnormal hypermetabolism maximum SUV 3.0 previously 2.5 increased in the interim with nodular focus in the left midlung near the periphery remain non hypermetabolic at 1.9. Mediastinal and hilar adenopathy including prevascular lymph node involvement and AP window index node maximum SUV 3.6 previously measuring 3.2 although which increased in the interim. Intense hypermetabolic activity left upper lung with scattered opacifications and pleural involvement of at least some abutting regions maximum SUV 8.4 previously 5.8 of progressive involvement  ABDOMEN AND PELVIS: Liver, spleen, renal collecting systems and GI tract without abnormal hypermetabolism demonstrate normal physiologic activity. No abnormal hypermetabolism within the abdomen and pelvis.  Osseous structures and proximal thighs unremarkable.      Impression: Overall progression of involvement in left upper lobe reticular opacifications and soft tissue mixed density of intense hypermetabolism along with increased activity within index mediastinal nodes right lower lobe pulmonary nodule and right supra clavicular adenopathy demonstrating progressive disease involvement without abnormal hypermetabolism below the diaphragm.  D:  11/10/2020 E:  11/10/2020    This report was finalized on 11/11/2020 12:04 PM by Dr. Tanner Fairbanks.    (  ASSESSMENT: The patient is a very pleasant 65 y.o. with metastatic adenocarcinoma of the lung    PROBLEM LIST:  1. Non-small cell lung cancer originally diagnosed in 2009 status post right upper lobectomy done by Dr. Real in 2009.  2. Left upper lobe lung nodules found on surveillance  CT in 2012, status post left upper lobe wedge resection with benign pathology.  3.  Left lung cancer, adenocarcinoma:  A.  Presented as a new left lower lobe lung nodule found on chest x-ray done 6/2016.   B. CT scan of the chest done 6/16/2016 showing interval enlargement of left lung nodules. PET CT showing hypermetabolic activity in the left lower lobe nodule.   C. CT guided biopsy of left lower lobe nodule positive for adenocarcinoma of the lung. [id number], stage Ia.  C. Status post CyberKnife therapy completed 9/29/2016 per Dr. Lesia CHÁVEZ.  Repeated PET scan done on October 2019 revealed increase in size as well as hypermetabolic activity left upper lobe lung nodule with a new hypermetabolic active right supraclavicular lymph node and essentially other stable findings.  E.  Status post bronchoscopy with biopsy done by Dr. You November 6, 2019 revealed adenocarcinoma from level 4 left and suspicious cytology from left upper lobe.  F.  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  No enough tissue to do PDL 1 testing.  G.  Started carbo Alimta and Keytruda December 04, 2019, status post 4 cycle  H. Started maintenance Alimta/Keytruda on 2/28/2020 status post 12 cycles.   4.  Hemoptysis:  A.  Status post venous embolization done by Dr. Camp November 7, 2019  B.  Completed by radiation to left upper lobe mass November 26, 2019  4.  Black lung  5. Sarcoidosis  6. Hypothyroidism  7.  Conjunctivitis  8.  Treatment induced nausea  9. Seasonal allergies    PLAN:  1. We will proceed today using Alimta/Keytruda cycle #13 as planned.   2. We will see the patient back in 3 weeks for maintenance regimen using Alimta/Keytruda cycle #14.    3. We will continue to monitor the patient's labs throughout treatment including blood counts, kidney function, thyroid function, and liver functions.   4.  We reviewed again the potential side effects of immunotherapy including but not limited to  immune mediated reactions with thyroiditis, pneumonitis, hepatitis, colitis, rash, and electrolytes abnormalities, fatigue, multiorgan failure, and possibly death.  5. We reviewed again the potential side effects of this regimen including fatigue, vomiting and nausea, hair loss, nephropathy, neuropathy, hearing loss, myelosuppression, and risk of infusion reaction.  6.  I did go over the PET scan results with the patient and reviewed the films myself and I went over the pictures with him.  We will compare the current PET scan to previous study done August 20, 2020.  There is slight increase in activity some of the lesions but nothing is a new.  It is hard to tell if this is true progression versus inflammation from immunotherapy versus black lung.  I will discussed the patient case at tumor board next week.  We will either continue same treatment using Keytruda with Alimta versus switch to Keytruda plus spiculated IL-15 on a clinical trial protocol.  7.  The patient will continue Mucinex 600 mg take by mouth twice per day as needed for congestion and cough.   8. He will continue levothyroxine 75 mcg for hypothyroidism. We will adjust his dose if needed for fluctuations in TSH.   9.  We will continue Zofran as needed for chemotherapy-induced nausea.  10.  We will continue folic acid, vitamin B12, and Decadron per Alimta protocol.  11. The patient will continue Miralax, Senakot, and Lactulose for constipation.   12.  He will continue saline eye drops as well as Pataday drops for conjunctivitis.  13.  I will continue Magic Mouthwash to use as needed for treatment induced sore throat.   Padmaja Denny MD  11/13/2020

## 2020-11-13 ENCOUNTER — HOSPITAL ENCOUNTER (OUTPATIENT)
Dept: ONCOLOGY | Facility: HOSPITAL | Age: 65
Setting detail: INFUSION SERIES
Discharge: HOME | End: 2020-11-13

## 2020-11-13 ENCOUNTER — OFFICE VISIT (OUTPATIENT)
Dept: ONCOLOGY | Facility: CLINIC | Age: 65
End: 2020-11-13

## 2020-11-13 VITALS
RESPIRATION RATE: 16 BRPM | DIASTOLIC BLOOD PRESSURE: 73 MMHG | OXYGEN SATURATION: 94 % | TEMPERATURE: 97.3 F | BODY MASS INDEX: 29.49 KG/M2 | HEIGHT: 65 IN | WEIGHT: 177 LBS | SYSTOLIC BLOOD PRESSURE: 148 MMHG | HEART RATE: 91 BPM

## 2020-11-13 DIAGNOSIS — C34.12 MALIGNANT NEOPLASM OF UPPER LOBE OF LEFT LUNG (HCC): Primary | ICD-10-CM

## 2020-11-13 DIAGNOSIS — C34.32 MALIGNANT NEOPLASM OF LOWER LOBE OF LEFT LUNG (HCC): Primary | ICD-10-CM

## 2020-11-13 PROCEDURE — 99215 OFFICE O/P EST HI 40 MIN: CPT | Performed by: INTERNAL MEDICINE

## 2020-11-13 PROCEDURE — 96411 CHEMO IV PUSH ADDL DRUG: CPT

## 2020-11-13 PROCEDURE — 25010000002 PEMETREXED 100 MG RECONSTITUTED SOLUTION 100 MG VIAL: Performed by: INTERNAL MEDICINE

## 2020-11-13 PROCEDURE — 96409 CHEMO IV PUSH SNGL DRUG: CPT

## 2020-11-13 PROCEDURE — 25010000002 PEMBROLIZUMAB 100 MG/4ML SOLUTION 4 ML VIAL: Performed by: INTERNAL MEDICINE

## 2020-11-13 PROCEDURE — 96413 CHEMO IV INFUSION 1 HR: CPT

## 2020-11-13 PROCEDURE — 25010000002 PEMETREXED PER 10 MG: Performed by: INTERNAL MEDICINE

## 2020-11-13 RX ADMIN — SODIUM CHLORIDE 900 MG: 9 INJECTION, SOLUTION INTRAVENOUS at 12:37

## 2020-11-13 RX ADMIN — SODIUM CHLORIDE 200 MG: 9 INJECTION, SOLUTION INTRAVENOUS at 11:44

## 2020-11-15 ENCOUNTER — MDT ASSESSMENT (OUTPATIENT)
Dept: ONCOLOGY | Facility: CLINIC | Age: 65
End: 2020-11-15

## 2020-12-04 ENCOUNTER — OFFICE VISIT (OUTPATIENT)
Dept: ONCOLOGY | Facility: CLINIC | Age: 65
End: 2020-12-04

## 2020-12-04 ENCOUNTER — HOSPITAL ENCOUNTER (OUTPATIENT)
Dept: ONCOLOGY | Facility: HOSPITAL | Age: 65
Setting detail: INFUSION SERIES
Discharge: HOME | End: 2020-12-04

## 2020-12-04 VITALS
HEART RATE: 87 BPM | DIASTOLIC BLOOD PRESSURE: 74 MMHG | WEIGHT: 181 LBS | SYSTOLIC BLOOD PRESSURE: 172 MMHG | TEMPERATURE: 98.6 F | BODY MASS INDEX: 30.16 KG/M2 | RESPIRATION RATE: 16 BRPM | HEIGHT: 65 IN | OXYGEN SATURATION: 93 %

## 2020-12-04 DIAGNOSIS — C34.12 MALIGNANT NEOPLASM OF UPPER LOBE OF LEFT LUNG (HCC): Primary | ICD-10-CM

## 2020-12-04 LAB
ALBUMIN SERPL-MCNC: 4.2 G/DL (ref 3.5–5.2)
ALBUMIN/GLOB SERPL: 1.4 G/DL
ALP SERPL-CCNC: 107 U/L (ref 39–117)
ALT SERPL W P-5'-P-CCNC: 15 U/L (ref 1–41)
ANION GAP SERPL CALCULATED.3IONS-SCNC: 14 MMOL/L (ref 5–15)
AST SERPL-CCNC: 22 U/L (ref 1–40)
BILIRUB SERPL-MCNC: 0.3 MG/DL (ref 0–1.2)
BUN SERPL-MCNC: 17 MG/DL (ref 8–23)
BUN/CREAT SERPL: 13.6 (ref 7–25)
CALCIUM SPEC-SCNC: 9.8 MG/DL (ref 8.6–10.5)
CHLORIDE SERPL-SCNC: 101 MMOL/L (ref 98–107)
CO2 SERPL-SCNC: 22 MMOL/L (ref 22–29)
CREAT BLDA-MCNC: 1.4 MG/DL (ref 0.6–1.3)
CREAT SERPL-MCNC: 1.25 MG/DL (ref 0.76–1.27)
ERYTHROCYTE [DISTWIDTH] IN BLOOD BY AUTOMATED COUNT: 16.8 % (ref 12.3–15.4)
GFR SERPL CREATININE-BSD FRML MDRD: 58 ML/MIN/1.73
GLOBULIN UR ELPH-MCNC: 2.9 GM/DL
GLUCOSE SERPL-MCNC: 139 MG/DL (ref 65–99)
HCT VFR BLD AUTO: 37.4 % (ref 37.5–51)
HGB BLD-MCNC: 12 G/DL (ref 13–17.7)
LYMPHOCYTES # BLD AUTO: 0.5 10*3/MM3 (ref 0.7–3.1)
LYMPHOCYTES NFR BLD AUTO: 4.6 % (ref 19.6–45.3)
MCH RBC QN AUTO: 31.3 PG (ref 26.6–33)
MCHC RBC AUTO-ENTMCNC: 32.1 G/DL (ref 31.5–35.7)
MCV RBC AUTO: 97.6 FL (ref 79–97)
MONOCYTES # BLD AUTO: 0.5 10*3/MM3 (ref 0.1–0.9)
MONOCYTES NFR BLD AUTO: 4 % (ref 5–12)
NEUTROPHILS NFR BLD AUTO: 10.8 10*3/MM3 (ref 1.7–7)
NEUTROPHILS NFR BLD AUTO: 91.4 % (ref 42.7–76)
PLATELET # BLD AUTO: 351 10*3/MM3 (ref 140–450)
PMV BLD AUTO: 6.9 FL (ref 6–12)
POTASSIUM SERPL-SCNC: 4.2 MMOL/L (ref 3.5–5.2)
PROT SERPL-MCNC: 7.1 G/DL (ref 6–8.5)
RBC # BLD AUTO: 3.83 10*6/MM3 (ref 4.14–5.8)
SODIUM SERPL-SCNC: 137 MMOL/L (ref 136–145)
WBC # BLD AUTO: 11.8 10*3/MM3 (ref 3.4–10.8)

## 2020-12-04 PROCEDURE — 85025 COMPLETE CBC W/AUTO DIFF WBC: CPT | Performed by: NURSE PRACTITIONER

## 2020-12-04 PROCEDURE — 96409 CHEMO IV PUSH SNGL DRUG: CPT

## 2020-12-04 PROCEDURE — 96411 CHEMO IV PUSH ADDL DRUG: CPT

## 2020-12-04 PROCEDURE — 96413 CHEMO IV INFUSION 1 HR: CPT

## 2020-12-04 PROCEDURE — 25010000002 CYANOCOBALAMIN PER 1000 MCG: Performed by: NURSE PRACTITIONER

## 2020-12-04 PROCEDURE — 25010000002 PEMETREXED 100 MG RECONSTITUTED SOLUTION 100 MG VIAL: Performed by: NURSE PRACTITIONER

## 2020-12-04 PROCEDURE — 25010000002 PEMBROLIZUMAB 100 MG/4ML SOLUTION 4 ML VIAL: Performed by: NURSE PRACTITIONER

## 2020-12-04 PROCEDURE — 25010000002 PEMETREXED PER 10 MG: Performed by: NURSE PRACTITIONER

## 2020-12-04 PROCEDURE — 99214 OFFICE O/P EST MOD 30 MIN: CPT | Performed by: NURSE PRACTITIONER

## 2020-12-04 PROCEDURE — 96372 THER/PROPH/DIAG INJ SC/IM: CPT

## 2020-12-04 PROCEDURE — 82565 ASSAY OF CREATININE: CPT

## 2020-12-04 PROCEDURE — 80053 COMPREHEN METABOLIC PANEL: CPT | Performed by: NURSE PRACTITIONER

## 2020-12-04 RX ORDER — CYANOCOBALAMIN 1000 UG/ML
1000 INJECTION, SOLUTION INTRAMUSCULAR; SUBCUTANEOUS ONCE
Status: CANCELLED | OUTPATIENT
Start: 2020-12-04

## 2020-12-04 RX ORDER — SODIUM CHLORIDE 9 MG/ML
250 INJECTION, SOLUTION INTRAVENOUS ONCE
Status: CANCELLED | OUTPATIENT
Start: 2020-12-04

## 2020-12-04 RX ORDER — CYANOCOBALAMIN 1000 UG/ML
1000 INJECTION, SOLUTION INTRAMUSCULAR; SUBCUTANEOUS ONCE
Status: COMPLETED | OUTPATIENT
Start: 2020-12-04 | End: 2020-12-04

## 2020-12-04 RX ADMIN — SODIUM CHLORIDE 200 MG: 9 INJECTION, SOLUTION INTRAVENOUS at 10:41

## 2020-12-04 RX ADMIN — CYANOCOBALAMIN 1000 MCG: 1000 INJECTION, SOLUTION INTRAMUSCULAR; SUBCUTANEOUS at 11:14

## 2020-12-04 RX ADMIN — SODIUM CHLORIDE 900 MG: 9 INJECTION, SOLUTION INTRAVENOUS at 11:31

## 2020-12-04 NOTE — PROGRESS NOTES
DATE OF VISIT: 12/4/2020    REASON FOR VISIT: Recurrent adenocarcinoma of the lung       HISTORY OF PRESENT ILLNESS: The patient is a very pleasant 65 y.o. male  with past medical history significant for lung cancer diagnosed 6/22/2016 . 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 PET scan done 6/22/2016 showed findings consistent with recurrent neoplasm in the left upper lobe, with metastatic adenopathy to the left hilum and AP window as well as a metastatic nodule in the left lower lobe. CT-guided biopsy of the left lower lobe nodule was performed that showed adenocarcinoma of the lung. The patient underwent CyberKnife therapy by Dr Pinzon completed 9/29/2016.  Repeat scan showed gradual increase in size of left upper lobe lung infiltrates.  Patient had whole-body PET scan that revealed increased metabolic activity in the left lung infiltrate as well as a new right supraclavicular lymph nodes.  Patient had bronchoscopy with biopsy done by Dr. You on November 6, 2019 that revealed adenocarcinoma and level 4 left mediastinal lymph node.  He received palliative course of radiation to the left upper lobe secondary to persistent hemoptysis followed by palliative treatment with carboplatin Alimta and Keytruda started December 4, 2019.  Completed 4 cycles on February 7, 2020.  The patient was started on Alimta with Keytruda on February 28, 2020.  The patient is here today for scheduled follow up visit with treatment cycle #13.     SUBJECTIVE: The patient is here today by himself.  He is doing well.  His breathing is stable.  He has mild fatigue.  He does have on and off sore throat. The sore throat lasted approximately 2 weeks this past cycle.  He started Theraflu and states his cough and sore throat are better.        PAST MEDICAL HISTORY/SOCIAL HISTORY/FAMILY HISTORY: Unchanged from my prior documentation done on 08/02/2016.      Review of Systems   Constitutional: Negative for activity change, appetite change, chills, fatigue, fever and unexpected weight change.   HENT: Positive for sore throat. Negative for hearing loss, mouth sores, nosebleeds, rhinorrhea, sneezing and trouble swallowing.    Eyes: Negative for itching and visual disturbance.        Tearing   Respiratory: Positive for cough and shortness of breath. Negative for chest tightness and wheezing.         With activity   Cardiovascular: Negative for chest pain, palpitations and leg swelling.   Gastrointestinal: Positive for nausea. Negative for abdominal distention, abdominal pain, blood in stool, constipation, diarrhea, rectal pain and vomiting.   Endocrine: Negative for cold intolerance and heat intolerance.   Genitourinary: Negative for difficulty urinating, dysuria, frequency and urgency.   Musculoskeletal: Negative for arthralgias, back pain, gait problem, joint swelling and myalgias.   Skin: Negative for rash.   Neurological: Negative for dizziness, tremors, syncope, weakness, light-headedness, numbness and headaches.   Hematological: Negative for adenopathy. Does not bruise/bleed easily.   Psychiatric/Behavioral: Negative for confusion, sleep disturbance and suicidal ideas. The patient is not nervous/anxious.          Current Outpatient Medications:   •  albuterol (PROVENTIL HFA;VENTOLIN HFA) 108 (90 BASE) MCG/ACT inhaler, Inhale 2 puffs every 4 (four) hours as needed for wheezing or shortness of air., Disp: , Rfl:   •  ciprofloxacin (CIPRO) 500 MG tablet, Take 500 mg by mouth 2 (Two) Times a Day., Disp: , Rfl:   •  dexamethasone (DECADRON) 4 MG tablet, Take 1 tablet twice daily starting the day before chemo, day of chemo, and day after chemo.  Take with food., Disp: 30 tablet, Rfl: 3  •  dexamethasone (DECADRON) 4 MG tablet, Take 1 tablet twice daily starting the day before chemo, day of chemo, and day after chemo.  Take with food., Disp: 30 tablet, Rfl: 5  •  folic  "acid (FOLVITE) 1 MG tablet, Take 1 tablet by mouth Daily. Start at least 7 days prior to chemotherapy until at least 3 weeks after all chemotherapy., Disp: 30 tablet, Rfl: 5  •  guaiFENesin (MUCINEX) 600 MG 12 hr tablet, Take 1 tablet by mouth 2 (Two) Times a Day., Disp: 60 tablet, Rfl: 5  •  HYDROcodone-acetaminophen (NORCO) 7.5-325 MG per tablet, Take 1 tablet by mouth every 6 (six) hours as needed for moderate pain (4-6)., Disp: , Rfl:   •  INCRUSE ELLIPTA 62.5 MCG/INH aerosol powder , Inhale 1 puff Daily., Disp: , Rfl:   •  lactulose (CHRONULAC) 10 GM/15ML solution, Take 30 mL by mouth 3 (Three) Times a Day. PRN constipation, Disp: 240 mL, Rfl: 2  •  levocetirizine (XYZAL) 5 MG tablet, Take 5 mg by mouth Every Evening., Disp: , Rfl:   •  levothyroxine (SYNTHROID, LEVOTHROID) 75 MCG tablet, Take 75 mcg by mouth Daily., Disp: , Rfl:   •  Lidocaine Viscous HCl (XYLOCAINE) 2 % solution, , Disp: , Rfl:   •  magic mouthwash oral suspension, Swish and spit or swallow 5-10 mL 4 (Four) Times a Day As Needed., Disp: 240 mL, Rfl: 3  •  omeprazole (priLOSEC) 40 MG capsule, Take 40 mg by mouth Daily., Disp: , Rfl:   •  ondansetron (ZOFRAN) 8 MG tablet, Take 1 tablet by mouth 3 (Three) Times a Day As Needed for Nausea or Vomiting., Disp: 30 tablet, Rfl: 5  •  promethazine (PHENERGAN) 25 MG tablet, , Disp: , Rfl:   •  SYMBICORT 160-4.5 MCG/ACT inhaler, INL 2 PUFFS PO BID, Disp: , Rfl: 3  •  tamsulosin (FLOMAX) 0.4 MG capsule 24 hr capsule, Take 1 capsule by mouth Daily. Pt will need to contact PCP for further refills., Disp: 30 capsule, Rfl: 0    PHYSICAL EXAMINATION:   /74   Pulse 87   Temp 98.6 °F (37 °C)   Resp 16   Ht 163.8 cm (64.5\")   Wt 82.1 kg (181 lb)   SpO2 93%   BMI 30.59 kg/m²    Pain Score    12/04/20 0910   PainSc: 0-No pain      ECOG Performance Status: 1 - Symptomatic but completely ambulatory  General Appearance:  alert, cooperative, no apparent distress and appears stated age "   Neurologic/Psychiatric: A&O x 3, gait steady, appropriate affect, strength 5/5 in all muscle groups   HEENT:  Normocephalic, without obvious abnormality, mucous membranes moist   Neck: Supple, symmetrical, trachea midline, no adenopathy;  No thyromegaly, masses, or tenderness   Lungs:   Scattered expiratory wheezes throughout; respirations regular, even, and unlabored bilaterally   Heart:  Regular rate and rhythm, no murmurs appreciated   Abdomen:   Soft, non-tender, non-distended and no organomegaly   Lymph nodes: No cervical, supraclavicular, inguinal or axillary adenopathy noted   Extremities: Normal, atraumatic; no clubbing, cyanosis, or edema    Skin: No rashes, ulcers, or suspicious lesions noted     No visits with results within 2 Week(s) from this visit.   Latest known visit with results is:   Hospital Outpatient Visit on 11/10/2020   Component Date Value Ref Range Status   • Glucose 11/10/2020 103  70 - 130 mg/dL Final        Nm Pet Skull Base To Mid Thigh    Result Date: 11/11/2020  Narrative: EXAMINATION: NM PET SKULL BASE TO MID THIGH- 11/10/2020  INDICATION: Lung cancer restaging; C34.32-Malignant neoplasm of lower lobe, left bronchus or lung; C34.12-Malignant neoplasm of upper lobe, left bronchus or lung  TECHNIQUE: Fasting blood glucose 103 mg/dL. Radiopharmaceutical injection 12.47 mCi of F-18 FDG injected into a right AC vein with patient imaged after appropriate amount of time.  The radiation dose reduction device was turned on for each scan per the ALARA (As Low as Reasonably Achievable) protocol.  COMPARISON: PET/CT dated 08/20/2020  FINDINGS:  HEAD AND NECK: Grossly symmetric appearance of cerebral hemispheres on limited intracranial evaluation. Physiologic variant activity within the extraocular muscles, muscles of phonation and tonsils. Right supraclavicular adenopathy with maximum SUV 4.3 previously 3.1 increased  CHEST: Physiologic activity within the left ventricular myocardium. Right  lower lobe pulmonary nodule has abnormal hypermetabolism maximum SUV 3.0 previously 2.5 increased in the interim with nodular focus in the left midlung near the periphery remain non hypermetabolic at 1.9. Mediastinal and hilar adenopathy including prevascular lymph node involvement and AP window index node maximum SUV 3.6 previously measuring 3.2 although which increased in the interim. Intense hypermetabolic activity left upper lung with scattered opacifications and pleural involvement of at least some abutting regions maximum SUV 8.4 previously 5.8 of progressive involvement  ABDOMEN AND PELVIS: Liver, spleen, renal collecting systems and GI tract without abnormal hypermetabolism demonstrate normal physiologic activity. No abnormal hypermetabolism within the abdomen and pelvis.  Osseous structures and proximal thighs unremarkable.      Impression: Overall progression of involvement in left upper lobe reticular opacifications and soft tissue mixed density of intense hypermetabolism along with increased activity within index mediastinal nodes right lower lobe pulmonary nodule and right supra clavicular adenopathy demonstrating progressive disease involvement without abnormal hypermetabolism below the diaphragm.  D:  11/10/2020 E:  11/10/2020    This report was finalized on 11/11/2020 12:04 PM by Dr. Tanner Fairbanks.    (  ASSESSMENT: The patient is a very pleasant 65 y.o. with metastatic adenocarcinoma of the lung    PROBLEM LIST:  1. Non-small cell lung cancer originally diagnosed in 2009 status post right upper lobectomy done by Dr. Real in 2009.  2. Left upper lobe lung nodules found on surveillance CT in 2012, status post left upper lobe wedge resection with benign pathology.  3.  Left lung cancer, adenocarcinoma:  A.  Presented as a new left lower lobe lung nodule found on chest x-ray done 6/2016.   B. CT scan of the chest done 6/16/2016 showing interval enlargement of left lung nodules. PET CT showing  hypermetabolic activity in the left lower lobe nodule.   C. CT guided biopsy of left lower lobe nodule positive for adenocarcinoma of the lung. [id number], stage Ia.  C. Status post CyberKnife therapy completed 9/29/2016 per Dr. Lesia Pinzon  D.  Repeated PET scan done on October 2019 revealed increase in size as well as hypermetabolic activity left upper lobe lung nodule with a new hypermetabolic active right supraclavicular lymph node and essentially other stable findings.  E.  Status post bronchoscopy with biopsy done by Dr. You November 6, 2019 revealed adenocarcinoma from level 4 left and suspicious cytology from left upper lobe.  F.  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  No enough tissue to do PDL 1 testing.  G.  Started carbo Alimta and Keytruda December 04, 2019, status post 4 cycle  H. Started maintenance Alimta/Keytruda on 2/28/2020 status post 13 cycles.   4.  Hemoptysis:  A.  Status post venous embolization done by Dr. Camp November 7, 2019  B.  Completed by radiation to left upper lobe mass November 26, 2019  4.  Black lung  5. Sarcoidosis  6. Hypothyroidism  7.  Conjunctivitis  8.  Treatment induced nausea  9. Seasonal allergies    PLAN:  1. We will proceed today using Alimta/Keytruda cycle #14 as planned.   2. We will see the patient back in 3 weeks for maintenance regimen using Alimta/Keytruda cycle #15.    3. We will continue to monitor the patient's labs throughout treatment including blood counts, kidney function, thyroid function, and liver functions.   4.  We reviewed again the potential side effects of immunotherapy including but not limited to immune mediated reactions with thyroiditis, pneumonitis, hepatitis, colitis, rash, and electrolytes abnormalities, fatigue, multiorgan failure, and possibly death.  5. We reviewed again the potential side effects of this regimen including fatigue, vomiting and nausea, hair loss, nephropathy, neuropathy, hearing  loss, myelosuppression, and risk of infusion reaction.  6. Dr. Denny discussed the patient case at tumor board and the decision was made to continue same treatment using Keytruda with Alimta.   7.  The patient will continue Mucinex 600 mg take by mouth twice per day as needed for congestion and cough.   8. He will continue levothyroxine 75 mcg for hypothyroidism. We will adjust his dose if needed for fluctuations in TSH.   9.  We will continue Zofran as needed for chemotherapy-induced nausea.  10.  We will continue folic acid, vitamin B12, and Decadron per Alimta protocol.  11. The patient will continue Miralax, Senakot, and Lactulose for constipation.   12.  He will continue saline eye drops as well as Pataday drops for conjunctivitis.  13.  I will continue Magic Mouthwash to use as needed for treatment induced sore throat.  Refill sent to pharmacy.   Smiley Dye, APRN  12/4/2020

## 2020-12-28 ENCOUNTER — HOSPITAL ENCOUNTER (OUTPATIENT)
Dept: ONCOLOGY | Facility: HOSPITAL | Age: 65
Setting detail: INFUSION SERIES
Discharge: HOME | End: 2020-12-28

## 2020-12-28 ENCOUNTER — OFFICE VISIT (OUTPATIENT)
Dept: ONCOLOGY | Facility: CLINIC | Age: 65
End: 2020-12-28

## 2020-12-28 VITALS
HEART RATE: 99 BPM | SYSTOLIC BLOOD PRESSURE: 166 MMHG | OXYGEN SATURATION: 90 % | BODY MASS INDEX: 31.58 KG/M2 | WEIGHT: 185 LBS | RESPIRATION RATE: 18 BRPM | TEMPERATURE: 98 F | HEIGHT: 64 IN | DIASTOLIC BLOOD PRESSURE: 78 MMHG

## 2020-12-28 DIAGNOSIS — C34.32 MALIGNANT NEOPLASM OF LOWER LOBE OF LEFT LUNG (HCC): Primary | ICD-10-CM

## 2020-12-28 DIAGNOSIS — C34.12 MALIGNANT NEOPLASM OF UPPER LOBE OF LEFT LUNG (HCC): Primary | ICD-10-CM

## 2020-12-28 DIAGNOSIS — C34.12 MALIGNANT NEOPLASM OF UPPER LOBE OF LEFT LUNG (HCC): ICD-10-CM

## 2020-12-28 LAB
ALBUMIN SERPL-MCNC: 3.8 G/DL (ref 3.5–5.2)
ALBUMIN/GLOB SERPL: 1.2 G/DL
ALP SERPL-CCNC: 110 U/L (ref 39–117)
ALT SERPL W P-5'-P-CCNC: 11 U/L (ref 1–41)
ANION GAP SERPL CALCULATED.3IONS-SCNC: 12 MMOL/L (ref 5–15)
AST SERPL-CCNC: 21 U/L (ref 1–40)
BILIRUB SERPL-MCNC: 0.2 MG/DL (ref 0–1.2)
BUN SERPL-MCNC: 11 MG/DL (ref 8–23)
BUN/CREAT SERPL: 8.5 (ref 7–25)
CALCIUM SPEC-SCNC: 9.2 MG/DL (ref 8.6–10.5)
CHLORIDE SERPL-SCNC: 104 MMOL/L (ref 98–107)
CO2 SERPL-SCNC: 25 MMOL/L (ref 22–29)
CREAT BLDA-MCNC: 1.4 MG/DL (ref 0.6–1.3)
CREAT SERPL-MCNC: 1.29 MG/DL (ref 0.76–1.27)
CREAT SERPL-MCNC: 1.4 MG/DL
ERYTHROCYTE [DISTWIDTH] IN BLOOD BY AUTOMATED COUNT: 17.1 % (ref 12.3–15.4)
GFR SERPL CREATININE-BSD FRML MDRD: 56 ML/MIN/1.73
GLOBULIN UR ELPH-MCNC: 3.2 GM/DL
GLUCOSE SERPL-MCNC: 128 MG/DL (ref 65–99)
HCT VFR BLD AUTO: 34.5 % (ref 37.5–51)
HGB BLD-MCNC: 11.4 G/DL (ref 13–17.7)
LYMPHOCYTES # BLD AUTO: 0.6 10*3/MM3 (ref 0.7–3.1)
LYMPHOCYTES NFR BLD AUTO: 6.8 % (ref 19.6–45.3)
MCH RBC QN AUTO: 32 PG (ref 26.6–33)
MCHC RBC AUTO-ENTMCNC: 33.1 G/DL (ref 31.5–35.7)
MCV RBC AUTO: 96.7 FL (ref 79–97)
MONOCYTES # BLD AUTO: 0.5 10*3/MM3 (ref 0.1–0.9)
MONOCYTES NFR BLD AUTO: 5.1 % (ref 5–12)
NEUTROPHILS NFR BLD AUTO: 8.1 10*3/MM3 (ref 1.7–7)
NEUTROPHILS NFR BLD AUTO: 88.1 % (ref 42.7–76)
PLATELET # BLD AUTO: 409 10*3/MM3 (ref 140–450)
PMV BLD AUTO: 6.9 FL (ref 6–12)
POTASSIUM SERPL-SCNC: 3.9 MMOL/L (ref 3.5–5.2)
PROT SERPL-MCNC: 7 G/DL (ref 6–8.5)
RBC # BLD AUTO: 3.56 10*6/MM3 (ref 4.14–5.8)
SODIUM SERPL-SCNC: 141 MMOL/L (ref 136–145)
T4 FREE SERPL-MCNC: 1.15 NG/DL (ref 0.93–1.7)
TSH SERPL DL<=0.05 MIU/L-ACNC: 1.3 UIU/ML (ref 0.27–4.2)
WBC # BLD AUTO: 9.2 10*3/MM3 (ref 3.4–10.8)

## 2020-12-28 PROCEDURE — 85025 COMPLETE CBC W/AUTO DIFF WBC: CPT | Performed by: INTERNAL MEDICINE

## 2020-12-28 PROCEDURE — 84439 ASSAY OF FREE THYROXINE: CPT | Performed by: INTERNAL MEDICINE

## 2020-12-28 PROCEDURE — 82565 ASSAY OF CREATININE: CPT

## 2020-12-28 PROCEDURE — 96411 CHEMO IV PUSH ADDL DRUG: CPT

## 2020-12-28 PROCEDURE — 25010000002 PEMBROLIZUMAB 100 MG/4ML SOLUTION 4 ML VIAL: Performed by: INTERNAL MEDICINE

## 2020-12-28 PROCEDURE — 84443 ASSAY THYROID STIM HORMONE: CPT | Performed by: INTERNAL MEDICINE

## 2020-12-28 PROCEDURE — 99215 OFFICE O/P EST HI 40 MIN: CPT | Performed by: INTERNAL MEDICINE

## 2020-12-28 PROCEDURE — 25010000002 PEMETREXED 100 MG RECONSTITUTED SOLUTION 100 MG VIAL: Performed by: INTERNAL MEDICINE

## 2020-12-28 PROCEDURE — 25010000002 PEMETREXED PER 10 MG: Performed by: INTERNAL MEDICINE

## 2020-12-28 PROCEDURE — 80053 COMPREHEN METABOLIC PANEL: CPT | Performed by: INTERNAL MEDICINE

## 2020-12-28 PROCEDURE — 96413 CHEMO IV INFUSION 1 HR: CPT

## 2020-12-28 RX ORDER — SODIUM CHLORIDE 9 MG/ML
250 INJECTION, SOLUTION INTRAVENOUS ONCE
Status: CANCELLED | OUTPATIENT
Start: 2020-12-28

## 2020-12-28 RX ORDER — SODIUM CHLORIDE 9 MG/ML
250 INJECTION, SOLUTION INTRAVENOUS ONCE
Status: DISCONTINUED | OUTPATIENT
Start: 2020-12-28 | End: 2020-12-29 | Stop reason: HOSPADM

## 2020-12-28 RX ADMIN — SODIUM CHLORIDE 200 MG: 9 INJECTION, SOLUTION INTRAVENOUS at 13:15

## 2020-12-28 RX ADMIN — SODIUM CHLORIDE 900 MG: 9 INJECTION, SOLUTION INTRAVENOUS at 13:58

## 2020-12-28 NOTE — PROGRESS NOTES
DATE OF VISIT: 12/28/2020    REASON FOR VISIT: Recurrent adenocarcinoma of the lung       HISTORY OF PRESENT ILLNESS: The patient is a very pleasant 65 y.o. male  with past medical history significant for lung cancer diagnosed 6/22/2016 . 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 PET scan done 6/22/2016 showed findings consistent with recurrent neoplasm in the left upper lobe, with metastatic adenopathy to the left hilum and AP window as well as a metastatic nodule in the left lower lobe. CT-guided biopsy of the left lower lobe nodule was performed that showed adenocarcinoma of the lung. The patient underwent CyberKnife therapy by Dr Pinzon completed 9/29/2016.  Repeat scan showed gradual increase in size of left upper lobe lung infiltrates.  Patient had whole-body PET scan that revealed increased metabolic activity in the left lung infiltrate as well as a new right supraclavicular lymph nodes.  Patient had bronchoscopy with biopsy done by Dr. You on November 6, 2019 that revealed adenocarcinoma and level 4 left mediastinal lymph node.  He received palliative course of radiation to the left upper lobe secondary to persistent hemoptysis followed by palliative treatment with carboplatin Alimta and Keytruda started December 4, 2019.  Completed 4 cycles on February 7, 2020.  The patient was started on Alimta with Keytruda on February 28, 2020.  The patient is here today for scheduled follow up visit with treatment cycle #14.     SUBJECTIVE: The patient is here today by himself.    He is doing fairly well.  He has not used steroids for short period of time secondary to shortness of breath.      PAST MEDICAL HISTORY/SOCIAL HISTORY/FAMILY HISTORY: Unchanged from my prior documentation done on 08/02/2016.     Review of Systems   Constitutional: Negative for activity change, appetite change, chills, fatigue, fever and unexpected  weight change.   HENT: Positive for sore throat. Negative for hearing loss, mouth sores, nosebleeds, rhinorrhea, sneezing and trouble swallowing.    Eyes: Negative for itching and visual disturbance.        Tearing   Respiratory: Positive for cough and shortness of breath. Negative for chest tightness and wheezing.         With activity   Cardiovascular: Negative for chest pain, palpitations and leg swelling.   Gastrointestinal: Positive for nausea. Negative for abdominal distention, abdominal pain, blood in stool, constipation, diarrhea, rectal pain and vomiting.   Endocrine: Negative for cold intolerance and heat intolerance.   Genitourinary: Negative for difficulty urinating, dysuria, frequency and urgency.   Musculoskeletal: Negative for arthralgias, back pain, gait problem, joint swelling and myalgias.   Skin: Negative for rash.   Neurological: Negative for dizziness, tremors, syncope, weakness, light-headedness, numbness and headaches.   Hematological: Negative for adenopathy. Does not bruise/bleed easily.   Psychiatric/Behavioral: Negative for confusion, sleep disturbance and suicidal ideas. The patient is not nervous/anxious.          Current Outpatient Medications:   •  albuterol (PROVENTIL HFA;VENTOLIN HFA) 108 (90 BASE) MCG/ACT inhaler, Inhale 2 puffs every 4 (four) hours as needed for wheezing or shortness of air., Disp: , Rfl:   •  ciprofloxacin (CIPRO) 500 MG tablet, Take 500 mg by mouth 2 (Two) Times a Day., Disp: , Rfl:   •  dexamethasone (DECADRON) 4 MG tablet, Take 1 tablet twice daily starting the day before chemo, day of chemo, and day after chemo.  Take with food., Disp: 30 tablet, Rfl: 3  •  dexamethasone (DECADRON) 4 MG tablet, Take 1 tablet twice daily starting the day before chemo, day of chemo, and day after chemo.  Take with food., Disp: 30 tablet, Rfl: 5  •  folic acid (FOLVITE) 1 MG tablet, Take 1 tablet by mouth Daily. Start at least 7 days prior to chemotherapy until at least 3 weeks  "after all chemotherapy., Disp: 30 tablet, Rfl: 5  •  guaiFENesin (MUCINEX) 600 MG 12 hr tablet, Take 1 tablet by mouth 2 (Two) Times a Day., Disp: 60 tablet, Rfl: 5  •  HYDROcodone-acetaminophen (NORCO) 7.5-325 MG per tablet, Take 1 tablet by mouth every 6 (six) hours as needed for moderate pain (4-6)., Disp: , Rfl:   •  INCRUSE ELLIPTA 62.5 MCG/INH aerosol powder , Inhale 1 puff Daily., Disp: , Rfl:   •  lactulose (CHRONULAC) 10 GM/15ML solution, Take 30 mL by mouth 3 (Three) Times a Day. PRN constipation, Disp: 240 mL, Rfl: 2  •  levocetirizine (XYZAL) 5 MG tablet, Take 5 mg by mouth Every Evening., Disp: , Rfl:   •  levothyroxine (SYNTHROID, LEVOTHROID) 75 MCG tablet, Take 75 mcg by mouth Daily., Disp: , Rfl:   •  Lidocaine Viscous HCl (XYLOCAINE) 2 % solution, , Disp: , Rfl:   •  magic mouthwash oral suspension, Swish and spit or swallow 5-10 mL 4 (Four) Times a Day As Needed., Disp: 240 mL, Rfl: 3  •  omeprazole (priLOSEC) 40 MG capsule, Take 40 mg by mouth Daily., Disp: , Rfl:   •  ondansetron (ZOFRAN) 8 MG tablet, Take 1 tablet by mouth 3 (Three) Times a Day As Needed for Nausea or Vomiting., Disp: 30 tablet, Rfl: 5  •  promethazine (PHENERGAN) 25 MG tablet, , Disp: , Rfl:   •  SYMBICORT 160-4.5 MCG/ACT inhaler, INL 2 PUFFS PO BID, Disp: , Rfl: 3  •  tamsulosin (FLOMAX) 0.4 MG capsule 24 hr capsule, Take 1 capsule by mouth Daily. Pt will need to contact PCP for further refills., Disp: 30 capsule, Rfl: 0    PHYSICAL EXAMINATION:   /78   Pulse 99   Temp 98 °F (36.7 °C) (Temporal)   Resp 18   Ht 163.8 cm (64.49\")   Wt 83.9 kg (185 lb)   SpO2 90%   BMI 31.28 kg/m²    Pain Score    12/28/20 1048   PainSc: 0-No pain      ECOG Performance Status: 1 - Symptomatic but completely ambulatory  General Appearance:  alert, cooperative, no apparent distress and appears stated age   Neurologic/Psychiatric: A&O x 3, gait steady, appropriate affect, strength 5/5 in all muscle groups   HEENT:  Normocephalic, " without obvious abnormality, mucous membranes moist   Neck: Supple, symmetrical, trachea midline, no adenopathy;  No thyromegaly, masses, or tenderness   Lungs:   Scattered expiratory wheezes throughout; respirations regular, even, and unlabored bilaterally   Heart:  Regular rate and rhythm, no murmurs appreciated   Abdomen:   Soft, non-tender, non-distended and no organomegaly   Lymph nodes: No cervical, supraclavicular, inguinal or axillary adenopathy noted   Extremities: Normal, atraumatic; no clubbing, cyanosis, or edema    Skin: No rashes, ulcers, or suspicious lesions noted     No visits with results within 2 Week(s) from this visit.   Latest known visit with results is:   Hospital Outpatient Visit on 12/04/2020   Component Date Value Ref Range Status   • Glucose 12/04/2020 139* 65 - 99 mg/dL Final   • BUN 12/04/2020 17  8 - 23 mg/dL Final   • Creatinine 12/04/2020 1.25  0.76 - 1.27 mg/dL Final   • Sodium 12/04/2020 137  136 - 145 mmol/L Final   • Potassium 12/04/2020 4.2  3.5 - 5.2 mmol/L Final   • Chloride 12/04/2020 101  98 - 107 mmol/L Final   • CO2 12/04/2020 22.0  22.0 - 29.0 mmol/L Final   • Calcium 12/04/2020 9.8  8.6 - 10.5 mg/dL Final   • Total Protein 12/04/2020 7.1  6.0 - 8.5 g/dL Final   • Albumin 12/04/2020 4.20  3.50 - 5.20 g/dL Final   • ALT (SGPT) 12/04/2020 15  1 - 41 U/L Final   • AST (SGOT) 12/04/2020 22  1 - 40 U/L Final   • Alkaline Phosphatase 12/04/2020 107  39 - 117 U/L Final   • Total Bilirubin 12/04/2020 0.3  0.0 - 1.2 mg/dL Final   • eGFR Non African Amer 12/04/2020 58* >60 mL/min/1.73 Final   • Globulin 12/04/2020 2.9  gm/dL Final   • A/G Ratio 12/04/2020 1.4  g/dL Final   • BUN/Creatinine Ratio 12/04/2020 13.6  7.0 - 25.0 Final   • Anion Gap 12/04/2020 14.0  5.0 - 15.0 mmol/L Final   • WBC 12/04/2020 11.80* 3.40 - 10.80 10*3/mm3 Final   • RBC 12/04/2020 3.83* 4.14 - 5.80 10*6/mm3 Final   • Hemoglobin 12/04/2020 12.0* 13.0 - 17.7 g/dL Final   • Hematocrit 12/04/2020 37.4* 37.5 -  51.0 % Final   • RDW 12/04/2020 16.8* 12.3 - 15.4 % Final   • MCV 12/04/2020 97.6* 79.0 - 97.0 fL Final   • MCH 12/04/2020 31.3  26.6 - 33.0 pg Final   • MCHC 12/04/2020 32.1  31.5 - 35.7 g/dL Final   • MPV 12/04/2020 6.9  6.0 - 12.0 fL Final   • Platelets 12/04/2020 351  140 - 450 10*3/mm3 Final   • Neutrophil % 12/04/2020 91.4* 42.7 - 76.0 % Final   • Lymphocyte % 12/04/2020 4.6* 19.6 - 45.3 % Final   • Monocyte % 12/04/2020 4.0* 5.0 - 12.0 % Final   • Neutrophils, Absolute 12/04/2020 10.80* 1.70 - 7.00 10*3/mm3 Final   • Lymphocytes, Absolute 12/04/2020 0.50* 0.70 - 3.10 10*3/mm3 Final   • Monocytes, Absolute 12/04/2020 0.50  0.10 - 0.90 10*3/mm3 Final   • Creatinine 12/04/2020 1.40* 0.60 - 1.30 mg/dL Final    Serial Number: [id number]:  [id number]        No results found.(  ASSESSMENT: The patient is a very pleasant 65 y.o. with metastatic adenocarcinoma of the lung    PROBLEM LIST:  1. Non-small cell lung cancer originally diagnosed in 2009 status post right upper lobectomy done by Dr. Real in 2009.  2. Left upper lobe lung nodules found on surveillance CT in 2012, status post left upper lobe wedge resection with benign pathology.  3.  Left lung cancer, adenocarcinoma:  A.  Presented as a new left lower lobe lung nodule found on chest x-ray done 6/2016.   B. CT scan of the chest done 6/16/2016 showing interval enlargement of left lung nodules. PET CT showing hypermetabolic activity in the left lower lobe nodule.   C. CT guided biopsy of left lower lobe nodule positive for adenocarcinoma of the lung. [id number], stage Ia.  C. Status post CyberKnife therapy completed 9/29/2016 per Dr. Lesia CHÁVEZ.  Repeated PET scan done on October 2019 revealed increase in size as well as hypermetabolic activity left upper lobe lung nodule with a new hypermetabolic active right supraclavicular lymph node and essentially other stable findings.  E.  Status post bronchoscopy with biopsy done by Dr. You November 6, 2019  revealed adenocarcinoma from level 4 left and suspicious cytology from left upper lobe.  F.  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  No enough tissue to do PDL 1 testing.  G.  Started carbo Alimta and Keytruda December 04, 2019, status post 4 cycle  H. Started maintenance Alimta/Keytruda on 2/28/2020 status post 14 cycles.   4.  Hemoptysis:  A.  Status post venous embolization done by Dr. Camp November 7, 2019  B.  Completed by radiation to left upper lobe mass November 26, 2019  4.  Black lung  5. Sarcoidosis  6. Hypothyroidism  7.  Conjunctivitis  8.  Treatment induced nausea  9. Seasonal allergies    PLAN:  1. We will proceed today using Alimta/Keytruda cycle #15 as planned.   2. We will see the patient back in 3 weeks for maintenance regimen using Alimta/Keytruda cycle #16.    3. We will continue to monitor the patient's labs throughout treatment including blood counts, kidney function, thyroid function, and liver functions.   4.  We reviewed again the potential side effects of immunotherapy including but not limited to immune mediated reactions with thyroiditis, pneumonitis, hepatitis, colitis, rash, and electrolytes abnormalities, fatigue, multiorgan failure, and possibly death.  5. We reviewed again the potential side effects of this regimen including fatigue, vomiting and nausea, hair loss, nephropathy, neuropathy, hearing loss, myelosuppression, and risk of infusion reaction.  6.  I will do 3 months follow-up scans which will be due February 2021.   7.  The patient will continue Mucinex 600 mg take by mouth twice per day as needed for congestion and cough.   8. He will continue levothyroxine 75 mcg for hypothyroidism. We will adjust his dose if needed for fluctuations in TSH.   9.  We will continue Zofran as needed for chemotherapy-induced nausea.  10.  We will continue folic acid, vitamin B12, and Decadron per Alimta protocol.  11. The patient will continue Miralax,  Senakot, and Lactulose for constipation.   12.  He will continue saline eye drops as well as Pataday drops for conjunctivitis.  13.  I will continue Magic Mouthwash to use as needed for treatment induced sore throat.  Refill sent to pharmacy.   Padmaja Denny MD  12/28/2020

## 2021-01-05 ENCOUNTER — TELEPHONE (OUTPATIENT)
Dept: ONCOLOGY | Facility: CLINIC | Age: 66
End: 2021-01-05

## 2021-01-05 NOTE — TELEPHONE ENCOUNTER
Called and let patient spouse know that per Snehal Page, aprn patient can get covid vaccine today as long as pcp checks labs to make sure counts are good and not neutropenic.

## 2021-01-05 NOTE — TELEPHONE ENCOUNTER
Rhina, patient's wife, calling.    Patient's PCP wants approval for patient to get COVID-19 vaccine.  If it's ok with Dr. Denny, patient can receive the vaccine today.    Please call Rhina ortega to let her know - [phone]

## 2021-01-22 ENCOUNTER — OFFICE VISIT (OUTPATIENT)
Dept: ONCOLOGY | Facility: CLINIC | Age: 66
End: 2021-01-22

## 2021-01-22 ENCOUNTER — HOSPITAL ENCOUNTER (OUTPATIENT)
Dept: ONCOLOGY | Facility: HOSPITAL | Age: 66
Setting detail: INFUSION SERIES
Discharge: HOME | End: 2021-01-22

## 2021-01-22 VITALS
HEART RATE: 90 BPM | OXYGEN SATURATION: 92 % | TEMPERATURE: 97.3 F | RESPIRATION RATE: 16 BRPM | BODY MASS INDEX: 31.53 KG/M2 | WEIGHT: 184.7 LBS | SYSTOLIC BLOOD PRESSURE: 172 MMHG | HEIGHT: 64 IN | DIASTOLIC BLOOD PRESSURE: 86 MMHG

## 2021-01-22 DIAGNOSIS — C34.12 MALIGNANT NEOPLASM OF UPPER LOBE OF LEFT LUNG (HCC): Primary | ICD-10-CM

## 2021-01-22 DIAGNOSIS — C34.32 MALIGNANT NEOPLASM OF LOWER LOBE OF LEFT LUNG (HCC): Primary | ICD-10-CM

## 2021-01-22 DIAGNOSIS — C34.12 MALIGNANT NEOPLASM OF UPPER LOBE OF LEFT LUNG (HCC): ICD-10-CM

## 2021-01-22 LAB
ALBUMIN SERPL-MCNC: 3.6 G/DL (ref 3.5–5.2)
ALBUMIN/GLOB SERPL: 1 G/DL
ALP SERPL-CCNC: 99 U/L (ref 39–117)
ALT SERPL W P-5'-P-CCNC: 13 U/L (ref 1–41)
ANION GAP SERPL CALCULATED.3IONS-SCNC: 12 MMOL/L (ref 5–15)
AST SERPL-CCNC: 21 U/L (ref 1–40)
BILIRUB SERPL-MCNC: 0.3 MG/DL (ref 0–1.2)
BUN SERPL-MCNC: 16 MG/DL (ref 8–23)
BUN/CREAT SERPL: 13.8 (ref 7–25)
CALCIUM SPEC-SCNC: 9.1 MG/DL (ref 8.6–10.5)
CHLORIDE SERPL-SCNC: 101 MMOL/L (ref 98–107)
CO2 SERPL-SCNC: 24 MMOL/L (ref 22–29)
CREAT BLDA-MCNC: 1.1 MG/DL (ref 0.6–1.3)
CREAT SERPL-MCNC: 1.16 MG/DL (ref 0.76–1.27)
ERYTHROCYTE [DISTWIDTH] IN BLOOD BY AUTOMATED COUNT: 17.5 % (ref 12.3–15.4)
GFR SERPL CREATININE-BSD FRML MDRD: 63 ML/MIN/1.73
GLOBULIN UR ELPH-MCNC: 3.6 GM/DL
GLUCOSE SERPL-MCNC: 160 MG/DL (ref 65–99)
HCT VFR BLD AUTO: 34.7 % (ref 37.5–51)
HGB BLD-MCNC: 11.3 G/DL (ref 13–17.7)
LYMPHOCYTES # BLD AUTO: 1.1 10*3/MM3 (ref 0.7–3.1)
LYMPHOCYTES NFR BLD AUTO: 8.6 % (ref 19.6–45.3)
MCH RBC QN AUTO: 31.3 PG (ref 26.6–33)
MCHC RBC AUTO-ENTMCNC: 32.5 G/DL (ref 31.5–35.7)
MCV RBC AUTO: 96.3 FL (ref 79–97)
MONOCYTES # BLD AUTO: 0.3 10*3/MM3 (ref 0.1–0.9)
MONOCYTES NFR BLD AUTO: 2.7 % (ref 5–12)
NEUTROPHILS NFR BLD AUTO: 11 10*3/MM3 (ref 1.7–7)
NEUTROPHILS NFR BLD AUTO: 88.7 % (ref 42.7–76)
PLATELET # BLD AUTO: 377 10*3/MM3 (ref 140–450)
PMV BLD AUTO: 6.7 FL (ref 6–12)
POTASSIUM SERPL-SCNC: 3.7 MMOL/L (ref 3.5–5.2)
PROT SERPL-MCNC: 7.2 G/DL (ref 6–8.5)
RBC # BLD AUTO: 3.61 10*6/MM3 (ref 4.14–5.8)
SODIUM SERPL-SCNC: 137 MMOL/L (ref 136–145)
WBC # BLD AUTO: 12.4 10*3/MM3 (ref 3.4–10.8)

## 2021-01-22 PROCEDURE — 80053 COMPREHEN METABOLIC PANEL: CPT | Performed by: NURSE PRACTITIONER

## 2021-01-22 PROCEDURE — 96413 CHEMO IV INFUSION 1 HR: CPT

## 2021-01-22 PROCEDURE — 99214 OFFICE O/P EST MOD 30 MIN: CPT | Performed by: NURSE PRACTITIONER

## 2021-01-22 PROCEDURE — 96409 CHEMO IV PUSH SNGL DRUG: CPT

## 2021-01-22 PROCEDURE — 82565 ASSAY OF CREATININE: CPT

## 2021-01-22 PROCEDURE — 96411 CHEMO IV PUSH ADDL DRUG: CPT

## 2021-01-22 PROCEDURE — 25010000002 PEMBROLIZUMAB 100 MG/4ML SOLUTION 4 ML VIAL: Performed by: NURSE PRACTITIONER

## 2021-01-22 PROCEDURE — 85025 COMPLETE CBC W/AUTO DIFF WBC: CPT | Performed by: NURSE PRACTITIONER

## 2021-01-22 PROCEDURE — 25010000002 PEMETREXED 100 MG RECONSTITUTED SOLUTION 100 MG VIAL: Performed by: NURSE PRACTITIONER

## 2021-01-22 PROCEDURE — 25010000002 PEMETREXED PER 10 MG: Performed by: NURSE PRACTITIONER

## 2021-01-22 RX ORDER — SODIUM CHLORIDE 9 MG/ML
250 INJECTION, SOLUTION INTRAVENOUS ONCE
Status: CANCELLED | OUTPATIENT
Start: 2021-01-22

## 2021-01-22 RX ORDER — DEXAMETHASONE SODIUM PHOSPHATE 1 MG/ML
1 SOLUTION/ DROPS OPHTHALMIC EVERY 6 HOURS
Qty: 5 ML | Refills: 2 | Status: SHIPPED | OUTPATIENT
Start: 2021-01-22

## 2021-01-22 RX ORDER — FLUTICASONE PROPIONATE 50 MCG
2 SPRAY, SUSPENSION (ML) NASAL DAILY
Qty: 1 BOTTLE | Refills: 5 | Status: SHIPPED | OUTPATIENT
Start: 2021-01-22

## 2021-01-22 RX ADMIN — SODIUM CHLORIDE 200 MG: 9 INJECTION, SOLUTION INTRAVENOUS at 11:17

## 2021-01-22 RX ADMIN — SODIUM CHLORIDE 900 MG: 9 INJECTION, SOLUTION INTRAVENOUS at 11:58

## 2021-01-22 NOTE — PROGRESS NOTES
DATE OF VISIT: 1/22/2021    REASON FOR VISIT: Recurrent adenocarcinoma of the lung       HISTORY OF PRESENT ILLNESS: The patient is a very pleasant 65 y.o. male  with past medical history significant for lung cancer diagnosed 6/22/2016 . 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 PET scan done 6/22/2016 showed findings consistent with recurrent neoplasm in the left upper lobe, with metastatic adenopathy to the left hilum and AP window as well as a metastatic nodule in the left lower lobe. CT-guided biopsy of the left lower lobe nodule was performed that showed adenocarcinoma of the lung. The patient underwent CyberKnife therapy by Dr Pinzon completed 9/29/2016.  Repeat scan showed gradual increase in size of left upper lobe lung infiltrates.  Patient had whole-body PET scan that revealed increased metabolic activity in the left lung infiltrate as well as a new right supraclavicular lymph nodes.  Patient had bronchoscopy with biopsy done by Dr. You on November 6, 2019 that revealed adenocarcinoma and level 4 left mediastinal lymph node.  He received palliative course of radiation to the left upper lobe secondary to persistent hemoptysis followed by palliative treatment with carboplatin Alimta and Keytruda started December 4, 2019.  Completed 4 cycles on February 7, 2020.  The patient was started on Alimta with Keytruda on February 28, 2020.  The patient is here today for scheduled follow up visit with treatment cycle #16.     SUBJECTIVE:The patient is here today by himself. He has been doing fairly well. He continues to have some shortness of breath with exertion. He remains very active, but he wonders if he needs to go back to the pulmonologist for evaluation. He also complains of ongoing tearing of his eyes. He is not using any drops currently. He has some sinus congestion that is chronic but seems to improve with his  treatment steroids. He denies fever or chills. He is eating and drinking well. He denies chest pain or palpitations. He does monitor his blood pressure at home and notes it typically runs in the 140's/70's.       PAST MEDICAL HISTORY/SOCIAL HISTORY/FAMILY HISTORY: Unchanged from my prior documentation done on 08/02/2016.     Review of Systems   Constitutional: Positive for fatigue. Negative for activity change, appetite change, chills, fever and unexpected weight change.   HENT: Positive for sinus pressure. Negative for hearing loss, mouth sores, nosebleeds, rhinorrhea, sneezing, sore throat and trouble swallowing.    Eyes: Negative for itching and visual disturbance.        Tearing   Respiratory: Positive for shortness of breath. Negative for cough, chest tightness and wheezing.         With activity   Cardiovascular: Negative for chest pain, palpitations and leg swelling.   Gastrointestinal: Negative for abdominal distention, abdominal pain, blood in stool, constipation, diarrhea, nausea, rectal pain and vomiting.   Endocrine: Negative for cold intolerance and heat intolerance.   Genitourinary: Negative for difficulty urinating, dysuria, frequency and urgency.   Musculoskeletal: Negative for arthralgias, back pain, gait problem, joint swelling and myalgias.   Skin: Negative for rash.   Neurological: Negative for dizziness, tremors, syncope, weakness, light-headedness, numbness and headaches.   Hematological: Negative for adenopathy. Does not bruise/bleed easily.   Psychiatric/Behavioral: Negative for confusion, sleep disturbance and suicidal ideas. The patient is nervous/anxious.          Current Outpatient Medications:   •  albuterol (PROVENTIL HFA;VENTOLIN HFA) 108 (90 BASE) MCG/ACT inhaler, Inhale 2 puffs every 4 (four) hours as needed for wheezing or shortness of air., Disp: , Rfl:   •  ciprofloxacin (CIPRO) 500 MG tablet, Take 500 mg by mouth 2 (Two) Times a Day., Disp: , Rfl:   •  dexamethasone (DECADRON) 4  MG tablet, Take 1 tablet twice daily starting the day before chemo, day of chemo, and day after chemo.  Take with food., Disp: 30 tablet, Rfl: 3  •  dexamethasone (DECADRON) 4 MG tablet, Take 1 tablet twice daily starting the day before chemo, day of chemo, and day after chemo.  Take with food., Disp: 30 tablet, Rfl: 5  •  folic acid (FOLVITE) 1 MG tablet, Take 1 tablet by mouth Daily. Start at least 7 days prior to chemotherapy until at least 3 weeks after all chemotherapy., Disp: 30 tablet, Rfl: 5  •  guaiFENesin (MUCINEX) 600 MG 12 hr tablet, Take 1 tablet by mouth 2 (Two) Times a Day., Disp: 60 tablet, Rfl: 5  •  HYDROcodone-acetaminophen (NORCO) 7.5-325 MG per tablet, Take 1 tablet by mouth every 6 (six) hours as needed for moderate pain (4-6)., Disp: , Rfl:   •  INCRUSE ELLIPTA 62.5 MCG/INH aerosol powder , Inhale 1 puff Daily., Disp: , Rfl:   •  lactulose (CHRONULAC) 10 GM/15ML solution, Take 30 mL by mouth 3 (Three) Times a Day. PRN constipation, Disp: 240 mL, Rfl: 2  •  levocetirizine (XYZAL) 5 MG tablet, Take 5 mg by mouth Every Evening., Disp: , Rfl:   •  levothyroxine (SYNTHROID, LEVOTHROID) 75 MCG tablet, Take 75 mcg by mouth Daily., Disp: , Rfl:   •  Lidocaine Viscous HCl (XYLOCAINE) 2 % solution, , Disp: , Rfl:   •  magic mouthwash oral suspension, Swish and spit or swallow 5-10 mL 4 (Four) Times a Day As Needed., Disp: 240 mL, Rfl: 3  •  omeprazole (priLOSEC) 40 MG capsule, Take 40 mg by mouth Daily., Disp: , Rfl:   •  ondansetron (ZOFRAN) 8 MG tablet, Take 1 tablet by mouth 3 (Three) Times a Day As Needed for Nausea or Vomiting., Disp: 30 tablet, Rfl: 5  •  promethazine (PHENERGAN) 25 MG tablet, , Disp: , Rfl:   •  SYMBICORT 160-4.5 MCG/ACT inhaler, INL 2 PUFFS PO BID, Disp: , Rfl: 3  •  tamsulosin (FLOMAX) 0.4 MG capsule 24 hr capsule, Take 1 capsule by mouth Daily. Pt will need to contact PCP for further refills., Disp: 30 capsule, Rfl: 0    PHYSICAL EXAMINATION:   /86   Pulse 90   Temp  "97.3 °F (36.3 °C) (Infrared)   Resp 16   Ht 163.8 cm (64.49\")   Wt 83.8 kg (184 lb 11.2 oz)   SpO2 92%   BMI 31.23 kg/m²    Pain Score    01/22/21 0953   PainSc: 0-No pain      ECOG Performance Status: 1 - Symptomatic but completely ambulatory  General Appearance:  alert, cooperative, no apparent distress and appears stated age   Neurologic/Psychiatric: A&O x 3, gait steady, appropriate affect, strength 5/5 in all muscle groups   HEENT:  Normocephalic, without obvious abnormality, mucous membranes moist   Neck: Supple, symmetrical, trachea midline, no adenopathy;  No thyromegaly, masses, or tenderness   Lungs:   Scattered expiratory wheezes throughout; respirations regular, even, and unlabored bilaterally   Heart:  Regular rate and rhythm, no murmurs appreciated   Abdomen:   Soft, non-tender, non-distended and no organomegaly   Lymph nodes: No cervical, supraclavicular, inguinal or axillary adenopathy noted   Extremities: Normal, atraumatic; no clubbing, cyanosis, or edema    Skin: No rashes, ulcers, or suspicious lesions noted     No visits with results within 2 Week(s) from this visit.   Latest known visit with results is:   Hospital Outpatient Visit on 12/28/2020   Component Date Value Ref Range Status   • Glucose 12/28/2020 128* 65 - 99 mg/dL Final   • BUN 12/28/2020 11  8 - 23 mg/dL Final   • Creatinine 12/28/2020 1.29* 0.76 - 1.27 mg/dL Final   • Sodium 12/28/2020 141  136 - 145 mmol/L Final   • Potassium 12/28/2020 3.9  3.5 - 5.2 mmol/L Final    Slight hemolysis detected by analyzer. Results may be affected.   • Chloride 12/28/2020 104  98 - 107 mmol/L Final   • CO2 12/28/2020 25.0  22.0 - 29.0 mmol/L Final   • Calcium 12/28/2020 9.2  8.6 - 10.5 mg/dL Final   • Total Protein 12/28/2020 7.0  6.0 - 8.5 g/dL Final   • Albumin 12/28/2020 3.80  3.50 - 5.20 g/dL Final   • ALT (SGPT) 12/28/2020 11  1 - 41 U/L Final   • AST (SGOT) 12/28/2020 21  1 - 40 U/L Final   • Alkaline Phosphatase 12/28/2020 110  39 - 117 " U/L Final   • Total Bilirubin 12/28/2020 0.2  0.0 - 1.2 mg/dL Final   • eGFR Non African Amer 12/28/2020 56* >60 mL/min/1.73 Final   • Globulin 12/28/2020 3.2  gm/dL Final   • A/G Ratio 12/28/2020 1.2  g/dL Final   • BUN/Creatinine Ratio 12/28/2020 8.5  7.0 - 25.0 Final   • Anion Gap 12/28/2020 12.0  5.0 - 15.0 mmol/L Final   • TSH 12/28/2020 1.300  0.270 - 4.200 uIU/mL Final   • Free T4 12/28/2020 1.15  0.93 - 1.70 ng/dL Final   • WBC 12/28/2020 9.20  3.40 - 10.80 10*3/mm3 Final   • RBC 12/28/2020 3.56* 4.14 - 5.80 10*6/mm3 Final   • Hemoglobin 12/28/2020 11.4* 13.0 - 17.7 g/dL Final   • Hematocrit 12/28/2020 34.5* 37.5 - 51.0 % Final   • RDW 12/28/2020 17.1* 12.3 - 15.4 % Final   • MCV 12/28/2020 96.7  79.0 - 97.0 fL Final   • MCH 12/28/2020 32.0  26.6 - 33.0 pg Final   • MCHC 12/28/2020 33.1  31.5 - 35.7 g/dL Final   • MPV 12/28/2020 6.9  6.0 - 12.0 fL Final   • Platelets 12/28/2020 409  140 - 450 10*3/mm3 Final   • Neutrophil % 12/28/2020 88.1* 42.7 - 76.0 % Final   • Lymphocyte % 12/28/2020 6.8* 19.6 - 45.3 % Final   • Monocyte % 12/28/2020 5.1  5.0 - 12.0 % Final   • Neutrophils, Absolute 12/28/2020 8.10* 1.70 - 7.00 10*3/mm3 Final   • Lymphocytes, Absolute 12/28/2020 0.60* 0.70 - 3.10 10*3/mm3 Final   • Monocytes, Absolute 12/28/2020 0.50  0.10 - 0.90 10*3/mm3 Final   • Creatinine 12/28/2020 1.4  mg/dL Final   • Creatinine 12/28/2020 1.40* 0.60 - 1.30 mg/dL Final    Serial Number: [id number]:  [id number]        No results found.(  ASSESSMENT: The patient is a very pleasant 65 y.o. with metastatic adenocarcinoma of the lung    PROBLEM LIST:  1. Non-small cell lung cancer originally diagnosed in 2009 status post right upper lobectomy done by Dr. Real in 2009.  2. Left upper lobe lung nodules found on surveillance CT in 2012, status post left upper lobe wedge resection with benign pathology.  3.  Left lung cancer, adenocarcinoma:  A.  Presented as a new left lower lobe lung nodule found on chest x-ray done  6/2016.   B. CT scan of the chest done 6/16/2016 showing interval enlargement of left lung nodules. PET CT showing hypermetabolic activity in the left lower lobe nodule.   C. CT guided biopsy of left lower lobe nodule positive for adenocarcinoma of the lung. [id number], stage Ia.  C. Status post CyberKnife therapy completed 9/29/2016 per Dr. Lesia CHÁVEZ.  Repeated PET scan done on October 2019 revealed increase in size as well as hypermetabolic activity left upper lobe lung nodule with a new hypermetabolic active right supraclavicular lymph node and essentially other stable findings.  E.  Status post bronchoscopy with biopsy done by Dr. You November 6, 2019 revealed adenocarcinoma from level 4 left and suspicious cytology from left upper lobe.  F.  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  No enough tissue to do PDL 1 testing.  G.  Started carbo Alimta and Keytruda December 04, 2019, status post 4 cycle  H. Started maintenance Alimta/Keytruda on 2/28/2020 status post 15 cycles.   4.  Hemoptysis:  A.  Status post venous embolization done by Dr. Camp November 7, 2019  B.  Completed by radiation to left upper lobe mass November 26, 2019  4.  Black lung  5. Sarcoidosis  6. Hypothyroidism  7.  Conjunctivitis  8.  Treatment induced nausea  9. Seasonal allergies    PLAN:  1. We will proceed today using Alimta/Keytruda cycle #16 as scheduled today.   2. We will see the patient back in 3 weeks for maintenance regimen using Alimta/Keytruda cycle #17.    3. We will continue to monitor the patient's labs throughout treatment including blood counts, kidney function, thyroid function, and liver functions.   4.  We reviewed again the potential side effects of immunotherapy including but not limited to immune mediated reactions with thyroiditis, pneumonitis, hepatitis, colitis, rash, and electrolytes abnormalities, fatigue, multiorgan failure, and possibly death.  5. We reviewed again the  potential side effects of this regimen including fatigue, vomiting and nausea, hair loss, nephropathy, neuropathy, hearing loss, myelosuppression, and risk of infusion reaction.  6.  We will do 3 months follow-up PET scan which will be due February 2021, and ordered for prior to return.   7.  The patient will continue Mucinex 600 mg take by mouth twice per day as needed for congestion and cough. He is also using Symbicort and Incruse for COPD/Black lung disease.   8. He will continue levothyroxine 75 mcg for hypothyroidism. We will adjust his dose if needed for fluctuations in TSH.   9.  We will continue Zofran as needed for chemotherapy-induced nausea.  10.  We will continue folic acid, vitamin B12, and Decadron per Alimta protocol.  11. The patient will continue Miralax, Senakot, and Lactulose for constipation.   12.  He will continue saline eye drops for conjunctivitis. We will add Decadron eye drops to see if this helps with tearing related to Alimta.   13.  We will continue Magic Mouthwash to use as needed for treatment induced sore throat.    14. We will refer the patient back to Dr. Arnold You for complaints of shortness of air. We will try to schedule this for the same day as his follow up visit secondary to long distance for travel.   15. We will start the patient on Flonase nasal spray for chronic sinusitis.     Snehal Stallworth, APRN  1/22/2021

## 2021-01-31 DIAGNOSIS — C34.12 MALIGNANT NEOPLASM OF UPPER LOBE OF LEFT LUNG (HCC): ICD-10-CM

## 2021-02-01 ENCOUNTER — OFFICE VISIT (OUTPATIENT)
Dept: PULMONOLOGY | Facility: CLINIC | Age: 66
End: 2021-02-01

## 2021-02-01 VITALS
HEIGHT: 64 IN | SYSTOLIC BLOOD PRESSURE: 148 MMHG | OXYGEN SATURATION: 91 % | DIASTOLIC BLOOD PRESSURE: 90 MMHG | TEMPERATURE: 97.5 F | HEART RATE: 102 BPM | BODY MASS INDEX: 30.22 KG/M2 | WEIGHT: 177 LBS

## 2021-02-01 DIAGNOSIS — J60 CWP (COALWORKERS PNEUMOCONIOSIS) (HCC): ICD-10-CM

## 2021-02-01 DIAGNOSIS — J44.9 CHRONIC OBSTRUCTIVE PULMONARY DISEASE, UNSPECIFIED COPD TYPE (HCC): Primary | ICD-10-CM

## 2021-02-01 DIAGNOSIS — C34.12 MALIGNANT NEOPLASM OF UPPER LOBE OF LEFT LUNG (HCC): ICD-10-CM

## 2021-02-01 DIAGNOSIS — C34.32 MALIGNANT NEOPLASM OF LOWER LOBE OF LEFT LUNG (HCC): ICD-10-CM

## 2021-02-01 DIAGNOSIS — C34.11 MALIGNANT NEOPLASM OF UPPER LOBE OF RIGHT LUNG (HCC): ICD-10-CM

## 2021-02-01 PROCEDURE — 99214 OFFICE O/P EST MOD 30 MIN: CPT | Performed by: NURSE PRACTITIONER

## 2021-02-01 RX ORDER — FOLIC ACID 1 MG/1
1 TABLET ORAL DAILY
Qty: 30 TABLET | Refills: 5 | Status: SHIPPED | OUTPATIENT
Start: 2021-02-01 | End: 2021-03-16

## 2021-02-01 NOTE — PROGRESS NOTES
"[facility] Pulmonary Follow up      Chief Complaint  Shortness of Breath    Mahamed Alexis presents to [facility] for shortness of air.     He was last in the office in 2019 by Dr. You.  He has a history of COPD, coal workers pneumoconiosis, possible sarcoidosis, as well as extensive history of lung cancer.  He was diagnosed with RUL NSCLC and had a RUL Lobectomy in 2009, presumably early stage - did not require chemo / RT.  He then had a new nodule in 2012 and had a RUBIO wedge resection that showed CWP.  Both of those where at [facility].    In 2016 patient had a new LLL nodule with CT guided biopsy + for NSCLC and he underwent SBRT here at [facility].   He then underwent EBUS by Dr. You in November 2019 that revealed adenocarcinoma of the lymph node.  He did have a palliative course of radiation to the left upper lobe secondary to persistent hemoptysis.  He is currently on Alimta and Keytruda.    He is here today for evaluation of some worsening shortness of air.  He does have a underlying history of COPD.  His last PFTs were in 2016 with an FEV1 in the 60s.  He does have evidence of emphysema on his CT scan.  We looked at his most recent PET/CT today in the office.  He certainly has underlying emphysema, fibrotic disease, and postradiation changes.    At this time he is using Symbicort twice daily.  He has been on Incruse in the past but is not currently using it.  He still gets the refill of his inhaler through coworkers but is not using it.  He does have a Ventolin he uses sometimes up to 3-4 times a day.    He complains of worsening shortness of breath with any minimal activity.  Especially if he is carrying something heavy or trying to walk up steps.  He denies a significant cough or sputum production.        Objective     Vital Signs:   /90   Pulse 102   Temp 97.5 °F (36.4 °C)   Ht 163.8 cm (64.49\")   Wt 80.3 kg (177 lb)   SpO2 " 91% Comment: resting at room air  BMI 29.92 kg/m²       Physical Exam  Vitals signs reviewed.   Constitutional:       General: He is not in acute distress.     Appearance: Normal appearance. He is well-developed. He is not ill-appearing.   HENT:      Head: Normocephalic and atraumatic.   Eyes:      Pupils: Pupils are equal, round, and reactive to light.   Neck:      Musculoskeletal: Normal range of motion and neck supple.   Cardiovascular:      Rate and Rhythm: Normal rate and regular rhythm.      Heart sounds: No murmur.   Pulmonary:      Effort: Pulmonary effort is normal. No respiratory distress.      Breath sounds: Rales present. No wheezing.      Comments: Decreased with some faint rales  Abdominal:      General: Bowel sounds are normal. There is no distension.      Palpations: Abdomen is soft.   Musculoskeletal: Normal range of motion.   Skin:     General: Skin is warm and dry.      Findings: No erythema.   Neurological:      Mental Status: He is alert and oriented to person, place, and time.   Psychiatric:         Behavior: Behavior normal.          Result Review :                       Assessment and Plan    Problem List Items Addressed This Visit        Other    Malignant neoplasm of lower lobe of left lung, AdenoCA by CT FNA 2016    Malignant neoplasm of upper lobe of right lung, S/P RUL Lobectomy 2009    COPD (chronic obstructive pulmonary disease), Moderate - Primary    CWP (coalworkers pneumoconiosis) (CMS/HCC)    Relevant Orders    Blood Gas, Arterial -With Co-Ox Panel: Yes    Malignant neoplasm of upper lobe of left lung (CMS/HCC)          We had a very long discussion today regarding his shortness of breath.  He certainly has underlying chronic lung disease such as his COPD and coworkers pneumoconiosis that would cause shortness of air.  On top of that he has had several bouts of cancer with lobectomies, wedge resections, radiation, and CyberKnife.  As noted above we did review his most recent PET  CT that shows significant lung changes, that is causing his shortness of breath.    I did encourage him to use his Incruse with his Symbicort.  He needs to be on Incruse daily for his COPD.  He has quite a bit of supply at home but not using it.    He would also likely qualify for home oxygen.  He is 91% on room air at rest, was very dyspneic when he walked back to the room.  It is likely he is having exertional hypoxemia.  We did discuss for follow-up testing included 6-minute walk test and overnight study.  However with his coworkers pneumoconiosis he does need an ABG.  He seemed very hesitant to proceed with oxygen at this time.  I highly encouraged him to follow-up with the ABG so we can get him some oxygen in his home to help with his shortness of breath.      He would also like to follow up with cardiology per patient request.  His brother and most recently underwent open heart surgery and he is wondering if he has any heart blockages.  He will follow up with Dr. Denny and Ms. Stallworth.    I would like for him to follow-up with Dr. You with full PFTs on his next visit.      I spent 35 minutes caring for David on this date of service. This time includes time spent by me in the following activities:preparing for the visit, reviewing tests, obtaining and/or reviewing a separately obtained history, performing a medically appropriate examination and/or evaluation , counseling and educating the patient/family/caregiver, ordering medications, tests, or procedures, referring and communicating with other health care professionals  and documenting information in the medical record  Follow Up     No follow-ups on file.  Patient was given instructions and counseling regarding his condition or for health maintenance advice. Please see specific information pulled into the AVS if appropriate.     MUKESH Moreno, ACNP  [facility]  Electronically signed

## 2021-02-08 ENCOUNTER — HOSPITAL ENCOUNTER (OUTPATIENT)
Dept: PET IMAGING | Facility: HOSPITAL | Age: 66
Discharge: HOME | End: 2021-02-08

## 2021-02-08 DIAGNOSIS — C34.32 MALIGNANT NEOPLASM OF LOWER LOBE OF LEFT LUNG (HCC): ICD-10-CM

## 2021-02-08 LAB — GLUCOSE BLDC GLUCOMTR-MCNC: 98 MG/DL (ref 70–130)

## 2021-02-08 PROCEDURE — 82962 GLUCOSE BLOOD TEST: CPT

## 2021-02-08 PROCEDURE — A9552 F18 FDG: HCPCS | Performed by: NURSE PRACTITIONER

## 2021-02-08 PROCEDURE — 0 FLUDEOXYGLUCOSE F18 SOLUTION: Performed by: NURSE PRACTITIONER

## 2021-02-08 PROCEDURE — 78815 PET IMAGE W/CT SKULL-THIGH: CPT

## 2021-02-08 RX ADMIN — FLUDEOXYGLUCOSE F18 1 DOSE: 300 INJECTION INTRAVENOUS at 11:27

## 2021-02-10 ENCOUNTER — TELEPHONE (OUTPATIENT)
Dept: ONCOLOGY | Facility: CLINIC | Age: 66
End: 2021-02-10

## 2021-02-10 NOTE — TELEPHONE ENCOUNTER
Caller: NIKIA SAGASTUME    Relationship to patient: WIFE    Best call back number: [phone]    Chief complaint: NEEDS TO RESCHEDULE DUE TO WEATHER    Type of visit: LAB, FOLLOW UP, AND INJECTION    Requested date: NEXT WEEK     If rescheduling, when is the original appointment: 02/12

## 2021-02-19 ENCOUNTER — HOSPITAL ENCOUNTER (OUTPATIENT)
Dept: CARDIOLOGY | Facility: HOSPITAL | Age: 66
Discharge: HOME | End: 2021-02-19
Admitting: INTERNAL MEDICINE

## 2021-02-19 ENCOUNTER — OFFICE VISIT (OUTPATIENT)
Dept: ONCOLOGY | Facility: CLINIC | Age: 66
End: 2021-02-19

## 2021-02-19 ENCOUNTER — HOSPITAL ENCOUNTER (OUTPATIENT)
Dept: ONCOLOGY | Facility: HOSPITAL | Age: 66
Setting detail: INFUSION SERIES
Discharge: HOME | End: 2021-02-19

## 2021-02-19 VITALS
RESPIRATION RATE: 20 BRPM | OXYGEN SATURATION: 90 % | WEIGHT: 176 LBS | HEART RATE: 120 BPM | SYSTOLIC BLOOD PRESSURE: 197 MMHG | BODY MASS INDEX: 30.05 KG/M2 | HEIGHT: 64 IN | TEMPERATURE: 96.8 F | DIASTOLIC BLOOD PRESSURE: 92 MMHG

## 2021-02-19 VITALS — SYSTOLIC BLOOD PRESSURE: 147 MMHG | DIASTOLIC BLOOD PRESSURE: 74 MMHG | HEART RATE: 94 BPM

## 2021-02-19 VITALS — WEIGHT: 176 LBS | HEIGHT: 64 IN | BODY MASS INDEX: 30.05 KG/M2

## 2021-02-19 DIAGNOSIS — C34.32 MALIGNANT NEOPLASM OF LOWER LOBE OF LEFT LUNG (HCC): Primary | ICD-10-CM

## 2021-02-19 DIAGNOSIS — I82.621 ACUTE EMBOLISM AND THROMBOSIS OF DEEP VEINS OF RIGHT UPPER EXTREMITY (HCC): ICD-10-CM

## 2021-02-19 DIAGNOSIS — J18.9 PNEUMONITIS: ICD-10-CM

## 2021-02-19 DIAGNOSIS — C34.12 MALIGNANT NEOPLASM OF UPPER LOBE OF LEFT LUNG (HCC): Primary | ICD-10-CM

## 2021-02-19 DIAGNOSIS — M79.89 SWELLING OF RIGHT LOWER EXTREMITY: ICD-10-CM

## 2021-02-19 DIAGNOSIS — C34.12 MALIGNANT NEOPLASM OF UPPER LOBE OF LEFT LUNG (HCC): ICD-10-CM

## 2021-02-19 DIAGNOSIS — C34.32 MALIGNANT NEOPLASM OF LOWER LOBE OF LEFT LUNG (HCC): ICD-10-CM

## 2021-02-19 DIAGNOSIS — I82.621 ACUTE EMBOLISM AND THROMBOSIS OF DEEP VEINS OF RIGHT UPPER EXTREMITY (HCC): Primary | ICD-10-CM

## 2021-02-19 LAB
ALBUMIN SERPL-MCNC: 3.4 G/DL (ref 3.5–5.2)
ALBUMIN/GLOB SERPL: 1.1 G/DL
ALP SERPL-CCNC: 108 U/L (ref 39–117)
ALT SERPL W P-5'-P-CCNC: 15 U/L (ref 1–41)
ANION GAP SERPL CALCULATED.3IONS-SCNC: 11 MMOL/L (ref 5–15)
AST SERPL-CCNC: 21 U/L (ref 1–40)
BH CV LOW VAS RIGHT GREATER SAPH AK VESSEL: 1
BH CV LOW VAS RIGHT GREATER SAPH BK VESSEL: 1
BH CV LOW VAS RIGHT POPLITEAL SPONT: 1
BH CV LOW VAS RIGHT POSTERIOR TIBIAL VESSEL: 1
BH CV LOWER VASCULAR LEFT COMMON FEMORAL AUGMENT: NORMAL
BH CV LOWER VASCULAR LEFT COMMON FEMORAL COMPRESS: NORMAL
BH CV LOWER VASCULAR LEFT COMMON FEMORAL PHASIC: NORMAL
BH CV LOWER VASCULAR LEFT COMMON FEMORAL SPONT: NORMAL
BH CV LOWER VASCULAR RIGHT COMMON FEMORAL AUGMENT: NORMAL
BH CV LOWER VASCULAR RIGHT COMMON FEMORAL COMPRESS: NORMAL
BH CV LOWER VASCULAR RIGHT COMMON FEMORAL PHASIC: NORMAL
BH CV LOWER VASCULAR RIGHT COMMON FEMORAL SPONT: NORMAL
BH CV LOWER VASCULAR RIGHT DISTAL FEMORAL AUGMENT: NORMAL
BH CV LOWER VASCULAR RIGHT DISTAL FEMORAL COMPRESS: NORMAL
BH CV LOWER VASCULAR RIGHT DISTAL FEMORAL PHASIC: NORMAL
BH CV LOWER VASCULAR RIGHT DISTAL FEMORAL SPONT: NORMAL
BH CV LOWER VASCULAR RIGHT GASTRONEMIUS COMPRESS: NORMAL
BH CV LOWER VASCULAR RIGHT GREATER SAPH AK COMPRESS: NORMAL
BH CV LOWER VASCULAR RIGHT GREATER SAPH BK COMPRESS: NORMAL
BH CV LOWER VASCULAR RIGHT LESSER SAPH COMPRESS: NORMAL
BH CV LOWER VASCULAR RIGHT MID FEMORAL AUGMENT: NORMAL
BH CV LOWER VASCULAR RIGHT MID FEMORAL COMPRESS: NORMAL
BH CV LOWER VASCULAR RIGHT MID FEMORAL PHASIC: NORMAL
BH CV LOWER VASCULAR RIGHT MID FEMORAL SPONT: NORMAL
BH CV LOWER VASCULAR RIGHT PERONEAL COMPRESS: NORMAL
BH CV LOWER VASCULAR RIGHT POPLITEAL AUGMENT: NORMAL
BH CV LOWER VASCULAR RIGHT POPLITEAL COMPRESS: NORMAL
BH CV LOWER VASCULAR RIGHT POPLITEAL PHASIC: NORMAL
BH CV LOWER VASCULAR RIGHT POPLITEAL SPONT: NORMAL
BH CV LOWER VASCULAR RIGHT POSTERIOR TIBIAL COMPRESS: NORMAL
BH CV LOWER VASCULAR RIGHT PROFUNDA FEMORAL AUGMENT: NORMAL
BH CV LOWER VASCULAR RIGHT PROFUNDA FEMORAL COMPRESS: NORMAL
BH CV LOWER VASCULAR RIGHT PROFUNDA FEMORAL PHASIC: NORMAL
BH CV LOWER VASCULAR RIGHT PROFUNDA FEMORAL SPONT: NORMAL
BH CV LOWER VASCULAR RIGHT PROXIMAL FEMORAL AUGMENT: NORMAL
BH CV LOWER VASCULAR RIGHT PROXIMAL FEMORAL COMPRESS: NORMAL
BH CV LOWER VASCULAR RIGHT PROXIMAL FEMORAL PHASIC: NORMAL
BH CV LOWER VASCULAR RIGHT PROXIMAL FEMORAL SPONT: NORMAL
BH CV LOWER VASCULAR RIGHT SAPHENOFEMORAL JUNCTION AUGMENT: NORMAL
BH CV LOWER VASCULAR RIGHT SAPHENOFEMORAL JUNCTION COMPRESS: NORMAL
BH CV LOWER VASCULAR RIGHT SAPHENOFEMORAL JUNCTION PHASIC: NORMAL
BH CV LOWER VASCULAR RIGHT SAPHENOFEMORAL JUNCTION SPONT: NORMAL
BH CV LOWER VASCULAR RIGHT SOLEAL COMPRESS: NORMAL
BILIRUB SERPL-MCNC: 0.4 MG/DL (ref 0–1.2)
BUN SERPL-MCNC: 21 MG/DL (ref 8–23)
BUN/CREAT SERPL: 15.2 (ref 7–25)
CALCIUM SPEC-SCNC: 9 MG/DL (ref 8.6–10.5)
CHLORIDE SERPL-SCNC: 101 MMOL/L (ref 98–107)
CO2 SERPL-SCNC: 27 MMOL/L (ref 22–29)
CREAT BLDA-MCNC: 1.5 MG/DL
CREAT BLDA-MCNC: 1.5 MG/DL (ref 0.6–1.3)
CREAT SERPL-MCNC: 1.38 MG/DL (ref 0.76–1.27)
ERYTHROCYTE [DISTWIDTH] IN BLOOD BY AUTOMATED COUNT: 17.7 % (ref 12.3–15.4)
GFR SERPL CREATININE-BSD FRML MDRD: 52 ML/MIN/1.73
GLOBULIN UR ELPH-MCNC: 3 GM/DL
GLUCOSE SERPL-MCNC: 160 MG/DL (ref 65–99)
HCT VFR BLD AUTO: 31.7 % (ref 37.5–51)
HGB BLD-MCNC: 10.3 G/DL (ref 13–17.7)
LYMPHOCYTES # BLD AUTO: 0.6 10*3/MM3 (ref 0.7–3.1)
LYMPHOCYTES NFR BLD AUTO: 4.3 % (ref 19.6–45.3)
MCH RBC QN AUTO: 30.4 PG (ref 26.6–33)
MCHC RBC AUTO-ENTMCNC: 32.6 G/DL (ref 31.5–35.7)
MCV RBC AUTO: 93.5 FL (ref 79–97)
MONOCYTES # BLD AUTO: 0.7 10*3/MM3 (ref 0.1–0.9)
MONOCYTES NFR BLD AUTO: 5.1 % (ref 5–12)
NEUTROPHILS NFR BLD AUTO: 12.5 10*3/MM3 (ref 1.7–7)
NEUTROPHILS NFR BLD AUTO: 90.6 % (ref 42.7–76)
PLATELET # BLD AUTO: 176 10*3/MM3 (ref 140–450)
PMV BLD AUTO: 7.6 FL (ref 6–12)
POTASSIUM SERPL-SCNC: 3.6 MMOL/L (ref 3.5–5.2)
PROT SERPL-MCNC: 6.4 G/DL (ref 6–8.5)
RBC # BLD AUTO: 3.39 10*6/MM3 (ref 4.14–5.8)
SODIUM SERPL-SCNC: 139 MMOL/L (ref 136–145)
T4 FREE SERPL-MCNC: 1.4 NG/DL (ref 0.93–1.7)
TSH SERPL DL<=0.05 MIU/L-ACNC: 1.8 UIU/ML (ref 0.27–4.2)
WBC # BLD AUTO: 13.8 10*3/MM3 (ref 3.4–10.8)

## 2021-02-19 PROCEDURE — 80053 COMPREHEN METABOLIC PANEL: CPT | Performed by: INTERNAL MEDICINE

## 2021-02-19 PROCEDURE — 96409 CHEMO IV PUSH SNGL DRUG: CPT

## 2021-02-19 PROCEDURE — 99215 OFFICE O/P EST HI 40 MIN: CPT | Performed by: INTERNAL MEDICINE

## 2021-02-19 PROCEDURE — 93971 EXTREMITY STUDY: CPT

## 2021-02-19 PROCEDURE — 25010000002 PEMETREXED PER 10 MG: Performed by: INTERNAL MEDICINE

## 2021-02-19 PROCEDURE — 84439 ASSAY OF FREE THYROXINE: CPT | Performed by: INTERNAL MEDICINE

## 2021-02-19 PROCEDURE — 82565 ASSAY OF CREATININE: CPT

## 2021-02-19 PROCEDURE — 84443 ASSAY THYROID STIM HORMONE: CPT | Performed by: INTERNAL MEDICINE

## 2021-02-19 PROCEDURE — 93971 EXTREMITY STUDY: CPT | Performed by: INTERNAL MEDICINE

## 2021-02-19 PROCEDURE — 96372 THER/PROPH/DIAG INJ SC/IM: CPT

## 2021-02-19 PROCEDURE — 25010000002 CYANOCOBALAMIN PER 1000 MCG: Performed by: INTERNAL MEDICINE

## 2021-02-19 PROCEDURE — 85025 COMPLETE CBC W/AUTO DIFF WBC: CPT | Performed by: INTERNAL MEDICINE

## 2021-02-19 RX ORDER — CYANOCOBALAMIN 1000 UG/ML
1000 INJECTION, SOLUTION INTRAMUSCULAR; SUBCUTANEOUS ONCE
Status: CANCELLED | OUTPATIENT
Start: 2021-02-19

## 2021-02-19 RX ORDER — SODIUM CHLORIDE 9 MG/ML
250 INJECTION, SOLUTION INTRAVENOUS ONCE
Status: CANCELLED | OUTPATIENT
Start: 2021-02-19

## 2021-02-19 RX ORDER — METHYLPREDNISOLONE 4 MG/1
TABLET ORAL
COMMUNITY
Start: 2021-02-09

## 2021-02-19 RX ORDER — SODIUM CHLORIDE 9 MG/ML
250 INJECTION, SOLUTION INTRAVENOUS ONCE
Status: DISCONTINUED | OUTPATIENT
Start: 2021-02-19 | End: 2021-02-20 | Stop reason: HOSPADM

## 2021-02-19 RX ORDER — PREDNISONE 10 MG/1
TABLET ORAL
Qty: 50 TABLET | Refills: 0 | Status: SHIPPED | OUTPATIENT
Start: 2021-02-19

## 2021-02-19 RX ORDER — CYANOCOBALAMIN 1000 UG/ML
1000 INJECTION, SOLUTION INTRAMUSCULAR; SUBCUTANEOUS ONCE
Status: COMPLETED | OUTPATIENT
Start: 2021-02-19 | End: 2021-02-19

## 2021-02-19 RX ADMIN — CYANOCOBALAMIN 1000 MCG: 1000 INJECTION, SOLUTION INTRAMUSCULAR; SUBCUTANEOUS at 11:46

## 2021-02-19 RX ADMIN — SODIUM CHLORIDE 900 MG: 9 INJECTION, SOLUTION INTRAVENOUS at 12:20

## 2021-02-19 NOTE — TELEPHONE ENCOUNTER
Received call from Kenna in [facility] that patient is positive for a blood clot in RLE.  States he has one in his popliteal behind his knee and they are concerned about it breaking off and traveling.  Also has a superficial on in his greater saphenous vein.  Discussed with Dr. Denny who advised to do eliquis 10mg BID for 7 days and 5mg BID thereafter.  Discussed with patient and sent to his local pharmacy.  Advised him to go to ER for sudden/acute chest pain or shortness of air. Patient verbalized understanding.

## 2021-02-19 NOTE — PROGRESS NOTES
DATE OF VISIT: 2/19/2021    REASON FOR VISIT: Recurrent adenocarcinoma of the lung       HISTORY OF PRESENT ILLNESS: The patient is a very pleasant 65 y.o. male  with past medical history significant for lung cancer diagnosed 6/22/2016 . 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 PET scan done 6/22/2016 showed findings consistent with recurrent neoplasm in the left upper lobe, with metastatic adenopathy to the left hilum and AP window as well as a metastatic nodule in the left lower lobe. CT-guided biopsy of the left lower lobe nodule was performed that showed adenocarcinoma of the lung. The patient underwent CyberKnife therapy by Dr Pinzon completed 9/29/2016.  Repeat scan showed gradual increase in size of left upper lobe lung infiltrates.  Patient had whole-body PET scan that revealed increased metabolic activity in the left lung infiltrate as well as a new right supraclavicular lymph nodes.  Patient had bronchoscopy with biopsy done by Dr. You on November 6, 2019 that revealed adenocarcinoma and level 4 left mediastinal lymph node.  He received palliative course of radiation to the left upper lobe secondary to persistent hemoptysis followed by palliative treatment with carboplatin Alimta and Keytruda started December 4, 2019.  Completed 4 cycles on February 7, 2020.  The patient was started on Alimta with Keytruda on February 28, 2020.  The patient is here today for scheduled follow up visit with treatment cycle #17.     SUBJECTIVE:The patient is here today by himself.  He is complaining of shortness of breath.  He finished a short course of Medrol pack and that helped his breathing slightly.  He is anxious about the scan results.      PAST MEDICAL HISTORY/SOCIAL HISTORY/FAMILY HISTORY: Unchanged from my prior documentation done on 08/02/2016.     Review of Systems   Constitutional: Positive for fatigue. Negative for  activity change, appetite change, chills, fever and unexpected weight change.   HENT: Positive for sinus pressure. Negative for hearing loss, mouth sores, nosebleeds, rhinorrhea, sneezing, sore throat and trouble swallowing.    Eyes: Negative for itching and visual disturbance.        Tearing   Respiratory: Positive for shortness of breath. Negative for cough, chest tightness and wheezing.         With activity   Cardiovascular: Negative for chest pain, palpitations and leg swelling.   Gastrointestinal: Negative for abdominal distention, abdominal pain, blood in stool, constipation, diarrhea, nausea, rectal pain and vomiting.   Endocrine: Negative for cold intolerance and heat intolerance.   Genitourinary: Negative for difficulty urinating, dysuria, frequency and urgency.   Musculoskeletal: Negative for arthralgias, back pain, gait problem, joint swelling and myalgias.   Skin: Negative for rash.   Neurological: Negative for dizziness, tremors, syncope, weakness, light-headedness, numbness and headaches.   Hematological: Negative for adenopathy. Does not bruise/bleed easily.   Psychiatric/Behavioral: Negative for confusion, sleep disturbance and suicidal ideas. The patient is nervous/anxious.          Current Outpatient Medications:   •  albuterol (PROVENTIL HFA;VENTOLIN HFA) 108 (90 BASE) MCG/ACT inhaler, Inhale 2 puffs every 4 (four) hours as needed for wheezing or shortness of air., Disp: , Rfl:   •  dexamethasone (DECADRON) 0.1 % ophthalmic solution, Administer 1 drop to both eyes Every 6 (Six) Hours., Disp: 5 mL, Rfl: 2  •  dexamethasone (DECADRON) 4 MG tablet, Take 1 tablet twice daily starting the day before chemo, day of chemo, and day after chemo.  Take with food., Disp: 30 tablet, Rfl: 3  •  dexamethasone (DECADRON) 4 MG tablet, Take 1 tablet twice daily starting the day before chemo, day of chemo, and day after chemo.  Take with food., Disp: 30 tablet, Rfl: 5  •  fluticasone (Flonase) 50 MCG/ACT nasal  spray, 2 sprays into the nostril(s) as directed by provider Daily. Administer 2 sprays in each nostril for each dose., Disp: 1 bottle, Rfl: 5  •  folic acid (FOLVITE) 1 MG tablet, TAKE 1 TABLET BY MOUTH DAILY. START AT LEAST 7 DAYS PRIOR TO CHEMOTHERAPY UNTIL AT LEAST 3 WEEKS AFTER ALL CHEMOTHERAPY., Disp: 30 tablet, Rfl: 5  •  guaiFENesin (MUCINEX) 600 MG 12 hr tablet, Take 1 tablet by mouth 2 (Two) Times a Day., Disp: 60 tablet, Rfl: 5  •  HYDROcodone-acetaminophen (NORCO) 7.5-325 MG per tablet, Take 1 tablet by mouth every 6 (six) hours as needed for moderate pain (4-6)., Disp: , Rfl:   •  INCRUSE ELLIPTA 62.5 MCG/INH aerosol powder , Inhale 1 puff Daily., Disp: , Rfl:   •  lactulose (CHRONULAC) 10 GM/15ML solution, Take 30 mL by mouth 3 (Three) Times a Day. PRN constipation, Disp: 240 mL, Rfl: 2  •  levocetirizine (XYZAL) 5 MG tablet, Take 5 mg by mouth Every Evening., Disp: , Rfl:   •  levothyroxine (SYNTHROID, LEVOTHROID) 75 MCG tablet, Take 75 mcg by mouth Daily., Disp: , Rfl:   •  Lidocaine Viscous HCl (XYLOCAINE) 2 % solution, , Disp: , Rfl:   •  magic mouthwash oral suspension, Swish and spit or swallow 5-10 mL 4 (Four) Times a Day As Needed., Disp: 240 mL, Rfl: 3  •  methylPREDNISolone (MEDROL) 4 MG dose pack, TAKE 6 TABLETS BY MOUTH ON DAY 1 AND DECREASE BY 1 TABLET DAILY UNTL GONE, Disp: , Rfl:   •  omeprazole (priLOSEC) 40 MG capsule, Take 40 mg by mouth Daily., Disp: , Rfl:   •  ondansetron (ZOFRAN) 8 MG tablet, Take 1 tablet by mouth 3 (Three) Times a Day As Needed for Nausea or Vomiting., Disp: 30 tablet, Rfl: 5  •  predniSONE (DELTASONE) 10 MG tablet, Take 40mg daily for 5 days, decrease my 10mg every 5 days until complete., Disp: 50 tablet, Rfl: 0  •  promethazine (PHENERGAN) 25 MG tablet, , Disp: , Rfl:   •  SYMBICORT 160-4.5 MCG/ACT inhaler, INL 2 PUFFS PO BID, Disp: , Rfl: 3  •  tamsulosin (FLOMAX) 0.4 MG capsule 24 hr capsule, Take 1 capsule by mouth Daily. Pt will need to contact PCP for  "further refills., Disp: 30 capsule, Rfl: 0    PHYSICAL EXAMINATION:   BP (!) 197/92   Pulse 120   Temp 96.8 °F (36 °C) (Temporal)   Resp 20   Ht 163.8 cm (64.49\")   Wt 79.8 kg (176 lb)   SpO2 90%   BMI 29.75 kg/m²    Pain Score    02/19/21 1006   PainSc: 0-No pain      ECOG Performance Status: 1 - Symptomatic but completely ambulatory  General Appearance:  alert, cooperative, no apparent distress and appears stated age   Neurologic/Psychiatric: A&O x 3, gait steady, appropriate affect, strength 5/5 in all muscle groups   HEENT:  Normocephalic, without obvious abnormality, mucous membranes moist   Neck: Supple, symmetrical, trachea midline, no adenopathy;  No thyromegaly, masses, or tenderness   Lungs:   Scattered expiratory wheezes throughout; respirations regular, even, and unlabored bilaterally   Heart:  Regular rate and rhythm, no murmurs appreciated   Abdomen:   Soft, non-tender, non-distended and no organomegaly   Lymph nodes: No cervical, supraclavicular, inguinal or axillary adenopathy noted   Extremities: Normal, atraumatic; no clubbing, cyanosis, or edema    Skin: No rashes, ulcers, or suspicious lesions noted     Hospital Outpatient Visit on 02/08/2021   Component Date Value Ref Range Status   • Glucose 02/08/2021 98  70 - 130 mg/dL Final        Nm Pet Skull Base To Mid Thigh    Result Date: 2/8/2021  Narrative: EXAMINATION: NM PET SKULL BASE TO MID THIGH- 02/08/2021  INDICATION: Restaging lung cancer; C34.32-Malignant neoplasm of lower lobe, left bronchus or lung; follow-up lung cancer  TECHNIQUE: With fasting blood glucose level of 98 mg/dL a total of 11.76 mCi of FDG was administered via the right wrist. Following appropriate delay PET/CT imaging was obtained from the skull vertex to the mid thighs bilaterally and fused multiplanar images were reconstructed. The CT scan is an unenhanced study and used for reference to the PET scan only and should not be considered a diagnostic CT scan. PET/CT " imaging was reviewed in the axial, coronal, and sagittal planes as well as within the cine mode.  The radiation dose reduction device was turned on for each scan per the ALARA (As Low as Reasonably Achievable) protocol.  COMPARISON: Subsequent exam for treatment with prior study available for comparison dated 11/10/2020.  FINDINGS: Normal variant activity identified within the oropharynx and muscles of phonation. Normal variant activity identified in the region of the vocal cords. No hypermetabolic activity seen within the neck to suggest evidence of metastatic disease. There is a supraclavicular lymph node identified on the right today with maximum SUV of 3.7 and previous maximum issue within this lymph node was 4.3. There is hypermetabolic activity correlating to an abnormal mass in the left lung apex today with maximum SUV of 7.1 and previous maximum SUV was 8.5. There is improvement as well seen in the activity within the posterior nodule in the right lung maximum SUV today 2.9 and previous maximum SUV was 3.1. There is however a new pulmonary nodule identified in the periphery of the left lung on today's examination that is new when compared to the prior study today with maximum SUV of 4.8. The hilar adenopathy is improved. There is development of an effusion identified on the left with also significant increase seen in the hypermetabolic activity in the soft tissue mass posteriorly in the left lower lobe today with maximum SUV of 8.7. This mass previously demonstrated hypermetabolic activity which was significantly lower at 3.8. The size of the mass is similar when compared to the prior examination. The remainder of the abdomen and pelvis is unremarkable. There is a small focus of hypermetabolic activity correlating within the bowel anteriorly within the abdomen which does not demonstrate an abnormal CT correlate. The upper thighs are unremarkable.      Impression: There is a mixed response with all the areas  of activity seen on the prior examination to have improved in the interval; the mass posteriorly within the left lower lobe which has significantly worsened in its activity in the interval. There is development of a small left pleural effusion with also a new nodular density seen in the periphery of the left upper lobe. There are 2 hypermetabolic areas that are worse when compared to the prior examination. The remainder of the previously seen hypermetabolic areas have all improved.   D:  02/08/2021 E:  02/08/2021  This report was finalized on 2/8/2021 4:06 PM by Dr. Henrietta Menjivar MD.    (  ASSESSMENT: The patient is a very pleasant 65 y.o. with metastatic adenocarcinoma of the lung    PROBLEM LIST:  1. Non-small cell lung cancer originally diagnosed in 2009 status post right upper lobectomy done by Dr. Real in 2009.  2. Left upper lobe lung nodules found on surveillance CT in 2012, status post left upper lobe wedge resection with benign pathology.  3.  Left lung cancer, adenocarcinoma:  A.  Presented as a new left lower lobe lung nodule found on chest x-ray done 6/2016.   B. CT scan of the chest done 6/16/2016 showing interval enlargement of left lung nodules. PET CT showing hypermetabolic activity in the left lower lobe nodule.   C. CT guided biopsy of left lower lobe nodule positive for adenocarcinoma of the lung. [id number], stage Ia.  C. Status post CyberKnife therapy completed 9/29/2016 per Dr. Lesia CHÁVEZ.  Repeated PET scan done on October 2019 revealed increase in size as well as hypermetabolic activity left upper lobe lung nodule with a new hypermetabolic active right supraclavicular lymph node and essentially other stable findings.  E.  Status post bronchoscopy with biopsy done by Dr. You November 6, 2019 revealed adenocarcinoma from level 4 left and suspicious cytology from left upper lobe.  F.  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  No enough tissue to  do PDL 1 testing.  G.  Started carbo Alimta and Keytruda December 04, 2019, status post 4 cycle  H. Started maintenance Alimta/Keytruda on 2/28/2020 status post 16 cycles.   4.  Hemoptysis:  A.  Status post venous embolization done by Dr. Camp November 7, 2019  B.  Completed by radiation to left upper lobe mass November 26, 2019  4.  Black lung  5. Sarcoidosis  6. Hypothyroidism  7.  Conjunctivitis  8.  Treatment induced nausea  9. Seasonal allergies  10.  Grade 2 pneumonitis    PLAN:  1. We will proceed today using Alimta cycle #17 as scheduled today.  I will hold Keytruda for today and next cycle given grade 2 pneumonitis.  2. We will see the patient back in 3 weeks for maintenance regimen using Alimta cycle #18.  I will consider adding Keytruda cycle #19.  3. We will continue to monitor the patient's labs throughout treatment including blood counts, kidney function, thyroid function, and liver functions.   4.  We reviewed again the potential side effects of immunotherapy including but not limited to immune mediated reactions with thyroiditis, pneumonitis, hepatitis, colitis, rash, and electrolytes abnormalities, fatigue, multiorgan failure, and possibly death.  5. We reviewed again the potential side effects of this regimen including fatigue, vomiting and nausea, hair loss, nephropathy, neuropathy, hearing loss, myelosuppression, and risk of infusion reaction.  6.  I did go over the PET scan results with the patient I reviewed the films myself I have compared the current PET scan to previous study done November 10, 2020.  I explained to the patient on all his disease looking better however the left lower lobe course.  This could be induced by pneumonitis.  RP the patient PET scan in 3 months which would be due May 2021.   7.  The patient will continue Mucinex 600 mg take by mouth twice per day as needed for congestion and cough. He is also using Symbicort and Incruse for COPD/Black lung disease.   8. He will  continue levothyroxine 75 mcg for hypothyroidism. We will adjust his dose if needed for fluctuations in TSH.   9.  We will continue Zofran as needed for chemotherapy-induced nausea.  10.  We will continue folic acid, vitamin B12, and Decadron per Alimta protocol.  11. The patient will continue Miralax, Senakot, and Lactulose for constipation.   12.  He will continue saline eye drops for conjunctivitis. We will add Decadron eye drops to see if this helps with tearing related to Alimta.   13.  We will continue Magic Mouthwash to use as needed for treatment induced sore throat.    14.  Will follow-up with pulmonary for interstitial lung disease.   15.  He will continue Flonase nasal spray for chronic sinusitis.   16.  I will treat the patient with prednisone 40 mg daily with 10 mg taper every 5 days for grade 2 pneumonitis.  Padmaja Denny MD  2/19/2021

## 2021-02-22 ENCOUNTER — TELEPHONE (OUTPATIENT)
Dept: ONCOLOGY | Facility: CLINIC | Age: 66
End: 2021-02-22

## 2021-02-22 NOTE — TELEPHONE ENCOUNTER
Called and discussed with patients wife that it is fine to hold blood thinner for 2 days.  I asked what was going on and she said that the pulmonologist in [place] saw him today and said he was full of fluid and they needed to drain it and that they also sent him home with oxygen today.  I let patient wife know it is fine to hold his blood thinner for 2 days prior.  Let Dr. Denny know as well what was going on with patient.

## 2021-02-22 NOTE — TELEPHONE ENCOUNTER
Caller: bryson    Relationship to patient: wife    Best call back number: [phone], [phone]    Wife states that the pt will be having a procedure done soon. In order to have the procedure done, the pt will need to be off the blood thinners for 2 days, starting today 2/22. Pt would like to know if it is a good idea to stop taking blood thinners. Please advise, thank you.

## 2021-03-09 ENCOUNTER — TELEPHONE (OUTPATIENT)
Dept: ONCOLOGY | Facility: CLINIC | Age: 66
End: 2021-03-09

## 2021-03-09 NOTE — TELEPHONE ENCOUNTER
Called and unable to leave message for patients wife as voicemail states unavailable.  Will try again tomorrow.

## 2021-03-09 NOTE — TELEPHONE ENCOUNTER
Rhina patient's wife left a voicemail she wants a call back. Patient is inpatient at [facility] with a lot of fluid on his lung.

## 2021-03-10 NOTE — TELEPHONE ENCOUNTER
Called again this morning and unable to leave message for patients wife as mailbox unable to accept more messages.

## 2021-03-11 ENCOUNTER — APPOINTMENT (OUTPATIENT)
Dept: ONCOLOGY | Facility: HOSPITAL | Age: 66
End: 2021-03-11

## 2021-03-11 NOTE — TELEPHONE ENCOUNTER
Attempted to call patient wife again.  Unable to leave a message.  We have asked [facility] to send us records from patients current hospitalization.

## 2021-03-12 NOTE — TELEPHONE ENCOUNTER
Was able to reach both patient and wife this morning.  He was released from hospital last night in [place].  She reports he is on continuous oxygen now and that the fluid they drained showed cancerous cells.  I have requested records from [place] including d/c summary, scans and any path reports in past few weeks. Message routed to scheduling to have patient in office next week with Dr. Denny

## 2021-03-15 NOTE — TELEPHONE ENCOUNTER
Spoke with patients wife. She acted like they will not be doing treatment. They decided they will do a telephone visit to discuss records that were sent from [facility]

## 2021-03-16 ENCOUNTER — OFFICE VISIT (OUTPATIENT)
Dept: ONCOLOGY | Facility: CLINIC | Age: 66
End: 2021-03-16

## 2021-03-16 DIAGNOSIS — C34.12 MALIGNANT NEOPLASM OF UPPER LOBE OF LEFT LUNG (HCC): ICD-10-CM

## 2021-03-16 DIAGNOSIS — C34.32 MALIGNANT NEOPLASM OF LOWER LOBE OF LEFT LUNG (HCC): Primary | ICD-10-CM

## 2021-03-16 PROCEDURE — 99443 PR PHYS/QHP TELEPHONE EVALUATION 21-30 MIN: CPT | Performed by: INTERNAL MEDICINE

## 2021-03-16 RX ORDER — DEXAMETHASONE 4 MG/1
TABLET ORAL
Qty: 12 TABLET | Refills: 5 | Status: CANCELLED | OUTPATIENT
Start: 2021-04-05

## 2021-03-16 RX ORDER — DIPHENHYDRAMINE HYDROCHLORIDE 50 MG/ML
50 INJECTION INTRAMUSCULAR; INTRAVENOUS AS NEEDED
OUTPATIENT
Start: 2021-04-06

## 2021-03-16 RX ORDER — DEXAMETHASONE 4 MG/1
TABLET ORAL
Qty: 60 TABLET | Refills: 0 | Status: SHIPPED | OUTPATIENT
Start: 2021-03-16

## 2021-03-16 RX ORDER — FAMOTIDINE 10 MG/ML
20 INJECTION, SOLUTION INTRAVENOUS AS NEEDED
OUTPATIENT
Start: 2021-04-06

## 2021-03-16 RX ORDER — ONDANSETRON HYDROCHLORIDE 8 MG/1
8 TABLET, FILM COATED ORAL 3 TIMES DAILY PRN
Qty: 30 TABLET | Refills: 5 | Status: SHIPPED | OUTPATIENT
Start: 2021-03-16

## 2021-03-16 RX ORDER — SODIUM CHLORIDE 9 MG/ML
250 INJECTION, SOLUTION INTRAVENOUS ONCE
OUTPATIENT
Start: 2021-04-06

## 2021-03-16 RX ORDER — ONDANSETRON HYDROCHLORIDE 8 MG/1
8 TABLET, FILM COATED ORAL 3 TIMES DAILY PRN
Qty: 30 TABLET | Refills: 5 | Status: CANCELLED | OUTPATIENT
Start: 2021-04-05

## 2021-03-16 NOTE — PROGRESS NOTES
DATE OF VISIT: 3/16/2021    This visit has been rescheduled as a phone visit to comply with patient safety concerns in accordance with CDC recommendations. Total time of discussion was 22 minutes.    You have chosen to receive care through a telephone visit. Do you consent to use a telephone visit for your medical care today? Yes    REASON FOR VISIT: Recurrent adenocarcinoma of the lung       HISTORY OF PRESENT ILLNESS: The patient is a very pleasant 65 y.o. male  with past medical history significant for lung cancer diagnosed 6/22/2016 . 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 PET scan done 6/22/2016 showed findings consistent with recurrent neoplasm in the left upper lobe, with metastatic adenopathy to the left hilum and AP window as well as a metastatic nodule in the left lower lobe. CT-guided biopsy of the left lower lobe nodule was performed that showed adenocarcinoma of the lung. The patient underwent CyberKnife therapy by Dr Pnizon completed 9/29/2016.  Repeat scan showed gradual increase in size of left upper lobe lung infiltrates.  Patient had whole-body PET scan that revealed increased metabolic activity in the left lung infiltrate as well as a new right supraclavicular lymph nodes.  Patient had bronchoscopy with biopsy done by Dr. You on November 6, 2019 that revealed adenocarcinoma and level 4 left mediastinal lymph node.  He received palliative course of radiation to the left upper lobe secondary to persistent hemoptysis followed by palliative treatment with carboplatin Alimta and Keytruda started December 4, 2019.  Completed 4 cycles on February 7, 2020.  The patient was started on Alimta with Keytruda on February 28, 2020.  He completed 17 cycles then stopped  treatment March 2021 secondary to progressive disease with new recurrent malignant left pleural effusion.  The patient is here today for scheduled  follow up visit.     SUBJECTIVE:The patient was interviewed today using telemedicine given the current pandemic.  His wife sitting next to him.  He is feeling better however he still on 5 L oxygen per his been drained again on March 10, 2021.    PAST MEDICAL HISTORY/SOCIAL HISTORY/FAMILY HISTORY: Unchanged from my prior documentation done on 08/02/2016.     Review of Systems   Constitutional: Positive for fatigue. Negative for activity change, appetite change, chills, fever and unexpected weight change.   HENT: Positive for sinus pressure. Negative for hearing loss, mouth sores, nosebleeds, rhinorrhea, sneezing, sore throat and trouble swallowing.    Eyes: Negative for itching and visual disturbance.        Tearing   Respiratory: Positive for shortness of breath. Negative for cough, chest tightness and wheezing.         With activity   Cardiovascular: Negative for chest pain, palpitations and leg swelling.   Gastrointestinal: Negative for abdominal distention, abdominal pain, blood in stool, constipation, diarrhea, nausea, rectal pain and vomiting.   Endocrine: Negative for cold intolerance and heat intolerance.   Genitourinary: Negative for difficulty urinating, dysuria, frequency and urgency.   Musculoskeletal: Negative for arthralgias, back pain, gait problem, joint swelling and myalgias.   Skin: Negative for rash.   Neurological: Negative for dizziness, tremors, syncope, weakness, light-headedness, numbness and headaches.   Hematological: Negative for adenopathy. Does not bruise/bleed easily.   Psychiatric/Behavioral: Negative for confusion, sleep disturbance and suicidal ideas. The patient is nervous/anxious.          Current Outpatient Medications:   •  albuterol (PROVENTIL HFA;VENTOLIN HFA) 108 (90 BASE) MCG/ACT inhaler, Inhale 2 puffs every 4 (four) hours as needed for wheezing or shortness of air., Disp: , Rfl:   •  Apixaban Starter Pack tablet therapy pack, Take two 5 mg tablets by mouth every 12 hours for  7 days. Followed by one 5 mg tablet every 12 hours. (Dispense starter pack if available), Disp: 74 tablet, Rfl: 0  •  dexamethasone (DECADRON) 0.1 % ophthalmic solution, Administer 1 drop to both eyes Every 6 (Six) Hours., Disp: 5 mL, Rfl: 2  •  dexamethasone (DECADRON) 4 MG tablet, Take 1 tablet twice daily starting the day before chemo, day of chemo, and day after chemo.  Take with food., Disp: 30 tablet, Rfl: 3  •  dexamethasone (DECADRON) 4 MG tablet, Take 1 tablet twice daily starting the day before chemo, day of chemo, and day after chemo.  Take with food., Disp: 30 tablet, Rfl: 5  •  fluticasone (Flonase) 50 MCG/ACT nasal spray, 2 sprays into the nostril(s) as directed by provider Daily. Administer 2 sprays in each nostril for each dose., Disp: 1 bottle, Rfl: 5  •  folic acid (FOLVITE) 1 MG tablet, TAKE 1 TABLET BY MOUTH DAILY. START AT LEAST 7 DAYS PRIOR TO CHEMOTHERAPY UNTIL AT LEAST 3 WEEKS AFTER ALL CHEMOTHERAPY., Disp: 30 tablet, Rfl: 5  •  guaiFENesin (MUCINEX) 600 MG 12 hr tablet, Take 1 tablet by mouth 2 (Two) Times a Day., Disp: 60 tablet, Rfl: 5  •  HYDROcodone-acetaminophen (NORCO) 7.5-325 MG per tablet, Take 1 tablet by mouth every 6 (six) hours as needed for moderate pain (4-6)., Disp: , Rfl:   •  INCRUSE ELLIPTA 62.5 MCG/INH aerosol powder , Inhale 1 puff Daily., Disp: , Rfl:   •  lactulose (CHRONULAC) 10 GM/15ML solution, Take 30 mL by mouth 3 (Three) Times a Day. PRN constipation, Disp: 240 mL, Rfl: 2  •  levocetirizine (XYZAL) 5 MG tablet, Take 5 mg by mouth Every Evening., Disp: , Rfl:   •  levothyroxine (SYNTHROID, LEVOTHROID) 75 MCG tablet, Take 75 mcg by mouth Daily., Disp: , Rfl:   •  Lidocaine Viscous HCl (XYLOCAINE) 2 % solution, , Disp: , Rfl:   •  magic mouthwash oral suspension, Swish and spit or swallow 5-10 mL 4 (Four) Times a Day As Needed., Disp: 240 mL, Rfl: 3  •  methylPREDNISolone (MEDROL) 4 MG dose pack, TAKE 6 TABLETS BY MOUTH ON DAY 1 AND DECREASE BY 1 TABLET DAILY UNTL  GONE, Disp: , Rfl:   •  omeprazole (priLOSEC) 40 MG capsule, Take 40 mg by mouth Daily., Disp: , Rfl:   •  ondansetron (ZOFRAN) 8 MG tablet, Take 1 tablet by mouth 3 (Three) Times a Day As Needed for Nausea or Vomiting., Disp: 30 tablet, Rfl: 5  •  predniSONE (DELTASONE) 10 MG tablet, Take 40mg daily for 5 days, decrease my 10mg every 5 days until complete., Disp: 50 tablet, Rfl: 0  •  promethazine (PHENERGAN) 25 MG tablet, , Disp: , Rfl:   •  SYMBICORT 160-4.5 MCG/ACT inhaler, INL 2 PUFFS PO BID, Disp: , Rfl: 3  •  tamsulosin (FLOMAX) 0.4 MG capsule 24 hr capsule, Take 1 capsule by mouth Daily. Pt will need to contact PCP for further refills., Disp: 30 capsule, Rfl: 0    PHYSICAL EXAMINATION:   There were no vitals taken for this visit.   There were no vitals filed for this visit.   ECOG Performance Status: 1 - Symptomatic but completely ambulatory  General Appearance:  alert, cooperative, no apparent distress and appears stated age   Neurologic/Psychiatric: A&O x 3, gait steady, appropriate affect, strength 5/5 in all muscle groups   HEENT:     Neck:    Lungs:      Heart:     Abdomen:      Lymph nodes:    Extremities:    Skin:      No visits with results within 2 Week(s) from this visit.   Latest known visit with results is:   Hospital Outpatient Visit on 02/19/2021   Component Date Value Ref Range Status   • Right Popliteal Spont 02/19/2021 1   Final   • Right Posterior Tibial Vessel 02/19/2021 1   Final   • Right Greater Saph AK Vessel 02/19/2021 1   Final   • Right Greater Saph BK Vessel 02/19/2021 1   Final   • Right Common Femoral Spont 02/19/2021 Y   Final   • Right Common Femoral Phasic 02/19/2021 Y   Final   • Right Common Femoral Augment 02/19/2021 Y   Final   • Right Common Femoral Compress 02/19/2021 C   Final   • Right Saphenofemoral Junction Spont 02/19/2021 Y   Final   • Right Saphenofemoral Junction Phas* 02/19/2021 Y   Final   • Right Saphenofemoral Junction Augm* 02/19/2021 Y   Final   • Right  Saphenofemoral Junction Comp* 02/19/2021 C   Final   • Right Profunda Femoral Spont 02/19/2021 Y   Final   • Right Profunda Femoral Phasic 02/19/2021 Y   Final   • Right Profunda Femoral Augment 02/19/2021 Y   Final   • Right Profunda Femoral Compress 02/19/2021 C   Final   • Right Proximal Femoral Spont 02/19/2021 Y   Final   • Right Proximal Femoral Phasic 02/19/2021 Y   Final   • Right Proximal Femoral Augment 02/19/2021 Y   Final   • Right Proximal Femoral Compress 02/19/2021 C   Final   • Right Mid Femoral Spont 02/19/2021 Y   Final   • Right Mid Femoral Phasic 02/19/2021 Y   Final   • Right Mid Femoral Augment 02/19/2021 Y   Final   • Right Mid Femoral Compress 02/19/2021 C   Final   • Right Distal Femoral Spont 02/19/2021 Y   Final   • Right Distal Femoral Phasic 02/19/2021 Y   Final   • Right Distal Femoral Augment 02/19/2021 Y   Final   • Right Distal Femoral Compress 02/19/2021 C   Final   • Right Popliteal Spont 02/19/2021 N   Final   • Right Popliteal Phasic 02/19/2021 N   Final   • Right Popliteal Augment 02/19/2021 N   Final   • Right Popliteal Compress 02/19/2021 N   Final   • Right Posterior Tibial Compress 02/19/2021 N   Final   • Right Peroneal Compress 02/19/2021 C   Final   • Right GastronemiusSoleal Compress 02/19/2021 C   Final   • Right Greater Saph AK Compress 02/19/2021 N   Final   • Right Greater Saph BK Compress 02/19/2021 N   Final   • Right Lesser Saph Compress 02/19/2021 C   Final   • Left Common Femoral Spont 02/19/2021 Y   Final   • Left Common Femoral Phasic 02/19/2021 Y   Final   • Left Common Femoral Augment 02/19/2021 Y   Final   • Left Common Femoral Compress 02/19/2021 C   Final   • BH CV LOWER VASCULAR RIGHT SOLEAL * 02/19/2021 C   Final        Duplex Venous Lower Extremity - Right CAR    Result Date: 2/19/2021  Narrative: · Acute right lower extremity deep vein thrombosis noted in the popliteal and posterior tibial veins · Acute right lower extremity superficial  thrombophlebitis noted in the greater saphenous above and below the knee    (  ASSESSMENT: The patient is a very pleasant 65 y.o. with metastatic adenocarcinoma of the lung    PROBLEM LIST:  1. Non-small cell lung cancer originally diagnosed in 2009 status post right upper lobectomy done by Dr. Real in 2009.  2. Left upper lobe lung nodules found on surveillance CT in 2012, status post left upper lobe wedge resection with benign pathology.  3.  Left lung cancer, adenocarcinoma:  A.  Presented as a new left lower lobe lung nodule found on chest x-ray done 6/2016.   B. CT scan of the chest done 6/16/2016 showing interval enlargement of left lung nodules. PET CT showing hypermetabolic activity in the left lower lobe nodule.   C. CT guided biopsy of left lower lobe nodule positive for adenocarcinoma of the lung. [id number], stage Ia.  C. Status post CyberKnife therapy completed 9/29/2016 per Dr. Lesia CHÁVEZ.  Repeated PET scan done on October 2019 revealed increase in size as well as hypermetabolic activity left upper lobe lung nodule with a new hypermetabolic active right supraclavicular lymph node and essentially other stable findings.  E.  Status post bronchoscopy with biopsy done by Dr. You November 6, 2019 revealed adenocarcinoma from level 4 left and suspicious cytology from left upper lobe.  F.  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  No enough tissue to do PDL 1 testing.  G.  Started carbo Alimta and Keytruda December 04, 2019, status post 4 cycle  H. Started maintenance Alimta/Keytruda on 2/28/2020 status post 17 cycles.  I.  Progressive disease with recurrent malignant left pleural effusion  J.  We will start that there was Cyramza April 2021  4.  Hemoptysis:  A.  Status post venous embolization done by Dr. Camp November 7, 2019  B.  Completed by radiation to left upper lobe mass November 26, 2019  4.  Black lung  5. Sarcoidosis  6. Hypothyroidism  7.  Left  pleural effusion:  A.  Status post thoracentesis February 23 and March 10, 2021  B.  Cytology positive for malignant cells  8.  Treatment induced nausea  9. Seasonal allergies  10.  Grade 2 immune mediated pneumonitis    PLAN:  1.  I did go over the cytology result with the patient and his wife and fortunately did confirm malignant cells.  2.  At this point I gave patient 2 options either best supportive care secondary progressive malignancy with borderline performance status versus second line treatment with Taxotere Cyramza.  The patient is interested in treatment however would like to wait 4 to 3 weeks before starting treatment which I think is reasonable.  3. We will continue to monitor the patient's labs throughout treatment including blood counts, kidney function, thyroid function, and liver functions.   4.  I did go over potential side effects of treatment including peripheral neuropathy infusion reaction pancytopenia, neutropenic fever with possible sepsis, risk of bleeding or clotting, and potential death.  5.  I will set the patient up to follow-up with me in 3 weeks with possible starting treatment that day Taxotere with Cyramza every 3 weeks.  6.  RP the patient scans prior to cycle #4.   7.  The patient will continue Mucinex 600 mg take by mouth twice per day as needed for congestion and cough. He is also using Symbicort and Incruse for COPD/Black lung disease.   8. He will continue levothyroxine 75 mcg for hypothyroidism. We will adjust his dose if needed for fluctuations in TSH.   9.  We will continue Zofran as needed for chemotherapy-induced nausea.  10.  We will continue folic acid, vitamin B12, and Decadron per Alimta protocol.  11. The patient will continue Miralax, Senakot, and Lactulose for constipation.   12.  He will continue saline eye drops for conjunctivitis. We will add Decadron eye drops to see if this helps with tearing related to Alimta.   13.  We will continue Magic Mouthwash to use as  needed for treatment induced sore throat.    14.  Will follow-up with pulmonary for interstitial lung disease.   15.  He will continue Flonase nasal spray for chronic sinusitis.   16.  The patient has completed his tapering schedule of prednisone.  17.  If his effusion recurrent will consider inserting a left Pleurx catheter.  Padmaja Denny MD  3/16/2021

## 2021-04-06 ENCOUNTER — APPOINTMENT (OUTPATIENT)
Dept: ONCOLOGY | Facility: HOSPITAL | Age: 66
End: 2021-04-06

## (undated) DEVICE — ST ACC MICROPUNCTURE .018 TRANSLSS/SS/TP 5F/10CM 21G/7CM

## (undated) DEVICE — ST EXT MICROBORE FIX M LL 38IN

## (undated) DEVICE — ST NDL BRONCH ASP VIZISHOT 2 FLX 19GA

## (undated) DEVICE — BOWL UTIL STRL 32OZ

## (undated) DEVICE — STPCK LP 1WY RA 200PSI

## (undated) DEVICE — DETACHMENT SYSTEM: Brand: INZONE

## (undated) DEVICE — LEX NEURO ANGIOGRAPHY: Brand: MEDLINE INDUSTRIES, INC.

## (undated) DEVICE — RADIFOCUS GLIDEWIRE: Brand: GLIDEWIRE

## (undated) DEVICE — TRAP,MUCUS SPECIMEN,40CC: Brand: MEDLINE

## (undated) DEVICE — CONN STD FOR/O2 TBG

## (undated) DEVICE — ADAPT SWVL FIBROPTIC BRONCH

## (undated) DEVICE — SYR 10ML

## (undated) DEVICE — ANGIO-SEAL VIP VASCULAR CLOSURE DEVICE: Brand: ANGIO-SEAL

## (undated) DEVICE — ROTATING HEMOSTATIC VALVE .096": Brand: RHV

## (undated) DEVICE — NEURO GUIDEWIRE WITH HYDROPHILIC COATING: Brand: SYNCHRO 14

## (undated) DEVICE — SINGLE USE SUCTION VALVE MAJ-209: Brand: SINGLE USE SUCTION VALVE (STERILE)

## (undated) DEVICE — INTRO SHEATH ART/FEM ENGAGE .038 5F12CM

## (undated) DEVICE — GUIDEWIRE WITH ICE™ HYDROPHILIC COATING: Brand: TRANSEND™ EX

## (undated) DEVICE — FRCP BX RADJAW4 PULM WO NDL STD1.8X2 100

## (undated) DEVICE — NEURO  GUIDEWIRE WITH HYDROPHILIC COATING: Brand: SYNCHRO 10

## (undated) DEVICE — 1 TIP PRE-SHAPED MICROCATHETER: Brand: EXCELSIOR XT-27

## (undated) DEVICE — 2 TIP PRE-SHAPED 45 MICROCATHETER: Brand: EXCELSIOR SL-10

## (undated) DEVICE — Device: Brand: BALLOON

## (undated) DEVICE — SINGLE USE BIOPSY VALVE MAJ-210: Brand: SINGLE USE BIOPSY VALVE (STERILE)

## (undated) DEVICE — LUER-LOK 360°: Brand: CONNECTA, LUER-LOK

## (undated) DEVICE — BALN SFT VU COBRA2 .035IN 5F 65CM